# Patient Record
Sex: MALE | Race: WHITE | NOT HISPANIC OR LATINO
[De-identification: names, ages, dates, MRNs, and addresses within clinical notes are randomized per-mention and may not be internally consistent; named-entity substitution may affect disease eponyms.]

---

## 2019-06-14 ENCOUNTER — APPOINTMENT (OUTPATIENT)
Dept: UROLOGY | Facility: CLINIC | Age: 69
End: 2019-06-14
Payer: MEDICARE

## 2019-06-14 VITALS
HEART RATE: 68 BPM | RESPIRATION RATE: 16 BRPM | BODY MASS INDEX: 40.09 KG/M2 | DIASTOLIC BLOOD PRESSURE: 74 MMHG | HEIGHT: 70 IN | TEMPERATURE: 97.8 F | WEIGHT: 280 LBS | SYSTOLIC BLOOD PRESSURE: 163 MMHG | OXYGEN SATURATION: 99 %

## 2019-06-14 PROCEDURE — 99203 OFFICE O/P NEW LOW 30 MIN: CPT

## 2019-06-14 NOTE — LETTER BODY
[FreeTextEntry1] : Hudson Simms MD\par [address] [facility]\[place] [address]\par \par Dear Dr. Simms,\par \par Marin Cade returns to the office today. He is a 68-year-old man who returns to the office for reevaluation of PSA elevation. I have seen him in the past for this and performed a prostate biopsy in February 2011. The biopsy at that time that showed all benign prostate tissue. The last PSA I had on record until his recent tests that he brought with him today was 6.78 ng/mL with 19% free fraction. He had an MRI at the time of 2013 which showed diffuse heterogeneity of the peripheral zone most consistent with inflammation, PIRAD 3 designation. The prostate size was measured at 45 cubic centimeters at that time.\par \par The patient says that he has had his PSA followed fairly regularly since I last saw him in 2013. He thinks most recently it had been around April last year but this year his PSA was checked about 4 weeks ago and was found to be 11.5 ng/mL. This prompted his referral back for followup evaluation.\par \par The patient reports no major urinary complaints at this time including urgency, frequency, gross hematuria or dysuria. He denies fevers or chills and has no nausea or vomiting. His appetite is stable. He denies unintentional weight loss. He remains overweight.\par \par We discussed the nonspecific nature of the PSA blood test in the multiple reasons why it might become elevated. I would recommend we first repeat his PSA level today to confirm the consistent elevation. I would also suggest that he undergo followup MRI study of the prostate to confirm if there are any suspicious lesions present that would warrant a targeted biopsy approach here. We may well for her with the biopsy regardless of the MRI findings but the MRI findings will help guide the approach for biopsy.\par \par We did briefly discuss the transperineal approach to biopsy that is now being used for prostate cancer detection and I will discuss that further with him after the MRI findings are available for review. I will update you with the results once available for review.\par \par Sincerely,\par \par \par \par \par Román Singh MD, FACS\par  of Urology\par [facility]\par \par [facility]\par Director of Robotics and Minimally Invasive Surgery\par [address] [place] [address]\[place] [address]\par P: [phone]\par F: [phone]\par [email or web address]\par

## 2019-06-14 NOTE — PHYSICAL EXAM
[General Appearance - Well Developed] : well developed [Well Groomed] : well groomed [General Appearance - Well Nourished] : well nourished [Normal Appearance] : normal appearance [Edema] : no peripheral edema [General Appearance - In No Acute Distress] : no acute distress [Respiration, Rhythm And Depth] : normal respiratory rhythm and effort [Abdomen Tenderness] : non-tender [Abdomen Soft] : soft [Exaggerated Use Of Accessory Muscles For Inspiration] : no accessory muscle use [Urinary Bladder Findings] : the bladder was normal on palpation [Urethral Meatus] : meatus normal [Costovertebral Angle Tenderness] : no ~M costovertebral angle tenderness [Testes Mass (___cm)] : there were no testicular masses [Scrotum] : the scrotum was normal [No Focal Deficits] : no focal deficits [] : no rash [Normal Station and Gait] : the gait and station were normal for the patient's age [Affect] : the affect was normal [Oriented To Time, Place, And Person] : oriented to person, place, and time [Mood] : the mood was normal [No Palpable Adenopathy] : no palpable adenopathy [Not Anxious] : not anxious

## 2019-06-19 ENCOUNTER — OTHER (OUTPATIENT)
Age: 69
End: 2019-06-19

## 2019-06-19 LAB
PSA FREE FLD-MCNC: 17 %
PSA FREE SERPL-MCNC: 1.93 NG/ML
PSA SERPL-MCNC: 11.7 NG/ML

## 2019-07-02 ENCOUNTER — FORM ENCOUNTER (OUTPATIENT)
Age: 69
End: 2019-07-02

## 2019-07-03 ENCOUNTER — APPOINTMENT (OUTPATIENT)
Dept: MRI IMAGING | Facility: CLINIC | Age: 69
End: 2019-07-03
Payer: MEDICARE

## 2019-07-03 ENCOUNTER — OUTPATIENT (OUTPATIENT)
Dept: OUTPATIENT SERVICES | Facility: HOSPITAL | Age: 69
LOS: 1 days | End: 2019-07-03
Payer: COMMERCIAL

## 2019-07-03 DIAGNOSIS — Z98.89 OTHER SPECIFIED POSTPROCEDURAL STATES: Chronic | ICD-10-CM

## 2019-07-03 DIAGNOSIS — R97.20 ELEVATED PROSTATE SPECIFIC ANTIGEN [PSA]: ICD-10-CM

## 2019-07-03 PROCEDURE — A9585: CPT

## 2019-07-03 PROCEDURE — 72197 MRI PELVIS W/O & W/DYE: CPT | Mod: 26

## 2019-07-03 PROCEDURE — 72197 MRI PELVIS W/O & W/DYE: CPT

## 2019-09-20 ENCOUNTER — APPOINTMENT (OUTPATIENT)
Dept: UROLOGY | Facility: CLINIC | Age: 69
End: 2019-09-20
Payer: MEDICARE

## 2019-09-20 VITALS — SYSTOLIC BLOOD PRESSURE: 175 MMHG | HEART RATE: 75 BPM | DIASTOLIC BLOOD PRESSURE: 80 MMHG

## 2019-09-20 PROCEDURE — 99213 OFFICE O/P EST LOW 20 MIN: CPT

## 2019-10-04 ENCOUNTER — APPOINTMENT (OUTPATIENT)
Dept: UROLOGY | Facility: CLINIC | Age: 69
End: 2019-10-04
Payer: MEDICARE

## 2019-10-04 VITALS — TEMPERATURE: 97.7 F | HEART RATE: 62 BPM | DIASTOLIC BLOOD PRESSURE: 70 MMHG | SYSTOLIC BLOOD PRESSURE: 172 MMHG

## 2019-10-04 VITALS — DIASTOLIC BLOOD PRESSURE: 76 MMHG | HEART RATE: 61 BPM | SYSTOLIC BLOOD PRESSURE: 152 MMHG

## 2019-10-04 VITALS — HEART RATE: 58 BPM | DIASTOLIC BLOOD PRESSURE: 68 MMHG | SYSTOLIC BLOOD PRESSURE: 98 MMHG

## 2019-10-04 PROCEDURE — 76942 ECHO GUIDE FOR BIOPSY: CPT | Mod: 26,59

## 2019-10-04 PROCEDURE — 76872 US TRANSRECTAL: CPT | Mod: 26

## 2019-10-04 PROCEDURE — 55700: CPT | Mod: 22

## 2019-10-14 LAB — CORE LAB BIOPSY: NORMAL

## 2019-10-14 RX ORDER — DIAZEPAM 5 MG/1
5 TABLET ORAL
Qty: 1 | Refills: 0 | Status: DISCONTINUED | COMMUNITY
Start: 2019-09-20 | End: 2019-10-14

## 2019-10-21 ENCOUNTER — RESULT REVIEW (OUTPATIENT)
Age: 69
End: 2019-10-21

## 2020-03-20 ENCOUNTER — APPOINTMENT (OUTPATIENT)
Dept: UROLOGY | Facility: CLINIC | Age: 70
End: 2020-03-20

## 2020-05-05 ENCOUNTER — APPOINTMENT (OUTPATIENT)
Dept: UROLOGY | Facility: CLINIC | Age: 70
End: 2020-05-05
Payer: MEDICARE

## 2020-05-05 VITALS
HEART RATE: 75 BPM | DIASTOLIC BLOOD PRESSURE: 84 MMHG | RESPIRATION RATE: 16 BRPM | TEMPERATURE: 98.1 F | SYSTOLIC BLOOD PRESSURE: 168 MMHG

## 2020-05-05 DIAGNOSIS — Z87.898 PERSONAL HISTORY OF OTHER SPECIFIED CONDITIONS: ICD-10-CM

## 2020-05-05 PROCEDURE — 99213 OFFICE O/P EST LOW 20 MIN: CPT

## 2020-05-09 ENCOUNTER — TRANSCRIPTION ENCOUNTER (OUTPATIENT)
Age: 70
End: 2020-05-09

## 2020-11-20 ENCOUNTER — TRANSCRIPTION ENCOUNTER (OUTPATIENT)
Age: 70
End: 2020-11-20

## 2020-11-20 ENCOUNTER — INPATIENT (INPATIENT)
Facility: HOSPITAL | Age: 70
LOS: 0 days | Discharge: HOME | DRG: 247 | End: 2020-11-21
Attending: INTERNAL MEDICINE | Admitting: INTERNAL MEDICINE
Payer: COMMERCIAL

## 2020-11-20 VITALS
SYSTOLIC BLOOD PRESSURE: 173 MMHG | HEART RATE: 72 BPM | HEIGHT: 70 IN | RESPIRATION RATE: 20 BRPM | TEMPERATURE: 98 F | DIASTOLIC BLOOD PRESSURE: 92 MMHG | WEIGHT: 259.93 LBS | OXYGEN SATURATION: 98 %

## 2020-11-20 DIAGNOSIS — Z98.89 OTHER SPECIFIED POSTPROCEDURAL STATES: Chronic | ICD-10-CM

## 2020-11-20 DIAGNOSIS — R00.2 PALPITATIONS: ICD-10-CM

## 2020-11-20 DIAGNOSIS — Z98.890 OTHER SPECIFIED POSTPROCEDURAL STATES: Chronic | ICD-10-CM

## 2020-11-20 DIAGNOSIS — I20.0 UNSTABLE ANGINA: ICD-10-CM

## 2020-11-20 LAB
ALBUMIN SERPL ELPH-MCNC: 4.4 G/DL — SIGNIFICANT CHANGE UP (ref 3.3–5.2)
ALP SERPL-CCNC: 77 U/L — SIGNIFICANT CHANGE UP (ref 40–120)
ALT FLD-CCNC: 20 U/L — SIGNIFICANT CHANGE UP
ANION GAP SERPL CALC-SCNC: 10 MMOL/L — SIGNIFICANT CHANGE UP (ref 5–17)
APTT BLD: 28.7 SEC — SIGNIFICANT CHANGE UP (ref 27.5–35.5)
AST SERPL-CCNC: 29 U/L — SIGNIFICANT CHANGE UP
BASOPHILS # BLD AUTO: 0.07 K/UL — SIGNIFICANT CHANGE UP (ref 0–0.2)
BASOPHILS NFR BLD AUTO: 1 % — SIGNIFICANT CHANGE UP (ref 0–2)
BILIRUB SERPL-MCNC: 0.7 MG/DL — SIGNIFICANT CHANGE UP (ref 0.4–2)
BUN SERPL-MCNC: 23 MG/DL — HIGH (ref 8–20)
CALCIUM SERPL-MCNC: 9.5 MG/DL — SIGNIFICANT CHANGE UP (ref 8.6–10.2)
CHLORIDE SERPL-SCNC: 102 MMOL/L — SIGNIFICANT CHANGE UP (ref 98–107)
CO2 SERPL-SCNC: 29 MMOL/L — SIGNIFICANT CHANGE UP (ref 22–29)
CREAT SERPL-MCNC: 1.09 MG/DL — SIGNIFICANT CHANGE UP (ref 0.5–1.3)
D DIMER BLD IA.RAPID-MCNC: 240 NG/ML DDU — HIGH
EOSINOPHIL # BLD AUTO: 0.28 K/UL — SIGNIFICANT CHANGE UP (ref 0–0.5)
EOSINOPHIL NFR BLD AUTO: 4.1 % — SIGNIFICANT CHANGE UP (ref 0–6)
GLUCOSE SERPL-MCNC: 100 MG/DL — HIGH (ref 70–99)
HCT VFR BLD CALC: 46 % — SIGNIFICANT CHANGE UP (ref 39–50)
HGB BLD-MCNC: 15.2 G/DL — SIGNIFICANT CHANGE UP (ref 13–17)
IMM GRANULOCYTES NFR BLD AUTO: 0.4 % — SIGNIFICANT CHANGE UP (ref 0–1.5)
INR BLD: 0.99 RATIO — SIGNIFICANT CHANGE UP (ref 0.88–1.16)
LYMPHOCYTES # BLD AUTO: 1.96 K/UL — SIGNIFICANT CHANGE UP (ref 1–3.3)
LYMPHOCYTES # BLD AUTO: 28.6 % — SIGNIFICANT CHANGE UP (ref 13–44)
MAGNESIUM SERPL-MCNC: 2.2 MG/DL — SIGNIFICANT CHANGE UP (ref 1.6–2.6)
MCHC RBC-ENTMCNC: 31.4 PG — SIGNIFICANT CHANGE UP (ref 27–34)
MCHC RBC-ENTMCNC: 33 GM/DL — SIGNIFICANT CHANGE UP (ref 32–36)
MCV RBC AUTO: 95 FL — SIGNIFICANT CHANGE UP (ref 80–100)
MONOCYTES # BLD AUTO: 0.69 K/UL — SIGNIFICANT CHANGE UP (ref 0–0.9)
MONOCYTES NFR BLD AUTO: 10.1 % — SIGNIFICANT CHANGE UP (ref 2–14)
NEUTROPHILS # BLD AUTO: 3.83 K/UL — SIGNIFICANT CHANGE UP (ref 1.8–7.4)
NEUTROPHILS NFR BLD AUTO: 55.8 % — SIGNIFICANT CHANGE UP (ref 43–77)
NT-PROBNP SERPL-SCNC: 773 PG/ML — HIGH (ref 0–300)
PLATELET # BLD AUTO: 215 K/UL — SIGNIFICANT CHANGE UP (ref 150–400)
POTASSIUM SERPL-MCNC: 4.5 MMOL/L — SIGNIFICANT CHANGE UP (ref 3.5–5.3)
POTASSIUM SERPL-SCNC: 4.5 MMOL/L — SIGNIFICANT CHANGE UP (ref 3.5–5.3)
PROT SERPL-MCNC: 7.7 G/DL — SIGNIFICANT CHANGE UP (ref 6.6–8.7)
PROTHROM AB SERPL-ACNC: 11.5 SEC — SIGNIFICANT CHANGE UP (ref 10.6–13.6)
RBC # BLD: 4.84 M/UL — SIGNIFICANT CHANGE UP (ref 4.2–5.8)
RBC # FLD: 13.4 % — SIGNIFICANT CHANGE UP (ref 10.3–14.5)
SARS-COV-2 RNA SPEC QL NAA+PROBE: SIGNIFICANT CHANGE UP
SODIUM SERPL-SCNC: 141 MMOL/L — SIGNIFICANT CHANGE UP (ref 135–145)
TROPONIN T SERPL-MCNC: 0.03 NG/ML — SIGNIFICANT CHANGE UP (ref 0–0.06)
TSH SERPL-MCNC: 1.52 UIU/ML — SIGNIFICANT CHANGE UP (ref 0.27–4.2)
WBC # BLD: 6.86 K/UL — SIGNIFICANT CHANGE UP (ref 3.8–10.5)
WBC # FLD AUTO: 6.86 K/UL — SIGNIFICANT CHANGE UP (ref 3.8–10.5)

## 2020-11-20 PROCEDURE — 71275 CT ANGIOGRAPHY CHEST: CPT | Mod: 26

## 2020-11-20 PROCEDURE — 93458 L HRT ARTERY/VENTRICLE ANGIO: CPT | Mod: 26,XU

## 2020-11-20 PROCEDURE — 71045 X-RAY EXAM CHEST 1 VIEW: CPT | Mod: 26

## 2020-11-20 PROCEDURE — 99285 EMERGENCY DEPT VISIT HI MDM: CPT

## 2020-11-20 PROCEDURE — 99152 MOD SED SAME PHYS/QHP 5/>YRS: CPT

## 2020-11-20 PROCEDURE — 93010 ELECTROCARDIOGRAM REPORT: CPT | Mod: 76

## 2020-11-20 PROCEDURE — 92928 PRQ TCAT PLMT NTRAC ST 1 LES: CPT | Mod: RC

## 2020-11-20 RX ORDER — ALPRAZOLAM 0.25 MG
0.25 TABLET ORAL AT BEDTIME
Refills: 0 | Status: DISCONTINUED | OUTPATIENT
Start: 2020-11-20 | End: 2020-11-21

## 2020-11-20 RX ORDER — AMLODIPINE BESYLATE 2.5 MG/1
5 TABLET ORAL DAILY
Refills: 0 | Status: DISCONTINUED | OUTPATIENT
Start: 2020-11-21 | End: 2020-11-21

## 2020-11-20 RX ORDER — SODIUM CHLORIDE 9 MG/ML
500 INJECTION INTRAMUSCULAR; INTRAVENOUS; SUBCUTANEOUS ONCE
Refills: 0 | Status: COMPLETED | OUTPATIENT
Start: 2020-11-20 | End: 2020-11-20

## 2020-11-20 RX ORDER — ASPIRIN/CALCIUM CARB/MAGNESIUM 324 MG
81 TABLET ORAL DAILY
Refills: 0 | Status: DISCONTINUED | OUTPATIENT
Start: 2020-11-21 | End: 2020-11-21

## 2020-11-20 RX ORDER — ACETAMINOPHEN 500 MG
650 TABLET ORAL EVERY 6 HOURS
Refills: 0 | Status: DISCONTINUED | OUTPATIENT
Start: 2020-11-20 | End: 2020-11-21

## 2020-11-20 RX ORDER — ONDANSETRON 8 MG/1
4 TABLET, FILM COATED ORAL EVERY 6 HOURS
Refills: 0 | Status: DISCONTINUED | OUTPATIENT
Start: 2020-11-20 | End: 2020-11-21

## 2020-11-20 RX ORDER — METOPROLOL TARTRATE 50 MG
50 TABLET ORAL ONCE
Refills: 0 | Status: COMPLETED | OUTPATIENT
Start: 2020-11-20 | End: 2020-11-20

## 2020-11-20 RX ORDER — ATORVASTATIN CALCIUM 80 MG/1
80 TABLET, FILM COATED ORAL AT BEDTIME
Refills: 0 | Status: DISCONTINUED | OUTPATIENT
Start: 2020-11-20 | End: 2020-11-21

## 2020-11-20 RX ORDER — ASPIRIN/CALCIUM CARB/MAGNESIUM 324 MG
324 TABLET ORAL ONCE
Refills: 0 | Status: COMPLETED | OUTPATIENT
Start: 2020-11-20 | End: 2020-11-20

## 2020-11-20 RX ORDER — METOPROLOL TARTRATE 50 MG
25 TABLET ORAL DAILY
Refills: 0 | Status: DISCONTINUED | OUTPATIENT
Start: 2020-11-21 | End: 2020-11-21

## 2020-11-20 RX ORDER — CLOPIDOGREL BISULFATE 75 MG/1
600 TABLET, FILM COATED ORAL ONCE
Refills: 0 | Status: COMPLETED | OUTPATIENT
Start: 2020-11-21 | End: 2020-11-21

## 2020-11-20 RX ORDER — OXYCODONE HYDROCHLORIDE 5 MG/1
5 TABLET ORAL EVERY 6 HOURS
Refills: 0 | Status: DISCONTINUED | OUTPATIENT
Start: 2020-11-20 | End: 2020-11-21

## 2020-11-20 RX ADMIN — Medication 50 MILLIGRAM(S): at 14:12

## 2020-11-20 RX ADMIN — Medication 324 MILLIGRAM(S): at 14:12

## 2020-11-20 RX ADMIN — Medication 650 MILLIGRAM(S): at 21:49

## 2020-11-20 RX ADMIN — ATORVASTATIN CALCIUM 80 MILLIGRAM(S): 80 TABLET, FILM COATED ORAL at 21:49

## 2020-11-20 RX ADMIN — SODIUM CHLORIDE 500 MILLILITER(S): 9 INJECTION INTRAMUSCULAR; INTRAVENOUS; SUBCUTANEOUS at 21:48

## 2020-11-20 RX ADMIN — Medication 650 MILLIGRAM(S): at 22:30

## 2020-11-20 NOTE — H&P PST ADULT - HISTORY OF PRESENT ILLNESS
Narrative: 71 y/o obese M, former smoker (.5PPD x10 years; quit 40 years ago), with a PMHx of HTN, HLD, BPH,  family hx of heart disease (father: MI x2 at age 70; PPM) presented to the ED with c/o palpitations.  He reports he recently followed up with Dr. Salas and had a Holter Monitor placed (Tues 11/17) for his new onset palpitations. Dr. Souza called pt this am and advised him to come in through the ED.  His troponins have been negative, CT chest -, D-Dimer negative.  He reports he is active and bike rides as well playing golf; he has noticed his palpitations have increased in frequency and have woken him out of his sleep; they resolve spontaneously.  He formerly used to follow with Dr. Bauer ([facility]) and admits to having a "normal stress and echo back then" which was about 5 years ago. He has never had a cardiac catheterization.   In the ED, he was given aspirin 324mg and metoprolol 50mg.  Currently he is free from chest pain, SOB, palps, dyspnea, diaphoresis, jaw/back/shoulder pain; denies pre syncope, syncope, vision changes.  There is +2 edema noted bilaterally to his lower extremities.     Home meds: asa, amlodipine, zocor    ASA 2  MALL 2  BRA 1.2%

## 2020-11-20 NOTE — CONSULT NOTE ADULT - ATTENDING COMMENTS
I have personally seen, examined, and participated in the care of this patient. I have reviewed all pertinent clinical information, including history, physical exam, plan, and the PA/NP's note and agree except as noted below.    70 year old male with HTN & HL who presented with palpitations and exertional chest pain. He had a cardiac work up at [facility] ~5 years ago which he states was normal (TTE, Carotids, Nuclear Stress Test). He wore a Holter with Dr. Salas which showed NSVT and pAT. He had ECG changes so was sent to ER for evaluation.    Recommend obtaining TSH, TTE, and ischemic evaluation. Also, recommend starting Metoprolol 25mg BID and uptitrate as tolerated.    Outpatient follow up for NSVT/pAT.    Thank you for this consult.    Ahsan Mccann MD  Clinical Cardiac Electrophysiology

## 2020-11-20 NOTE — H&P PST ADULT - NSICDXPASTMEDICALHX_GEN_ALL_CORE_FT
PAST MEDICAL HISTORY:  BPH (benign prostatic hyperplasia)     Former cigarette smoker     HTN (hypertension)     Hypertension     NSVT (nonsustained ventricular tachycardia)     Obesity     Wrist pain, right

## 2020-11-20 NOTE — DISCHARGE NOTE PROVIDER - NSDCCPCAREPLAN_GEN_ALL_CORE_FT
PRINCIPAL DISCHARGE DIAGNOSIS  Diagnosis: Unstable angina  Assessment and Plan of Treatment: -you had one stent placed during your cardiac catheterization with Dr. Reno  -right coronary artery stent; resolute ana 3.0x18mm  -dual anti platelet therapy with aspirin and plavix for stent protection; do not discontinue these medications without discussing with your Cardiologist first  -continue your amlodipine and simvastatin  -metoprolol succinate 25mg daily for your heart rate and blood pressure  -diet and exercise modifications  -follow up with Dr. Salas at [facility]

## 2020-11-20 NOTE — H&P PST ADULT - NSICDXPASTSURGICALHX_GEN_ALL_CORE_FT
PAST SURGICAL HISTORY:  H/O prostate biopsy     S/P colonoscopy     S/P hernia repair 1980    S/P knee surgery righty scope in 1990 and left meniscus tear in 1977

## 2020-11-20 NOTE — H&P PST ADULT - NEGATIVE NEUROLOGICAL SYMPTOMS
no transient paralysis/no weakness/no loss of sensation/no difficulty walking/no syncope/no tremors/no paresthesias/no headache/no generalized seizures

## 2020-11-20 NOTE — H&P PST ADULT - NEUROLOGICAL DETAILS
alert and oriented x 3/deep reflexes intact/responds to pain/cranial nerves intact/responds to verbal commands/sensation intact

## 2020-11-20 NOTE — CONSULT NOTE ADULT - SUBJECTIVE AND OBJECTIVE BOX
[facility]                                                               Office:  [address] [address]                                                              Telephone: [phone]. Fax:[phone]                                                                        CARDIOLOGY CONSULTATION NOTE                                                                                             Consult requested by:    Reason for Consultation:   History obtained by: Patient and medical record   obtained: No    Chief complaint:    Patient is a 70y old  Male who presents with a chief complaint of chest pain, palpitations, SOB       HPI: Pt 71 y/o with medical history of HTN, Right wrist tendonitis,           REVIEW OF SYMPTOMS:     CONSTITUTIONAL: No fever, weight loss, or fatigue  ENMT:  No difficulty hearing, tinnitus, vertigo; No sinus or throat pain  NECK: No pain or stiffness  CARDIOVASCULAR: See HPI  RESPIRATORY: No Dyspnea on exertion, Shortness of breath, cough, wheezing  : No dysuria, no hematuria   GI: No dark color stool, no melena, no diarrhea, no constipation, no abdominal pain   NEURO: No headache, no dizziness, no slurred speech   MUSCULOSKELETAL: No joint pain or swelling; No muscle, back, or extremity pain  PSYCH: No agitation, no anxiety.    ALL OTHER REVIEW OF SYSTEMS ARE NEGATIVE.      PREVIOUS DIAGNOSTIC TESTING  ECHO FINDINGS: approx 5 years ago, normal as per pt      STRESS FINDINGS: approx 5 years ago, normal as per pt        ALLERGIES: Allergies    No Known Allergies    Intolerances      PAST MEDICAL HISTORY  Wrist pain, right    Hypertension        PAST SURGICAL HISTORY  S/P colonoscopy    S/P knee surgery    S/P hernia repair        FAMILY HISTORY:  Family history of cancer (Mother)  uterus    Family history of heart disease (Father)    Family history of COPD (chronic obstructive pulmonary disease) (Father)        SOCIAL HISTORY:    CIGARETTES:  1/2 ppd, quit 40 years ago  ALCOHOL: Occasional  DRUGS: Denies      CURRENT MEDICATIONS:  metoprolol tartrate 50 milliGRAM(s) Oral Once     aspirin  chewable        HOME MEDICATIONS:    aspirin 81 mg oral tablet: 1 tab(s) orally once a day (22 May 2015 10:14)  Norvasc 5 mg oral tablet: 1 tab(s) orally once a day (22 May 2015 10:14)      Vital Signs Last 24 Hrs  T(C): 36.4 (20 Nov 2020 12:10), Max: 36.4 (20 Nov 2020 12:10)  T(F): 97.6 (20 Nov 2020 12:10), Max: 97.6 (20 Nov 2020 12:10)  HR: 72 (20 Nov 2020 12:10) (72 - 72)  BP: 173/92 (20 Nov 2020 12:10) (173/92 - 173/92)  RR: 20 (20 Nov 2020 12:10) (20 - 20)  SpO2: 98% (20 Nov 2020 12:10) (98% - 98%)      PHYSICAL EXAM:  Constitutional: Comfortable . No acute distress.   HEENT: Atraumatic and normocephalic , neck is supple . no JVD. No carotid bruit. PEERL   CNS: A&Ox3. No focal deficits. EOMI   Lymph Nodes: Cervical : Not palpable  Respiratory: CTAB    Cardiovascular: S1S2 RRR. No murmur/rubs or gallop  Gastrointestinal: Soft non-tender and non distended . +Bowel sounds, negative Vasquez's sign.  Extremities: Bilateral lower extremity pitting +3/+3 edema   Psychiatric: Calm . no agitation  Skin: No skin rash/ulcers visualized to face, hands or feet    Intake and output:     LABS:                        15.2   6.86  )-----------( 215      ( 20 Nov 2020 13:10 )             46.0             ;p-BNP=  PT/INR - ( 20 Nov 2020 13:10 )   PT: 11.5 sec;   INR: 0.99 ratio         PTT - ( 20 Nov 2020 13:10 )  PTT:28.7 sec      INTERPRETATION OF TELEMETRY:   ECG: NSR HR @ 69, inferolateral leads ST-T-wave abnormalties     RADIOLOGY & ADDITIONAL STUDIES:    X-ray:          [facility]                                                               Office:  [address] [address]                                                              Telephone: [phone]. Fax:[phone]                                                                        CARDIOLOGY CONSULTATION NOTE                                                                                             Consult requested by:  Dr. Mendoza  Reason for Consultation: Chest Pain, palpitations, SOB  History obtained by: Patient and medical record   obtained: No    Chief complaint:    Patient is a 70y old  Male who presents with a chief complaint of chest pain, palpitations, SOB       HPI: Pt 69 y/o with medical history of HTN, Right wrist tendonitis,  who presents to [facility]ED as referred by Maritza Michael in outpt cardiology office for palpitations, chest pain, SOB. Pt states that for past two weeks he has been experiencing palpitations intermittently lasting for "a few minutes". Pt denies symptoms of dizziness or episodes of syncope. Pt also c/o of SOB/NO during/after exercise (avg. exercise biking 3 days/ week, walking). Pt also admits to chest pain described as dull aching, left sided, non-radiating. Pt was referred to Dr. Salas from PCP d/t c/o of palpitations and NSVT/SVT found on holter monitoring. During cardiology appointment today pt was assessed to have above stated symptoms with worsening bilateral lower extremity edema. Pt was advised to seek emergent medical attention. In ED, ECG- NSR HR @ 69, inferolateral leads ST-T-wave abnormalties, Trop#1-negative, Xray-pending.           REVIEW OF SYMPTOMS:     CONSTITUTIONAL: No fever, weight loss, or fatigue  ENMT:  No difficulty hearing, tinnitus, vertigo; No sinus or throat pain  NECK: No pain or stiffness  CARDIOVASCULAR: See HPI  RESPIRATORY: No Dyspnea on exertion, Shortness of breath, cough, wheezing  : No dysuria, no hematuria   GI: No dark color stool, no melena, no diarrhea, no constipation, no abdominal pain   NEURO: No headache, no dizziness, no slurred speech   MUSCULOSKELETAL: No joint pain or swelling; No muscle, back, or extremity pain  PSYCH: No agitation, no anxiety.    ALL OTHER REVIEW OF SYSTEMS ARE NEGATIVE.      PREVIOUS DIAGNOSTIC TESTING  ECHO FINDINGS: approx 5 years ago, normal as per pt      STRESS FINDINGS: approx 5 years ago, normal as per pt        ALLERGIES: Allergies    No Known Allergies    Intolerances      PAST MEDICAL HISTORY  Wrist pain, right    Hypertension        PAST SURGICAL HISTORY  S/P colonoscopy    S/P knee surgery    S/P hernia repair        FAMILY HISTORY:  Family history of cancer (Mother)  uterus    Family history of heart disease (Father)    Family history of COPD (chronic obstructive pulmonary disease) (Father)        SOCIAL HISTORY:    CIGARETTES:  1/2 ppd, quit 40 years ago  ALCOHOL: Occasional  DRUGS: Denies      CURRENT MEDICATIONS:  metoprolol tartrate 50 milliGRAM(s) Oral Once     aspirin  chewable        HOME MEDICATIONS:    aspirin 81 mg oral tablet: 1 tab(s) orally once a day (22 May 2015 10:14)  Norvasc 5 mg oral tablet: 1 tab(s) orally once a day (22 May 2015 10:14)      Vital Signs Last 24 Hrs  T(C): 36.4 (20 Nov 2020 12:10), Max: 36.4 (20 Nov 2020 12:10)  T(F): 97.6 (20 Nov 2020 12:10), Max: 97.6 (20 Nov 2020 12:10)  HR: 72 (20 Nov 2020 12:10) (72 - 72)  BP: 173/92 (20 Nov 2020 12:10) (173/92 - 173/92)  RR: 20 (20 Nov 2020 12:10) (20 - 20)  SpO2: 98% (20 Nov 2020 12:10) (98% - 98%)      PHYSICAL EXAM:  Constitutional: Comfortable . No acute distress.   HEENT: Atraumatic and normocephalic , neck is supple . no JVD. No carotid bruit. PEERL   CNS: A&Ox3. No focal deficits. EOMI   Lymph Nodes: Cervical : Not palpable  Respiratory: CTAB    Cardiovascular: S1S2 RRR. No murmur/rubs or gallop  Gastrointestinal: Soft non-tender and non distended . +Bowel sounds, negative Vasquez's sign.  Extremities: Bilateral lower extremity pitting +3/+3 edema   Psychiatric: Calm . no agitation  Skin: No skin rash/ulcers visualized to face, hands or feet    Intake and output:     LABS:                        15.2   6.86  )-----------( 215      ( 20 Nov 2020 13:10 )             46.0             ;p-BNP=  PT/INR - ( 20 Nov 2020 13:10 )   PT: 11.5 sec;   INR: 0.99 ratio         PTT - ( 20 Nov 2020 13:10 )  PTT:28.7 sec      INTERPRETATION OF TELEMETRY: NSR HR @ 60s     ECG: NSR HR @ 69, inferolateral leads ST-T-wave abnormalties     RADIOLOGY & ADDITIONAL STUDIES:    X-ray:  pending

## 2020-11-20 NOTE — DISCHARGE NOTE PROVIDER - NSDCMRMEDTOKEN_GEN_ALL_CORE_FT
aspirin 81 mg oral tablet: 1 tab(s) orally once a day  Norvasc 5 mg oral tablet: 1 tab(s) orally once a day  Zocor 20 mg oral tablet: 1 tab(s) orally once a day (at bedtime)   aspirin 81 mg oral tablet: 1 tab(s) orally once a day  clopidogrel 75 mg oral tablet: 1 tab(s) orally once a day  metoprolol succinate 25 mg oral tablet, extended release: 1 tab(s) orally once a day  Norvasc 5 mg oral tablet: 1 tab(s) orally once a day  zocor: 80 milligram(s) orally once a day (at bedtime)

## 2020-11-20 NOTE — CONSULT NOTE ADULT - ASSESSMENT
Pt 69 y/o with medical history of HTN, Right wrist tendonitis,  who presents to [facility]ED as referred by Maritza Michael in outpt cardiology office for palpitations, chest pain, SOB. Pt states that for past two weeks he has been experiencing palpitations intermittently lasting for "a few minutes". Pt denies symptoms of dizziness or episodes of syncope. Pt also c/o of SOB/NO during/after exercise (avg. exercise biking 3 days/ week, walking). Pt also admits to chest pain described as dull aching, left sided, non-radiating. Pt was referred to Dr. Salas from PCP d/t c/o of palpitations and NSVT/SVT found on holter monitoring. During cardiology appointment today pt was assessed to have above stated symptoms with worsening bilateral lower extremity edema. Pt was advised to seek emergent medical attention. In ED, ECG- NSR HR @ 69, inferolateral leads ST-T-wave abnormalties, Trop#1-negative, Xray-pending.         Chest Pain  -Trop#1-negative  - ECG- NSR HR @ 69, inferolateral leads ST-T-wave abnormalties  -Xray-pending  -Cont. to trend trop   -Give Lopressor 50MG prior to CCTA  -CCTA- ordered and pending   -Echo-ordered and pending    Ventricular Ectopy  -TSH- ordered and pending  -Cont tele monitoring  -EP eval requested     Dyspnea on Exertion  -Echo-ordered and pending  -D-dimer- 240  -Pt presents with bilateral lower extremity edema-chronic  -Awaiting echo to assess fluid status and treat accordingly       HTN  -Recent BP- 173/92  -Repeat and trend BP  -Consider adding BB therapy if coincides with EP recs, if not start thiazide diuretics

## 2020-11-20 NOTE — ED PROVIDER NOTE - NS ED ROS FT
Constitutional: (-) fever  (-)chills  (-)sweats  Eyes/ENT: (-)   Cardiovascular: (+) chest pain, (+) palpitations (-) edema   Respiratory: (-) cough, (+) shortness of breath   Gastrointestinal: (-)nausea  (-)vomiting, (-) diarrhea  (-) abdominal pain   :  (-)dysuria, (-)frequency, (-)urgency, (-)hematuria  Musculoskeletal: (-) neck pain, (-) back pain, (-) joint pain  Integumentary: (-) rash, (-) edema  Neurological: (-) headache, (-) altered mental status  (-)LOC

## 2020-11-20 NOTE — H&P PST ADULT - REASON FOR ADMISSION
Palpitations/ chest pain Palpitations/ chest pain  ACS <24 hours, unstable angina  urgent left heart cardiac catheterization

## 2020-11-20 NOTE — H&P PST ADULT - ASSESSMENT
71 y/o obese  M, former smoker, with HTN HLD new onset palpitations, now with ACS; here for left heart cardiac catheterization with Dr. Reno.

## 2020-11-20 NOTE — ED PROVIDER NOTE - CLINICAL SUMMARY MEDICAL DECISION MAKING FREE TEXT BOX
patient arrived with worsening exertional chest pain and rose, pleuritic. cta with no acute pe. trop negative. elevated bnp. cardiology requesting admission for cath.

## 2020-11-20 NOTE — DISCHARGE NOTE PROVIDER - NSDCACTIVITY_GEN_ALL_CORE
Choose lean meats and poultry without skin and prepare them without added saturated and trans fat.  Eat fish at least twice a week. Recent research shows that eating oily fish containing omega-3 fatty acids (for example, salmon, trout and herring) may help lower your risk of death from coronary artery disease.  Select fat-free, 1 percent fat and low-fat dairy products.  Cut back on foods containing partially hydrogenated vegetable oils to reduce trans fat in your diet.   To lower cholesterol, reduce saturated fat to no more than 5 to 6 percent of total calories. For someone eating 2,000 calories a day, that’s about 13 grams of saturated fat.  Cut back on beverages and foods with added sugars.  Choose and prepare foods with little or no salt. To lower blood pressure, aim to eat no more than 2,400 milligrams of sodium per day. Reducing daily intake to 1,500 mg is desirable because it can lower blood pressure even further.  If you drink alcohol, drink in moderation. That means one drink per day if you’re a woman and two drinks  per day if you’re a man.  Follow the American Heart Association recommendations when you eat out, and keep an eye on your portion sizes. Showering allowed/Do not make important decisions/Walking - Indoors allowed/No heavy lifting/straining/Choose lean meats and poultry without skin and prepare them without added saturated and trans fat.  Eat fish at least twice a week. Recent research shows that eating oily fish containing omega-3 fatty acids (for example, salmon, trout and herring) may help lower your risk of death from coronary artery disease.  Select fat-free, 1 percent fat and low-fat dairy products.  Cut back on foods containing partially hydrogenated vegetable oils to reduce trans fat in your diet.   To lower cholesterol, reduce saturated fat to no more than 5 to 6 percent of total calories. For someone eating 2,000 calories a day, that’s about 13 grams of saturated fat.  Cut back on beverages and foods with added sugars.  Choose and prepare foods with little or no salt. To lower blood pressure, aim to eat no more than 2,400 milligrams of sodium per day. Reducing daily intake to 1,500 mg is desirable because it can lower blood pressure even further.  If you drink alcohol, drink in moderation. That means one drink per day if you’re a woman and two drinks  per day if you’re a man.  Follow the American Heart Association recommendations when you eat out, and keep an eye on your portion sizes./Do not drive or operate machinery

## 2020-11-20 NOTE — ED PROVIDER NOTE - OBJECTIVE STATEMENT
70yoM; with pmh signif for HTN, ex-smoker 30 years ago; now p/w chest pain--sscp, pressure, associated with palpitations, and sob. patient with increasing rose over past week. denies f/c/s. denies cough. denies sick contacts. denies travel or recent surgery.  denies abd or back pain. denies contact with covid patients. reports he was wearing holter overnight and was found to have SVT, sent in for further eval.   PMH: HTN  SOCIAL: ex-smoker

## 2020-11-20 NOTE — DISCHARGE NOTE PROVIDER - CARE PROVIDER_API CALL
Butch Salas  CARDIOVASCULAR DISEASE  [address] [facility], [place] [address]  Phone: [phone]  Fax: [phone]  Follow Up Time:    Butch Salas  CARDIOVASCULAR DISEASE  [address] [facility], [place] [address]  Phone: [phone]  Fax: [phone]  Follow Up Time: 1 week

## 2020-11-20 NOTE — PROGRESS NOTE ADULT - SUBJECTIVE AND OBJECTIVE BOX
Department of Cardiology                                                                  [facility]/[place] [address] [address] [facility]                                                            Telephone: [phone]. Fax:[phone]                                                    Post- Procedure Note: Left Heart Cardiac Catheterization       Narrative:  70y  Male is now s/p left heart catheterization via right radial approach (no site complications; radial band in place) with Dr. Reno:  Right radial precautions  mRCA 99% stenosis  Resolute ana DANO placed 3.0 x18mm  normal LV gram  Antiplatelet used: brilinta loaded 180mg  Heparin used: 10,000 units  IVF used: normal saline 150ml  Contrast used: omnipaque 94ml  ACT over 400 at 1827         PAST MEDICAL & SURGICAL HISTORY:  BPH (benign prostatic hyperplasia)    Former cigarette smoker    NSVT (nonsustained ventricular tachycardia)    HTN (hypertension)    Obesity    Wrist pain, right    Hypertension    H/O prostate biopsy    S/P colonoscopy    S/P knee surgery  righty scope in 1990 and left meniscus tear in 1977    S/P hernia repair  1980      FAMILY HISTORY:  Family history of cancer (Mother)  uterus    Family history of heart disease (Father)    Family history of COPD (chronic obstructive pulmonary disease) (Father)      Home Medications:  aspirin 81 mg oral tablet: 1 tab(s) orally once a day (22 May 2015 10:14)  Norvasc 5 mg oral tablet: 1 tab(s) orally once a day (22 May 2015 10:14)    Patient is a 70y old  Male who presents with a chief complaint of Palpitations/ chest pain  ACS <24 hours, unstable angina  urgent left heart cardiac catheterization (20 Nov 2020 17:55)    HEALTH ISSUES - PROBLEM Dx:  Palpitations    HPI:  Narrative: 69 y/o obese M, former smoker (.5PPD x10 years; quit 40 years ago), with a PMHx of HTN, HLD, BPH,  family hx of heart disease (father: MI x2 at age 70; PPM) presented to the ED with c/o palpitations.  He reports he recently followed up with Dr. Salas and had a Holter Monitor placed (Tues 11/17) for his new onset palpitations. Dr. Souza called pt this am and advised him to come in through the ED.  His troponins have been negative, CT chest -, D-Dimer negative.  He reports he is active and bike rides as well playing golf; he has noticed his palpitations have increased in frequency and have woken him out of his sleep; they resolve spontaneously.  He formerly used to follow with Dr. Bauer ([facility]) and admits to having a "normal stress and echo back then" which was about 5 years ago. He has never had a cardiac catheterization.   In the ED, he was given aspirin 324mg and metoprolol 50mg.  Currently he is free from chest pain, SOB, palps, dyspnea, diaphoresis, jaw/back/shoulder pain; denies pre syncope, syncope, vision changes.  There is +2 edema noted bilaterally to his lower extremities.     Home meds: asa, amlodipine, zocor    ASA 2  MALL 2  BRA 1.2% (20 Nov 2020 17:55)    General: No fatigue, no fevers/chills  Respiratory: No dyspnea, no cough, no wheeze  CV: No chest pain, no palpitations  Abd: No nausea  Neuro: No headache, no dizziness    Objective:  Vital Signs Last 24 Hrs  T(C): 36.4 (20 Nov 2020 12:10), Max: 36.4 (20 Nov 2020 12:10)  T(F): 97.6 (20 Nov 2020 12:10), Max: 97.6 (20 Nov 2020 12:10)  HR: 58 (20 Nov 2020 17:40) (51 - 72)  BP: 166/81 (20 Nov 2020 17:40) (156/74 - 173/92)  BP(mean): --  RR: 16 (20 Nov 2020 17:40) (16 - 20)  SpO2: 97% (20 Nov 2020 17:40) (97% - 100%)    CM: SR  Neuro: A&OX3, CN 2-12 intact  HEENT: NC, AT  Lungs: CTA B/L  CV: S1, S2, no murmur, RRR  Abd: Soft  Right Radial cath site: Soft, no bleeding, no hematoma  Extremity: + distal pulses                          15.2   6.86  )-----------( 215      ( 20 Nov 2020 13:10 )             46.0     11-20    141  |  102  |  23.0<H>  ----------------------------<  100<H>  4.5   |  29.0  |  1.09    Ca    9.5      20 Nov 2020 13:10  Mg     2.2     11-20    TPro  7.7  /  Alb  4.4  /  TBili  0.7  /  DBili  x   /  AST  29  /  ALT  20  /  AlkPhos  77  11-20  PT/INR - ( 20 Nov 2020 13:10 )   PT: 11.5 sec;   INR: 0.99 ratio    PTT - ( 20 Nov 2020 13:10 )  PTT:28.7 sec    70y  Male is now s/p left heart catheterization via right radial approach (no site complications; radial band in place) with Dr. Reno    -ADMIT due to: unstable angina/ACS <24 hours, mRCA lesion   -post cardiac cath orders  -right radial precautions  -bedrest x 1 hours post procedure  -EKG post cath  -labs and EKG in am  -Dual anti platelet therapy with aspirin/ plavix  -plavix 600mg 11/21 in am then change to plavix 75mg po daily thereafter  -statin therapy; atorvastatin 80mg while in patient; resume zocor on discharge  -beta blocker; metoprolol succinate 25mg po daily  amlodipine 5mg po daily  -follow up outpt in 2 weeks with Cardiologist, Dr. Salas   -[facility] following during hospitalization  -cardiac rehab info provided/referral and communication to cardiac rehab completed  -Discharge in am if overnight tele, EKG, labs all remain WNL

## 2020-11-20 NOTE — H&P PST ADULT - NSICDXFAMILYHX_GEN_ALL_CORE_FT
FAMILY HISTORY:  Father  Still living? No  Family history of COPD (chronic obstructive pulmonary disease), Age at diagnosis: Age Unknown  Family history of heart disease, Age at diagnosis: Age Unknown    Mother  Still living? No  Family history of cancer, Age at diagnosis: Age Unknown

## 2020-11-20 NOTE — ED ADULT NURSE REASSESSMENT NOTE - NS ED NURSE REASSESS COMMENT FT1
Report given to RN in cath lab. Pt POC explained and verbalized understanding. Pt awaiting transport.

## 2020-11-20 NOTE — CHART NOTE - NSCHARTNOTEFT_GEN_A_CORE
Called by bedside nurse co pt BP 90/41 and HR 39. Pt asymptomatic   Pt s/p left heart catheterization via right radial approach. stent x 1 mRCA 99% stenosis                                                                REVIEW OF SYMPTOMS: Asymptomatic   Cardiovascular:  No chest pain,  No dyspnea,  No fatigue, No syncope,  No palpitations, No dizziness, No Orthopnea, No Paroxsymal nocturnal dyspnea.  Respiratory:  No Dyspnea, No cough.  Genitourinary:  No dysuria, no hematuria.  Gastrointestinal:  No nausea, no vomiting. No diarrhea.  No abdominal pain.   Neurological: No headache, no dizziness, no slurred speech.  Psychiatric: No agitation, no anxiety.    ALL OTHER REVIEW OF SYSTEMS ARE NEGATIVE.    Vital Signs Last 24 Hrs  T(C): 36.9 (20 Nov 2020 21:45), Max: 36.9 (20 Nov 2020 21:45)  T(F): 98.5 (20 Nov 2020 21:45), Max: 98.5 (20 Nov 2020 21:45)  HR: 50 (20 Nov 2020 21:45) (39 - 72)  BP: 113/59 (20 Nov 2020 21:45) (90/49 - 173/92)  RR: 15 (20 Nov 2020 21:45) (15 - 20)  SpO2: 95% (20 Nov 2020 21:45) (95% - 100%)    PHYSICAL EXAM:  Constitutional: Comfortable . No acute distress.   HEENT: Atraumatic and normocephalic , neck is supple . no JVD.  PEERL   CNS: A&O x3. No focal neurologic deficits.   Respiratory: CTAB. No wheezing, rhonchi or crackles   Cardiovascular:S1S2 RRR. No murmur or rubs  R wrist: + mild pain. + pulses. warm. Dressing noted dry, clean and intact. No hematoma, edema, or bleeding.  Gastrointestinal: Soft non-tender and non distended . +Bowel sounds.   Extremities: + 1 edema b/l (baseline as per pt)   Psychiatric: Calm . no agitation.    A&P    Hypotension / bradycardic. Resolved  Pt  placed in Trendelenburg position and 500 ml NS bolus was given  After treatment pt /59 and HR 50    Mild pain R wrist  Tylenol PRN    Pt is hemodynamically stable. Continue monitor. Nurse aware

## 2020-11-20 NOTE — ED ADULT NURSE NOTE - OBJECTIVE STATEMENT
Pt presents with c/o intermittent L pectoral CP (radiates to L arm), palpitations x 2 weeks. Pt states he made appt with PMD due to symptoms, referred to cardiologist for halter monitoring. Pt states the halter was removed Wednesday and was asked to come in for EKG which was abnormal. He was then referred to go to [facility] for cardiac workup. Pt also states he has NO when exercising. Pt believes he becomes a bit more fatigued then usual. Pt denies any SOB at rest. Pt PMHx HTN, HLD.

## 2020-11-20 NOTE — CONSULT NOTE ADULT - ATTENDING COMMENTS
70M retired  with h/o HTN and venous insuff? had recent onset palpitations and exertional dyspnea for few weeks and L-sided chest discomfort 2 days ago had Holter done by PMD found with 9 beats PSVT And 4-5 beats NSVT, seen by Dr. Salas today in [facility] office sent to ED for evaluation, initial CTA chest done negative for PE and noted coronary artery calcification, Trop 0.03 and pBNP 773, given symptoms discussed with Dr. Doherty concerning for unstable angina, sent for LHC found with 99% mRCA stenosis s/p DANO x1  -continue DAPT least 1 year and atorvastatin, check LDL and A1c  -obtain TTE  -continue amlodipine for HTN, add metoprolol and may need ACEi as well, goal BP <130/80      Enrique Min DO, [facility]  Faculty Non-Invasive Cardiologist  [phone] 70M retired  with h/o HTN, HLD and venous insuff? had recent onset palpitations and exertional dyspnea for few weeks and L-sided chest discomfort 2 days ago had Holter done by PMD found with 9 beats PSVT And 4-5 beats NSVT, seen by Dr. Salas today in [facility] office sent to ED for evaluation, initial CTA chest done negative for PE and noted coronary artery calcification, Trop 0.03 and pBNP 773, given symptoms discussed with Dr. Doherty concerning for unstable angina, sent for C found with 99% mRCA stenosis s/p DANO x1  -continue DAPT least 1 year and atorvastatin, check LDL and A1c  -obtain TTE  -continue amlodipine for HTN, add metoprolol and may need ACEi as well, goal BP <130/80      Enrique Min DO, [facility]  Faculty Non-Invasive Cardiologist  [phone]

## 2020-11-20 NOTE — DISCHARGE NOTE PROVIDER - HOSPITAL COURSE
Narrative: 69 y/o obese M, former smoker (.5PPD x10 years; quit 40 years ago), with a PMHx of HTN, HLD, BPH,  family hx of heart disease (father: MI x2 at age 70; PPM) presented to the ED with c/o palpitations.  He reports he recently followed up with Dr. Salas and had a Holter Monitor placed (Tues 11/17) for his new onset palpitations. Dr. Souza called pt this am and advised him to come in through the ED.  His troponins have been negative, CT chest -, D-Dimer negative.  He reports he is active and bike rides as well playing golf; he has noticed his palpitations have increased in frequency and have woken him out of his sleep; they resolve spontaneously.  He formerly used to follow with Dr. Bauer ([facility]) and admits to having a "normal stress and echo back then" which was about 5 years ago. He has never had a cardiac catheterization.   In the ED, he was given aspirin 324mg and metoprolol 50mg.  Currently he is free from chest pain, SOB, palps, dyspnea, diaphoresis, jaw/back/shoulder pain; denies pre syncope, syncope, vision changes.  There is +2 edema noted bilaterally to his lower extremities.     now s/p left heart catheterization via right radial approach (no site complications; radial band in place) with Dr. Reno:  Right radial precautions  mRCA 99% stenosis  Resolute ana DANO placed 3.0 x18mm  normal LV gram

## 2020-11-20 NOTE — H&P PST ADULT - PRO ARRIVE FROM
physician office/Dr. Salas's office ([facility])/skilled nursing facility Dr. Salas's office ([facility] Physician)/physician office

## 2020-11-20 NOTE — CONSULT NOTE ADULT - SUBJECTIVE AND OBJECTIVE BOX
Very pleasant 70 year old male patient with a known history of HTN and HLD who presents with a two weeks history of sporadic palpitations. He reports they started suddenly 2 weeks ago at random times during the day. He would have several episodes per day. Not associated with any particular activity. The patient reports they would last a few minutes in duration and would stop by themselves. No associated sweating, dizziness or syncope with these events. He never felt these symptoms before. Denies any previous history of palpitations or syncope. These symptoms prompted him to go see his primary care physician and he wore a Holter Monitor. Monitor reveals short episodes of NSVT and SVT with rates around 145-155bpm although no tracings readily available    He also admits to unrelated chest pain described as a dullness as well as dyspnea and fatigue particularly after exercise and bike riding. Chest pain would resolve with rest. He reporst he had a stress test and echo performed with his former Cardiologist Dr. Bauer. These results were reported as negative.     PAST MEDICAL & SURGICAL HISTORY:  Wrist pain, right  Hypertension  S/P colonoscopy  S/P knee surgery  righty scope in 1990 and left meniscus tear in 1977  S/P hernia repair  1980    REVIEW OF SYSTEMS  General: - fever or chills  Skin/Breast: - rashes  Ophthalmologic: - blurred vision	  ENMT: - sore throat  Respiratory and Thorax: - dyspnea	  Cardiovascular: + chest pain, +palpitations, +NO, + LE edema  Gastrointestinal: - N/V/D/C  Genitourinary: - dysuria  Musculoskeletal: - arthritis  Neurological: - weaknesses  Psychiatric: - anxiety or depression    MEDICATIONS (Home)  ASA 81mg  Norvasc 5mg  Zocor 20mg    Allergies  No Known Allergies    SOCIAL HISTORY: former smoker. Stopped 40 years ago. Social ETOH. No illicit drug use. Retired     FAMILY HISTORY:  Family history of cancer (Mother)  uterus  Family history of heart disease (Father) - MI and PPM  Family history of COPD (chronic obstructive pulmonary disease) (Father)    Vital Signs Last 24 Hrs  T(C): 36.4 (20 Nov 2020 12:10), Max: 36.4 (20 Nov 2020 12:10)  T(F): 97.6 (20 Nov 2020 12:10), Max: 97.6 (20 Nov 2020 12:10)  HR: 72 (20 Nov 2020 12:10) (72 - 72)  BP: 173/92 (20 Nov 2020 12:10) (173/92 - 173/92)  RR: 20 (20 Nov 2020 12:10) (20 - 20)  SpO2: 98% (20 Nov 2020 12:10) (98% - 98%)    Physical Exam:  Constitutional: AAOx3, NAD  Neck: supple, No JVD  Cardiovascular: +S1S2 RRR, no murmurs, rubs, gallops   Pulmonary: CTA b/l, unlabored, no wheezes, rales. No rhonchi  Abdomen: +BS, soft NTND  Extremities: 1+ bilateral LE pitting edema  Neuro: non focal, speech clear, ASCENCIO x 4  Psych: appropriate mood and affect    LABS:                        15.2   6.86  )-----------( 215      ( 20 Nov 2020 13:10 )             46.0     PT/INR - ( 20 Nov 2020 13:10 )   PT: 11.5 sec;   INR: 0.99 ratio    PTT - ( 20 Nov 2020 13:10 )  PTT:28.7 sec    RADIOLOGY & ADDITIONAL STUDIES:  SR at 69bpm; QRSD 96ms; incomplete RBBB activation    Telemetry: SR with rates in the 60-70s    A/P  70 year old male patient with a known history of HTN and HLD who presents with a two weeks history of sporadic palpitations and chest pain    No telemetry evidence of SVT or NSVT while in ER. No Holter records to review    - Rule out ACS per Cardiology  - Start beta blocker for now  - Check TSH and TTE  - Consider ischemic work up  - No acute EP interventions at this time  - May follow up with EP at [facility] in future upon discharge Very pleasant 70 year old male patient with a known history of HTN and HLD who presents with a two weeks history of sporadic palpitations. He reports they started suddenly 2 weeks ago at random times during the day. He would have several episodes per day. Not associated with any particular activity. The patient reports they would last a few minutes in duration and would stop by themselves. No associated sweating, dizziness or syncope with these events. He never felt these symptoms before. Denies any previous history of palpitations or syncope. These symptoms prompted him to go see his primary care physician and he wore a Holter Monitor. Monitor reveals short episodes of NSVT and SVT with rates around 145-155bpm although no tracings readily available    He also admits to unrelated chest pain described as a dullness as well as dyspnea and fatigue particularly after exercise and bike riding. Chest pain would resolve with rest. He reporst he had a stress test and echo performed with his former Cardiologist Dr. Bauer. These results were reported as negative.     PAST MEDICAL & SURGICAL HISTORY:  Wrist pain, right  Hypertension  S/P colonoscopy  S/P knee surgery  righty scope in 1990 and left meniscus tear in 1977  S/P hernia repair  1980    REVIEW OF SYSTEMS  General: - fever or chills  Skin/Breast: - rashes  Ophthalmologic: - blurred vision	  ENMT: - sore throat  Respiratory and Thorax: - dyspnea	  Cardiovascular: + chest pain, +palpitations, +NO, + LE edema  Gastrointestinal: - N/V/D/C  Genitourinary: - dysuria  Musculoskeletal: - arthritis  Neurological: - weaknesses  Psychiatric: - anxiety or depression    MEDICATIONS (Home)  ASA 81mg  Norvasc 5mg  Zocor 20mg    Allergies  No Known Allergies    SOCIAL HISTORY: former smoker. Stopped 40 years ago. Social ETOH. No illicit drug use. Retired     FAMILY HISTORY:  Family history of cancer (Mother)  uterus  Family history of heart disease (Father) - MI and PPM  Family history of COPD (chronic obstructive pulmonary disease) (Father)    Vital Signs Last 24 Hrs  T(C): 36.4 (20 Nov 2020 12:10), Max: 36.4 (20 Nov 2020 12:10)  T(F): 97.6 (20 Nov 2020 12:10), Max: 97.6 (20 Nov 2020 12:10)  HR: 72 (20 Nov 2020 12:10) (72 - 72)  BP: 173/92 (20 Nov 2020 12:10) (173/92 - 173/92)  RR: 20 (20 Nov 2020 12:10) (20 - 20)  SpO2: 98% (20 Nov 2020 12:10) (98% - 98%)    Physical Exam:  Constitutional: AAOx3, NAD  Neck: supple, No JVD  Cardiovascular: +S1S2 RRR, no murmurs, rubs, gallops   Pulmonary: CTA b/l, unlabored, no wheezes, rales. No rhonchi  Abdomen: +BS, soft NTND, obese  Extremities: 2+ bilateral LE pitting edema  Neuro: non focal, speech clear, ASCENCIO x 4  Psych: appropriate mood and affect    LABS:                        15.2   6.86  )-----------( 215      ( 20 Nov 2020 13:10 )             46.0     PT/INR - ( 20 Nov 2020 13:10 )   PT: 11.5 sec;   INR: 0.99 ratio    PTT - ( 20 Nov 2020 13:10 )  PTT:28.7 sec    RADIOLOGY & ADDITIONAL STUDIES:  SR at 69bpm; QRSD 96ms; incomplete RBBB activation    Telemetry: SR with rates in the 60-70s    A/P  70 year old male patient with a known history of HTN and HLD who presents with a two weeks history of sporadic palpitations and chest pain    No telemetry evidence of SVT or NSVT while in ER. No Holter records to review    - Rule out ACS per Cardiology  - Start beta blocker for now  - Check TSH and TTE  - Consider ischemic work up  - No acute EP interventions at this time  - May follow up with EP at [facility] in future upon discharge [facility]                                                               CARDIAC ELECTROPHYSIOLOGY                                                       [facility] [place]                                                      Office: [address], [place] [address]                                                       Telephone: [phone]. Fax:[phone]    HPI:  Very pleasant 70 year old male patient with a known history of HTN and HLD who presents with a two weeks history of sporadic palpitations. He reports they started suddenly 2 weeks ago at random times during the day. He would have several episodes per day. Not associated with any particular activity. The patient reports they would last a few minutes in duration and would stop by themselves. No associated sweating, dizziness or syncope with these events. He never felt these symptoms before. Denies any previous history of palpitations or syncope. These symptoms prompted him to go see his primary care physician and he wore a Holter Monitor. Monitor reveals short episodes of NSVT and SVT with rates around 145-155bpm although no tracings readily available    He also admits to unrelated chest pain described as a dullness as well as dyspnea and fatigue particularly after exercise and bike riding. Chest pain would resolve with rest. He reporst he had a stress test and echo performed with his former Cardiologist Dr. Bauer. These results were reported as negative.     Holter: Non-sustained wide and narrow complex tachycardias. NSVT and paroxysmal AT. No AF.    PAST MEDICAL & SURGICAL HISTORY:  Wrist pain, right  Hypertension  S/P colonoscopy  S/P knee surgery  righty scope in 1990 and left meniscus tear in 1977  S/P hernia repair  1980    REVIEW OF SYSTEMS  General: - fever or chills  Skin/Breast: - rashes  Ophthalmologic: - blurred vision	  ENMT: - sore throat  Respiratory and Thorax: - dyspnea	  Cardiovascular: + chest pain, +palpitations, +NO, + LE edema  Gastrointestinal: - N/V/D/C  Genitourinary: - dysuria  Musculoskeletal: - arthritis  Neurological: - weaknesses  Psychiatric: - anxiety or depression    MEDICATIONS (Home)  ASA 81mg  Norvasc 5mg  Zocor 20mg    Allergies  No Known Allergies    SOCIAL HISTORY: former smoker. Stopped 40 years ago. Social ETOH. No illicit drug use. Retired     FAMILY HISTORY:  Family history of cancer (Mother)  uterus  Family history of heart disease (Father) - MI and PPM  Family history of COPD (chronic obstructive pulmonary disease) (Father)    Vital Signs Last 24 Hrs  T(C): 36.4 (20 Nov 2020 12:10), Max: 36.4 (20 Nov 2020 12:10)  T(F): 97.6 (20 Nov 2020 12:10), Max: 97.6 (20 Nov 2020 12:10)  HR: 72 (20 Nov 2020 12:10) (72 - 72)  BP: 173/92 (20 Nov 2020 12:10) (173/92 - 173/92)  RR: 20 (20 Nov 2020 12:10) (20 - 20)  SpO2: 98% (20 Nov 2020 12:10) (98% - 98%)    Physical Exam:  Constitutional: AAOx3, NAD  Neck: supple, No JVD  Cardiovascular: +S1S2 RRR, no murmurs, rubs, gallops   Pulmonary: CTA b/l, unlabored, no wheezes, rales. No rhonchi  Abdomen: +BS, soft NTND, obese  Extremities: 2+ bilateral LE pitting edema  Neuro: non focal, speech clear, ASCENCIO x 4  Psych: appropriate mood and affect    LABS:                        15.2   6.86  )-----------( 215      ( 20 Nov 2020 13:10 )             46.0     PT/INR - ( 20 Nov 2020 13:10 )   PT: 11.5 sec;   INR: 0.99 ratio    PTT - ( 20 Nov 2020 13:10 )  PTT:28.7 sec    RADIOLOGY & ADDITIONAL STUDIES:  SR at 69bpm; QRSD 96ms; incomplete RBBB activation    Telemetry: SR with rates in the 60-70s    A/P  70 year old male patient with a known history of HTN and HLD who presents with a two weeks history of sporadic palpitations and chest pain    No telemetry evidence of SVT or NSVT while in ER. No Holter records to review    - Rule out ACS per Cardiology  - Start beta blocker for now  - Check TSH and TTE  - Consider ischemic work up  - No acute EP interventions at this time  - May follow up with EP at [facility] in future upon discharge

## 2020-11-20 NOTE — H&P PST ADULT - RS GEN PE MLT RESP DETAILS PC
breath sounds equal/no chest wall tenderness/clear to auscultation bilaterally/normal/airway patent/good air movement/respirations non-labored

## 2020-11-20 NOTE — H&P PST ADULT - NSICDXPROBLEM_GEN_ALL_CORE_FT
PROBLEM DIAGNOSES  Problem: Palpitations  Assessment and Plan: -consent obtained per Dr. Reno  -left heart cardiac catheterization  to evaluate coronary vasculature  -procedure d/w patient, risks and benefits explained, questions answered  -asa load 325mg this am  -admit pt to Cardiology; Dr. Reno  -will discuss plan of care post procedure if intervention necessary

## 2020-11-21 ENCOUNTER — TRANSCRIPTION ENCOUNTER (OUTPATIENT)
Age: 70
End: 2020-11-21

## 2020-11-21 VITALS
RESPIRATION RATE: 16 BRPM | TEMPERATURE: 98 F | DIASTOLIC BLOOD PRESSURE: 63 MMHG | OXYGEN SATURATION: 98 % | SYSTOLIC BLOOD PRESSURE: 133 MMHG | HEART RATE: 68 BPM

## 2020-11-21 LAB
A1C WITH ESTIMATED AVERAGE GLUCOSE RESULT: 5.8 % — HIGH (ref 4–5.6)
ALBUMIN SERPL ELPH-MCNC: 3.6 G/DL — SIGNIFICANT CHANGE UP (ref 3.3–5.2)
ALP SERPL-CCNC: 62 U/L — SIGNIFICANT CHANGE UP (ref 40–120)
ALT FLD-CCNC: 17 U/L — SIGNIFICANT CHANGE UP
ANION GAP SERPL CALC-SCNC: 9 MMOL/L — SIGNIFICANT CHANGE UP (ref 5–17)
AST SERPL-CCNC: 23 U/L — SIGNIFICANT CHANGE UP
BILIRUB SERPL-MCNC: 0.9 MG/DL — SIGNIFICANT CHANGE UP (ref 0.4–2)
BUN SERPL-MCNC: 23 MG/DL — HIGH (ref 8–20)
CALCIUM SERPL-MCNC: 8.6 MG/DL — SIGNIFICANT CHANGE UP (ref 8.6–10.2)
CHLORIDE SERPL-SCNC: 104 MMOL/L — SIGNIFICANT CHANGE UP (ref 98–107)
CHOLEST SERPL-MCNC: 119 MG/DL — SIGNIFICANT CHANGE UP
CO2 SERPL-SCNC: 26 MMOL/L — SIGNIFICANT CHANGE UP (ref 22–29)
CREAT SERPL-MCNC: 1.32 MG/DL — HIGH (ref 0.5–1.3)
ESTIMATED AVERAGE GLUCOSE: 120 MG/DL — HIGH (ref 68–114)
GLUCOSE SERPL-MCNC: 97 MG/DL — SIGNIFICANT CHANGE UP (ref 70–99)
HCT VFR BLD CALC: 41.2 % — SIGNIFICANT CHANGE UP (ref 39–50)
HDLC SERPL-MCNC: 51 MG/DL — SIGNIFICANT CHANGE UP
HGB BLD-MCNC: 13.4 G/DL — SIGNIFICANT CHANGE UP (ref 13–17)
LIPID PNL WITH DIRECT LDL SERPL: 52 MG/DL — SIGNIFICANT CHANGE UP
MAGNESIUM SERPL-MCNC: 2.1 MG/DL — SIGNIFICANT CHANGE UP (ref 1.6–2.6)
MCHC RBC-ENTMCNC: 30.9 PG — SIGNIFICANT CHANGE UP (ref 27–34)
MCHC RBC-ENTMCNC: 32.5 GM/DL — SIGNIFICANT CHANGE UP (ref 32–36)
MCV RBC AUTO: 94.9 FL — SIGNIFICANT CHANGE UP (ref 80–100)
NON HDL CHOLESTEROL: 68 MG/DL — SIGNIFICANT CHANGE UP
PLATELET # BLD AUTO: 195 K/UL — SIGNIFICANT CHANGE UP (ref 150–400)
POTASSIUM SERPL-MCNC: 4.4 MMOL/L — SIGNIFICANT CHANGE UP (ref 3.5–5.3)
POTASSIUM SERPL-SCNC: 4.4 MMOL/L — SIGNIFICANT CHANGE UP (ref 3.5–5.3)
PROT SERPL-MCNC: 6.1 G/DL — LOW (ref 6.6–8.7)
RBC # BLD: 4.34 M/UL — SIGNIFICANT CHANGE UP (ref 4.2–5.8)
RBC # FLD: 13.6 % — SIGNIFICANT CHANGE UP (ref 10.3–14.5)
SODIUM SERPL-SCNC: 139 MMOL/L — SIGNIFICANT CHANGE UP (ref 135–145)
TRIGL SERPL-MCNC: 82 MG/DL — SIGNIFICANT CHANGE UP
WBC # BLD: 6.72 K/UL — SIGNIFICANT CHANGE UP (ref 3.8–10.5)
WBC # FLD AUTO: 6.72 K/UL — SIGNIFICANT CHANGE UP (ref 3.8–10.5)

## 2020-11-21 PROCEDURE — 99285 EMERGENCY DEPT VISIT HI MDM: CPT

## 2020-11-21 PROCEDURE — 93306 TTE W/DOPPLER COMPLETE: CPT | Mod: 26

## 2020-11-21 PROCEDURE — 71275 CT ANGIOGRAPHY CHEST: CPT

## 2020-11-21 PROCEDURE — 93005 ELECTROCARDIOGRAM TRACING: CPT

## 2020-11-21 PROCEDURE — 85025 COMPLETE CBC W/AUTO DIFF WBC: CPT

## 2020-11-21 PROCEDURE — 85610 PROTHROMBIN TIME: CPT

## 2020-11-21 PROCEDURE — 93306 TTE W/DOPPLER COMPLETE: CPT

## 2020-11-21 PROCEDURE — 99152 MOD SED SAME PHYS/QHP 5/>YRS: CPT

## 2020-11-21 PROCEDURE — 84484 ASSAY OF TROPONIN QUANT: CPT

## 2020-11-21 PROCEDURE — 93458 L HRT ARTERY/VENTRICLE ANGIO: CPT | Mod: XU

## 2020-11-21 PROCEDURE — 84443 ASSAY THYROID STIM HORMONE: CPT

## 2020-11-21 PROCEDURE — 99231 SBSQ HOSP IP/OBS SF/LOW 25: CPT

## 2020-11-21 PROCEDURE — 85379 FIBRIN DEGRADATION QUANT: CPT

## 2020-11-21 PROCEDURE — C1769: CPT

## 2020-11-21 PROCEDURE — 80053 COMPREHEN METABOLIC PANEL: CPT

## 2020-11-21 PROCEDURE — 71045 X-RAY EXAM CHEST 1 VIEW: CPT

## 2020-11-21 PROCEDURE — C1887: CPT

## 2020-11-21 PROCEDURE — C1725: CPT

## 2020-11-21 PROCEDURE — 83735 ASSAY OF MAGNESIUM: CPT

## 2020-11-21 PROCEDURE — 85730 THROMBOPLASTIN TIME PARTIAL: CPT

## 2020-11-21 PROCEDURE — C1894: CPT

## 2020-11-21 PROCEDURE — C1874: CPT

## 2020-11-21 PROCEDURE — C9600: CPT | Mod: RC

## 2020-11-21 PROCEDURE — 36415 COLL VENOUS BLD VENIPUNCTURE: CPT

## 2020-11-21 PROCEDURE — U0003: CPT

## 2020-11-21 PROCEDURE — 83036 HEMOGLOBIN GLYCOSYLATED A1C: CPT

## 2020-11-21 PROCEDURE — 83880 ASSAY OF NATRIURETIC PEPTIDE: CPT

## 2020-11-21 PROCEDURE — 80061 LIPID PANEL: CPT

## 2020-11-21 PROCEDURE — 85027 COMPLETE CBC AUTOMATED: CPT

## 2020-11-21 PROCEDURE — 93010 ELECTROCARDIOGRAM REPORT: CPT

## 2020-11-21 RX ORDER — SIMVASTATIN 20 MG/1
80 TABLET, FILM COATED ORAL
Qty: 7200 | Refills: 0
Start: 2020-11-21 | End: 2021-02-18

## 2020-11-21 RX ORDER — ATORVASTATIN CALCIUM 80 MG/1
1 TABLET, FILM COATED ORAL
Qty: 90 | Refills: 0
Start: 2020-11-21 | End: 2021-02-18

## 2020-11-21 RX ORDER — CLOPIDOGREL BISULFATE 75 MG/1
1 TABLET, FILM COATED ORAL
Qty: 90 | Refills: 0
Start: 2020-11-21 | End: 2021-02-18

## 2020-11-21 RX ORDER — METOPROLOL TARTRATE 50 MG
1 TABLET ORAL
Qty: 90 | Refills: 0
Start: 2020-11-21 | End: 2021-02-18

## 2020-11-21 RX ORDER — CLOPIDOGREL BISULFATE 75 MG/1
75 TABLET, FILM COATED ORAL DAILY
Refills: 0 | Status: DISCONTINUED | OUTPATIENT
Start: 2020-11-21 | End: 2020-11-21

## 2020-11-21 RX ORDER — SIMVASTATIN 20 MG/1
1 TABLET, FILM COATED ORAL
Qty: 0 | Refills: 0 | DISCHARGE

## 2020-11-21 RX ADMIN — Medication 81 MILLIGRAM(S): at 09:52

## 2020-11-21 RX ADMIN — CLOPIDOGREL BISULFATE 75 MILLIGRAM(S): 75 TABLET, FILM COATED ORAL at 09:52

## 2020-11-21 RX ADMIN — Medication 25 MILLIGRAM(S): at 09:52

## 2020-11-21 RX ADMIN — CLOPIDOGREL BISULFATE 600 MILLIGRAM(S): 75 TABLET, FILM COATED ORAL at 05:11

## 2020-11-21 RX ADMIN — AMLODIPINE BESYLATE 5 MILLIGRAM(S): 2.5 TABLET ORAL at 05:12

## 2020-11-21 NOTE — DISCHARGE NOTE NURSING/CASE MANAGEMENT/SOCIAL WORK - NSDCPEPTSTRK_GEN_ALL_CORE
Risk factors for stroke/Stroke support groups for patients, families, and friends/Prescribed medications/Stroke education booklet/Call 911 for stroke/Need for follow up after discharge/Stroke warning signs and symptoms/Signs and symptoms of stroke

## 2020-11-21 NOTE — PROGRESS NOTE ADULT - SUBJECTIVE AND OBJECTIVE BOX
Patient is a 70y old  Male who presents with a chief complaint of Palpitations/ chest pain  ACS <24 hours, unstable angina  urgent left heart cardiac catheterization (20 Nov 2020 18:54)    70y  Male is now s/p left heart catheterization via right radial approach (no site   mRCA 99% stenosis  Resolute ana DANO placed 3.0 x18mm    HPI:  Narrative: 69 y/o obese M, former smoker (.5PPD x10 years; quit 40 years ago), with a PMHx of HTN, HLD, BPH,  family hx of heart disease (father: MI x2 at age 70; PPM) presented to the ED with c/o palpitations.  He reports he recently followed up with Dr. Salas and had a Holter Monitor placed (Tues 11/17) for his new onset palpitations. Dr. Souza called pt this am and advised him to come in through the ED.  His troponins have been negative, CT chest -, D-Dimer negative.  He reports he is active and bike rides as well playing golf; he has noticed his palpitations have increased in frequency and have woken him out of his sleep; they resolve spontaneously.  He formerly used to follow with Dr. Bauer ([facility]) and admits to having a "normal stress and echo back then" which was about 5 years ago. He has never had a cardiac catheterization.   In the ED, he was given aspirin 324mg and metoprolol 50mg.  Currently he is free from chest pain, SOB, palps, dyspnea, diaphoresis, jaw/back/shoulder pain; denies pre syncope, syncope, vision changes.  There is +2 edema noted bilaterally to his lower extremities.     Home meds: asa, amlodipine, zocor    ASA 2  MALL 2  BRA 1.2% (20 Nov 2020 17:55)      PAST MEDICAL & SURGICAL HISTORY:  BPH (benign prostatic hyperplasia)    Former cigarette smoker    NSVT (nonsustained ventricular tachycardia)    HTN (hypertension)    Obesity    Wrist pain, right    Hypertension    H/O prostate biopsy    S/P colonoscopy    S/P knee surgery  righty scope in 1990 and left meniscus tear in 1977    S/P hernia repair  1980        PREVIOUS DIAGNOSTIC TESTING:    < from: Cardiac Cath Lab - Adult (11.20.20 @ 17:54) >  CORONARY VESSELS: The coronary circulation is right dominant.  LM:   --  LM: Normal.  LAD:   --  Mid LAD: There was a 40 % stenosis.  CX:   --  Mid circumflex: There was a 20 % stenosis.  RCA:   --  Mid RCA: There was a tubular 99 % stenosis.  COMPLICATIONS: No complications occurred during the cath lab visit. No  complications occurred during the cath lab visit.  DIAGNOSTIC IMPRESSIONS: Large RCA which wraps around apex. LAD is  intramyocardial and does not supply apex.  RCA with critical mid lesion. PCI with 1 DANO.  Mildly depressed LV function.  DIAGNOSTIC RECOMMENDATIONS: Aspirin and plavix.  Statin therapy. Low dose beta blockers for SVT.  INTERVENTIONAL IMPRESSIONS: Large RCA which wraps around apex. LAD is  intramyocardial and does not supply apex.  RCA with critical mid lesion. PCI with 1 DANO.  Mildly depressed LV function.  INTERVENTIONAL RECOMMENDATIONS: Aspirin and plavix.  Statin therapy. Low dose beta blockers for SVT.  Prepared and signed by  Kj Reno MD  Signed 11/20/2020 18:35:16    < end of copied text >        Allergies  No Known Allergies      MEDICATIONS  (STANDING):  amLODIPine   Tablet 5 milliGRAM(s) Oral daily  aspirin  chewable 81 milliGRAM(s) Oral daily  atorvastatin 80 milliGRAM(s) Oral at bedtime  metoprolol succinate ER 25 milliGRAM(s) Oral daily    MEDICATIONS  (PRN):  acetaminophen   Tablet .. 650 milliGRAM(s) Oral every 6 hours PRN Mild Pain (1 - 3)  ALPRAZolam 0.25 milliGRAM(s) Oral at bedtime PRN sleep/anxiety  aluminum hydroxide/magnesium hydroxide/simethicone Suspension 30 milliLiter(s) Oral every 4 hours PRN Dyspepsia  ondansetron Injectable 4 milliGRAM(s) IV Push every 6 hours PRN Nausea and/or Vomiting  oxyCODONE    IR 5 milliGRAM(s) Oral every 6 hours PRN Moderate Pain (4 - 6)    	    Vital Signs Last 24 Hrs  T(C): 36.8 (21 Nov 2020 07:22), Max: 36.9 (20 Nov 2020 21:45)  T(F): 98.2 (21 Nov 2020 07:22), Max: 98.5 (20 Nov 2020 21:45)  HR: 68 (21 Nov 2020 07:22) (39 - 72)  BP: 133/63 (21 Nov 2020 07:22) (90/49 - 173/92)    RR: 16 (21 Nov 2020 07:22) (14 - 20)  SpO2: 98% (21 Nov 2020 07:22) (94% - 100%)    I&O's Detail    20 Nov 2020 07:01  -  21 Nov 2020 07:00  --------------------------------------------------------  IN:    Oral Fluid: 120 mL    Sodium Chloride 0.9% Bolus: 500 mL  Total IN: 620 mL    OUT:    Voided (mL): 750 mL  Total OUT: 750 mL    Total NET: -130 mL      PHYSICAL EXAM:  Appearance: Normal, well nourished	  HEENT:   Normal oral mucosa, PERRL, EOMI, sclera non-icteric	  Lymphatic: No cervical lymphadenopathy  Cardiovascular: Normal S1 S2, No JVD, No cardiac murmurs, No carotid bruits, No peripheral edema  Respiratory: Lungs clear to auscultation	  Psychiatry: A & O x 3, Mood & affect appropriate  Gastrointestinal:  Soft, Non-tender, + BS, no bruits	  Skin: No rashes, No ecchymoses, No cyanosis  Neurologic: Grossly non-focal with full strength in all four extremities  Extremities: Normal range of motion, No clubbing, cyanosis or edema  Vascular: Peripheral pulses palpable 2+ bilaterally      INTERPRETATION OF TELEMETRY:  Sinus Bradley    ECG: EKG:  Sinus Bradycardia with Incomplete RBBB and PATRICIO, ST/T wave abnormalities in the Lateral leads        LABS:                        13.4   6.72  )-----------( 195      ( 21 Nov 2020 05:38 )             41.2     11-21    139  |  104  |  23.0<H>  ----------------------------<  97  4.4   |  26.0  |  1.32<H>    Ca    8.6      21 Nov 2020 05:38  Mg     2.1     11-21    TPro  6.1<L>  /  Alb  3.6  /  TBili  0.9  /  DBili  x   /  AST  23  /  ALT  17  /  AlkPhos  62  11-21    CARDIAC MARKERS ( 20 Nov 2020 13:10 )  x     / 0.03 ng/mL / x     / x     / x          PT/INR - ( 20 Nov 2020 13:10 )   PT: 11.5 sec;   INR: 0.99 ratio         PTT - ( 20 Nov 2020 13:10 )  PTT:28.7 sec    I&O's Summary    20 Nov 2020 07:01  -  21 Nov 2020 07:00  --------------------------------------------------------  IN: 620 mL / OUT: 750 mL / NET: -130 mL      BNPSerum Pro-Brain Natriuretic Peptide: 773 pg/mL (11-20 @ 15:47)    Serum Pro-Brain Natriuretic Peptide: 773 pg/mL (11-20-20 @ 15:47)    70y  Male is now s/p left heart catheterization via right radial approach (no site complications; ) with Dr. Reno  One DANO to the mRCA  ASA 81 mg po daily  Plavix 75 mg po daily  Metoprolol Succinate 25mg po daily  Zocor 80 mg po QHS  Amlodipine 5 mg po daily  Echocardiogram preformed - Results pending  Follow up with Dr Salas as OP in 1-2 weeks

## 2020-11-21 NOTE — DISCHARGE NOTE NURSING/CASE MANAGEMENT/SOCIAL WORK - PATIENT PORTAL LINK FT
You can access the FollowMyHealth Patient Portal offered by [facility] by registering at the following website: [email or web address]. By joining [facility]’s FollowMyHealth portal, you will also be able to view your health information using other applications (apps) compatible with our system.

## 2021-05-11 ENCOUNTER — APPOINTMENT (OUTPATIENT)
Dept: UROLOGY | Facility: CLINIC | Age: 71
End: 2021-05-11
Payer: MEDICARE

## 2021-05-11 DIAGNOSIS — N40.2 NODULAR PROSTATE W/OUT LOWER URINARY TRACT SYMPTOMS: ICD-10-CM

## 2021-05-11 PROBLEM — N40.0 BENIGN PROSTATIC HYPERPLASIA WITHOUT LOWER URINARY TRACT SYMPTOMS: Chronic | Status: ACTIVE | Noted: 2020-11-20

## 2021-05-11 PROBLEM — E66.9 OBESITY, UNSPECIFIED: Chronic | Status: ACTIVE | Noted: 2020-11-20

## 2021-05-11 PROBLEM — I10 ESSENTIAL (PRIMARY) HYPERTENSION: Chronic | Status: ACTIVE | Noted: 2020-11-20

## 2021-05-11 PROBLEM — I47.2 VENTRICULAR TACHYCARDIA: Chronic | Status: ACTIVE | Noted: 2020-11-20

## 2021-05-11 PROBLEM — Z87.891 PERSONAL HISTORY OF NICOTINE DEPENDENCE: Chronic | Status: ACTIVE | Noted: 2020-11-20

## 2021-05-11 PROCEDURE — 99072 ADDL SUPL MATRL&STAF TM PHE: CPT

## 2021-05-11 PROCEDURE — 99214 OFFICE O/P EST MOD 30 MIN: CPT

## 2021-05-11 RX ORDER — METOPROLOL TARTRATE 25 MG/1
25 TABLET, FILM COATED ORAL
Refills: 0 | Status: ACTIVE | COMMUNITY

## 2022-05-10 ENCOUNTER — APPOINTMENT (OUTPATIENT)
Dept: UROLOGY | Facility: CLINIC | Age: 72
End: 2022-05-10
Payer: MEDICARE

## 2022-05-10 DIAGNOSIS — Z86.79 PERSONAL HISTORY OF OTHER DISEASES OF THE CIRCULATORY SYSTEM: ICD-10-CM

## 2022-05-10 PROCEDURE — 99213 OFFICE O/P EST LOW 20 MIN: CPT

## 2022-05-10 RX ORDER — LOSARTAN POTASSIUM 100 MG/1
TABLET, FILM COATED ORAL
Refills: 0 | Status: ACTIVE | COMMUNITY

## 2022-05-10 RX ORDER — CLOPIDOGREL 75 MG/1
TABLET, FILM COATED ORAL
Refills: 0 | Status: COMPLETED | COMMUNITY
End: 2022-05-10

## 2023-05-16 ENCOUNTER — APPOINTMENT (OUTPATIENT)
Dept: UROLOGY | Facility: CLINIC | Age: 73
End: 2023-05-16
Payer: MEDICARE

## 2023-05-16 VITALS
HEART RATE: 76 BPM | BODY MASS INDEX: 38.22 KG/M2 | OXYGEN SATURATION: 94 % | SYSTOLIC BLOOD PRESSURE: 161 MMHG | HEIGHT: 70 IN | TEMPERATURE: 98 F | WEIGHT: 267 LBS | DIASTOLIC BLOOD PRESSURE: 73 MMHG

## 2023-05-16 PROCEDURE — 99213 OFFICE O/P EST LOW 20 MIN: CPT

## 2023-05-16 RX ORDER — SIMVASTATIN 40 MG/1
TABLET, FILM COATED ORAL
Refills: 0 | Status: COMPLETED | COMMUNITY
End: 2023-05-16

## 2023-05-16 RX ORDER — ATORVASTATIN CALCIUM 40 MG/1
40 TABLET, FILM COATED ORAL
Refills: 0 | Status: ACTIVE | COMMUNITY

## 2023-05-16 RX ORDER — PSYLLIUM HUSK 0.4 G
CAPSULE ORAL
Refills: 0 | Status: COMPLETED | COMMUNITY
End: 2023-05-16

## 2023-05-16 NOTE — HISTORY OF PRESENT ILLNESS
[FreeTextEntry1] : TAMRA PERKINS returns to the office today. He is a 72 year old man with prior history of PSA elevations. He had a prostate MRI in 2018 that had shown no suspicious lesions, PIRADS category 2 and a prostate volume of 75cm. Tissue biopsy of the prostate in 2019 had shown all benign prostate tissue. \par \par His PSA has been declining without Finasteride, in the beginning of the month it was 4.1ng/mL. In 2019 it was 11.7ng/mL last year it was 5.7ng/mL. \par \par He has no significant urinary bother at this time. He denies urinary urgency, frequency, hematuria, or dysuria. There is no incontinence. He denies hesitancy or incomplete bladder emptying.

## 2023-05-16 NOTE — ASSESSMENT
[FreeTextEntry1] : The PSA level done just recently is 4.10 ng/mL.  With a prostate size previously noted at 75 cm³, he remains with a low density.  The decrease is reassuring.  He is no longer on finasteride making the continued decrease in the PSA level even more reassuring.  In my opinion, I would recommend that he does not need to consider undergoing any repeat additional work-up such as prostate imaging or biopsy at this time.\par \par I would continue to recommend that the patient have an annual PSA level done for prostate cancer screening purposes.\par \par

## 2023-05-16 NOTE — LETTER BODY
[Dear  ___] : Dear  [unfilled], [Please see my note below.] : Please see my note below. [FreeTextEntry2] : Dejuan Howe MD\par [address]\[place] [address] [FreeTextEntry3] : Sincerely,\par \par \par \par Román Singh MD, FACS\par Chief of Urology, [facility]\par  of Urology\par \par [facility]\par [facility]\par [address] [place] [address]\[place] [address]\par P: [phone]\par F: [phone]\par [facility]\par

## 2023-08-25 ENCOUNTER — OUTPATIENT (OUTPATIENT)
Dept: OUTPATIENT SERVICES | Facility: HOSPITAL | Age: 73
LOS: 1 days | End: 2023-08-25
Payer: COMMERCIAL

## 2023-08-25 DIAGNOSIS — R01.1 CARDIAC MURMUR, UNSPECIFIED: ICD-10-CM

## 2023-08-25 DIAGNOSIS — Z98.89 OTHER SPECIFIED POSTPROCEDURAL STATES: Chronic | ICD-10-CM

## 2023-08-25 DIAGNOSIS — Z98.890 OTHER SPECIFIED POSTPROCEDURAL STATES: Chronic | ICD-10-CM

## 2023-08-25 PROCEDURE — 93306 TTE W/DOPPLER COMPLETE: CPT | Mod: 26

## 2023-08-25 PROCEDURE — C8929: CPT

## 2024-02-09 ENCOUNTER — INPATIENT (INPATIENT)
Facility: HOSPITAL | Age: 74
LOS: 8 days | Discharge: NURSING, REHAB OR LONG-TERM CARE | DRG: 322 | End: 2024-02-18
Attending: HOSPITALIST | Admitting: STUDENT IN AN ORGANIZED HEALTH CARE EDUCATION/TRAINING PROGRAM
Payer: MEDICARE

## 2024-02-09 VITALS
WEIGHT: 255.07 LBS | DIASTOLIC BLOOD PRESSURE: 84 MMHG | HEART RATE: 78 BPM | OXYGEN SATURATION: 97 % | RESPIRATION RATE: 20 BRPM | HEIGHT: 70 IN | TEMPERATURE: 98 F | SYSTOLIC BLOOD PRESSURE: 154 MMHG

## 2024-02-09 DIAGNOSIS — Z98.890 OTHER SPECIFIED POSTPROCEDURAL STATES: Chronic | ICD-10-CM

## 2024-02-09 DIAGNOSIS — Z98.89 OTHER SPECIFIED POSTPROCEDURAL STATES: Chronic | ICD-10-CM

## 2024-02-09 DIAGNOSIS — I48.91 UNSPECIFIED ATRIAL FIBRILLATION: ICD-10-CM

## 2024-02-09 LAB
ALBUMIN SERPL ELPH-MCNC: 4 G/DL — SIGNIFICANT CHANGE UP (ref 3.3–5.2)
ALP SERPL-CCNC: 92 U/L — SIGNIFICANT CHANGE UP (ref 40–120)
ALT FLD-CCNC: 24 U/L — SIGNIFICANT CHANGE UP
ANION GAP SERPL CALC-SCNC: 11 MMOL/L — SIGNIFICANT CHANGE UP (ref 5–17)
APTT BLD: 27 SEC — SIGNIFICANT CHANGE UP (ref 24.5–35.6)
AST SERPL-CCNC: 27 U/L — SIGNIFICANT CHANGE UP
BASE EXCESS BLDV CALC-SCNC: 3.8 MMOL/L — HIGH (ref -2–3)
BASOPHILS # BLD AUTO: 0.09 K/UL — SIGNIFICANT CHANGE UP (ref 0–0.2)
BASOPHILS NFR BLD AUTO: 1.2 % — SIGNIFICANT CHANGE UP (ref 0–2)
BILIRUB SERPL-MCNC: 1.6 MG/DL — SIGNIFICANT CHANGE UP (ref 0.4–2)
BUN SERPL-MCNC: 25.9 MG/DL — HIGH (ref 8–20)
CA-I SERPL-SCNC: 1.17 MMOL/L — SIGNIFICANT CHANGE UP (ref 1.15–1.33)
CALCIUM SERPL-MCNC: 9.3 MG/DL — SIGNIFICANT CHANGE UP (ref 8.4–10.5)
CHLORIDE BLDV-SCNC: 103 MMOL/L — SIGNIFICANT CHANGE UP (ref 96–108)
CHLORIDE SERPL-SCNC: 100 MMOL/L — SIGNIFICANT CHANGE UP (ref 96–108)
CO2 SERPL-SCNC: 28 MMOL/L — SIGNIFICANT CHANGE UP (ref 22–29)
CREAT SERPL-MCNC: 1.41 MG/DL — HIGH (ref 0.5–1.3)
EGFR: 53 ML/MIN/1.73M2 — LOW
EOSINOPHIL # BLD AUTO: 0.23 K/UL — SIGNIFICANT CHANGE UP (ref 0–0.5)
EOSINOPHIL NFR BLD AUTO: 3 % — SIGNIFICANT CHANGE UP (ref 0–6)
GAS PNL BLDV: 139 MMOL/L — SIGNIFICANT CHANGE UP (ref 136–145)
GAS PNL BLDV: SIGNIFICANT CHANGE UP
GLUCOSE BLDV-MCNC: 97 MG/DL — SIGNIFICANT CHANGE UP (ref 70–99)
GLUCOSE SERPL-MCNC: 97 MG/DL — SIGNIFICANT CHANGE UP (ref 70–99)
HCO3 BLDV-SCNC: 30 MMOL/L — HIGH (ref 22–29)
HCT VFR BLD CALC: 43.1 % — SIGNIFICANT CHANGE UP (ref 39–50)
HCT VFR BLDA CALC: 43 % — SIGNIFICANT CHANGE UP
HGB BLD CALC-MCNC: 14.3 G/DL — SIGNIFICANT CHANGE UP (ref 12.6–17.4)
HGB BLD-MCNC: 14 G/DL — SIGNIFICANT CHANGE UP (ref 13–17)
IMM GRANULOCYTES NFR BLD AUTO: 0.4 % — SIGNIFICANT CHANGE UP (ref 0–0.9)
INR BLD: 1.11 RATIO — SIGNIFICANT CHANGE UP (ref 0.85–1.18)
LACTATE BLDV-MCNC: 1.9 MMOL/L — SIGNIFICANT CHANGE UP (ref 0.5–2)
LYMPHOCYTES # BLD AUTO: 1.75 K/UL — SIGNIFICANT CHANGE UP (ref 1–3.3)
LYMPHOCYTES # BLD AUTO: 22.7 % — SIGNIFICANT CHANGE UP (ref 13–44)
MAGNESIUM SERPL-MCNC: 2.2 MG/DL — SIGNIFICANT CHANGE UP (ref 1.6–2.6)
MCHC RBC-ENTMCNC: 31.6 PG — SIGNIFICANT CHANGE UP (ref 27–34)
MCHC RBC-ENTMCNC: 32.5 GM/DL — SIGNIFICANT CHANGE UP (ref 32–36)
MCV RBC AUTO: 97.3 FL — SIGNIFICANT CHANGE UP (ref 80–100)
MONOCYTES # BLD AUTO: 0.73 K/UL — SIGNIFICANT CHANGE UP (ref 0–0.9)
MONOCYTES NFR BLD AUTO: 9.5 % — SIGNIFICANT CHANGE UP (ref 2–14)
NEUTROPHILS # BLD AUTO: 4.87 K/UL — SIGNIFICANT CHANGE UP (ref 1.8–7.4)
NEUTROPHILS NFR BLD AUTO: 63.2 % — SIGNIFICANT CHANGE UP (ref 43–77)
NT-PROBNP SERPL-SCNC: 3168 PG/ML — HIGH (ref 0–300)
PCO2 BLDV: 58 MMHG — HIGH (ref 42–55)
PH BLDV: 7.32 — SIGNIFICANT CHANGE UP (ref 7.32–7.43)
PLATELET # BLD AUTO: 182 K/UL — SIGNIFICANT CHANGE UP (ref 150–400)
PO2 BLDV: 51 MMHG — HIGH (ref 25–45)
POTASSIUM BLDV-SCNC: 4.8 MMOL/L — SIGNIFICANT CHANGE UP (ref 3.5–5.1)
POTASSIUM SERPL-MCNC: 4.3 MMOL/L — SIGNIFICANT CHANGE UP (ref 3.5–5.3)
POTASSIUM SERPL-SCNC: 4.3 MMOL/L — SIGNIFICANT CHANGE UP (ref 3.5–5.3)
PROT SERPL-MCNC: 7.7 G/DL — SIGNIFICANT CHANGE UP (ref 6.6–8.7)
PROTHROM AB SERPL-ACNC: 12.3 SEC — SIGNIFICANT CHANGE UP (ref 9.5–13)
RAPID RVP RESULT: SIGNIFICANT CHANGE UP
RBC # BLD: 4.43 M/UL — SIGNIFICANT CHANGE UP (ref 4.2–5.8)
RBC # FLD: 13 % — SIGNIFICANT CHANGE UP (ref 10.3–14.5)
SAO2 % BLDV: 79.4 % — SIGNIFICANT CHANGE UP
SARS-COV-2 RNA SPEC QL NAA+PROBE: SIGNIFICANT CHANGE UP
SODIUM SERPL-SCNC: 139 MMOL/L — SIGNIFICANT CHANGE UP (ref 135–145)
TROPONIN T, HIGH SENSITIVITY RESULT: 58 NG/L — HIGH (ref 0–51)
TROPONIN T, HIGH SENSITIVITY RESULT: 59 NG/L — HIGH (ref 0–51)
TSH SERPL-MCNC: 1.91 UIU/ML — SIGNIFICANT CHANGE UP (ref 0.27–4.2)
WBC # BLD: 7.7 K/UL — SIGNIFICANT CHANGE UP (ref 3.8–10.5)
WBC # FLD AUTO: 7.7 K/UL — SIGNIFICANT CHANGE UP (ref 3.8–10.5)

## 2024-02-09 PROCEDURE — 71046 X-RAY EXAM CHEST 2 VIEWS: CPT | Mod: 26

## 2024-02-09 PROCEDURE — 99223 1ST HOSP IP/OBS HIGH 75: CPT

## 2024-02-09 RX ORDER — LOSARTAN POTASSIUM 100 MG/1
50 TABLET, FILM COATED ORAL DAILY
Refills: 0 | Status: ACTIVE | OUTPATIENT
Start: 2024-02-09 | End: 2025-01-07

## 2024-02-09 RX ORDER — APIXABAN 2.5 MG/1
5 TABLET, FILM COATED ORAL
Refills: 0 | Status: DISCONTINUED | OUTPATIENT
Start: 2024-02-09 | End: 2024-02-11

## 2024-02-09 RX ORDER — METOPROLOL TARTRATE 50 MG
50 TABLET ORAL DAILY
Refills: 0 | Status: DISCONTINUED | OUTPATIENT
Start: 2024-02-09 | End: 2024-02-14

## 2024-02-09 RX ORDER — ATORVASTATIN CALCIUM 80 MG/1
40 TABLET, FILM COATED ORAL AT BEDTIME
Refills: 0 | Status: DISCONTINUED | OUTPATIENT
Start: 2024-02-09 | End: 2024-02-18

## 2024-02-09 RX ORDER — ASPIRIN/CALCIUM CARB/MAGNESIUM 324 MG
81 TABLET ORAL DAILY
Refills: 0 | Status: DISCONTINUED | OUTPATIENT
Start: 2024-02-09 | End: 2024-02-18

## 2024-02-09 RX ADMIN — Medication 50 MILLIGRAM(S): at 18:02

## 2024-02-09 RX ADMIN — APIXABAN 5 MILLIGRAM(S): 2.5 TABLET, FILM COATED ORAL at 18:02

## 2024-02-09 RX ADMIN — ATORVASTATIN CALCIUM 40 MILLIGRAM(S): 80 TABLET, FILM COATED ORAL at 22:47

## 2024-02-09 NOTE — ED PROVIDER NOTE - ATTENDING CONTRIBUTION TO CARE
I personally saw the patient with the resident, and completed the key components of the history and physical exam. I then discussed the management plan with the resident.    72 y/o M with PMH CAD s/p 1 stent, SVT, NSVT presents from Dr. Souza presents for new onset A fib in the office, c/o NO x 1 week, notes his LE edema is chronic, denies orthopnea or chest pain. Not on AC, but is on a beta blocker.    PE - NAD, well appearing, satting well on room air, irregularly irregular rate and rhythm, 2+ b/l LE edema, no calf TTP, 2+ symmetrical distal pulses.    EKG in A fib, no history - rate controlled, will check labs, CXR, cardiology consultation and reassess.

## 2024-02-09 NOTE — ED CDU PROVIDER INITIAL DAY NOTE - PHYSICAL EXAMINATION
Gen: No acute distress, non toxic  Head: NCAT  Eyes: pink conjunctivae, EOMI, PERRL  CV: irregularly irregular rhythm, nl s1/s2. 2+ b/l LE edema R>L  Resp: CTAB, normal rate and effort  Neuro: A&O x 3, sensorimotor intact without deficits   MSK: No spine or joint tenderness to palpation, Full ROM ext x 4  Skin: No rashes. intact and perfused.

## 2024-02-09 NOTE — ED ADULT NURSE REASSESSMENT NOTE - NS ED NURSE REASSESS COMMENT FT1
Assumed care for pt at 1930. PT is a&o x4 on cardiac monitor reading 67 bpm in atrial fibrillation. Pt awaiting echo and possible admission; pt aware of plan of care. Pt bed locked and in lowest position. Pt on  at 97% on RA.

## 2024-02-09 NOTE — ED PROVIDER NOTE - CLINICAL SUMMARY MEDICAL DECISION MAKING FREE TEXT BOX
73/M, pmhx of CAD s/p PCI 1DES rca, htn, hld, svt, nsvt sent in from cardiologist ( dr. benedict) office for new onse Afib RVR. Evaluated for new onset afib, HF and ACS. Cardiology consulted. EKG with afib, pt comfortable, examination unremarkable. Blood work, chest xray, RVP. Candidate for AC, no risk factors.

## 2024-02-09 NOTE — CONSULT NOTE ADULT - ASSESSMENT
This is a 73/M, pmhx of CAD s/p PCI 1DES rca, htn, hld, svt, nsvt sent in from cardiologist ( dr. benedict) office for new onse Afib RVR. Reports some dyspnea on exertion since a week, progressively worsening these past couple of days with pedal edema.

## 2024-02-09 NOTE — ED PROVIDER NOTE - PROGRESS NOTE DETAILS
Karol: I spoke with cardiology NP and Dr. Bess who recommend 24 hrs tele monitoring and echo, if echo is WNL then outpatient stress - no plan for cath during this admission.

## 2024-02-09 NOTE — ED PROVIDER NOTE - OBJECTIVE STATEMENT
73/M, pmhx of CAD s/p PCI 1DES rca, htn, hld, svt, nsvt sent in from cardiologist ( dr. benedict) office for new onse Afib RVR. Reports some dyspnea on exertion since a week, progressively worsening these past couple of days with pedal edema. No orthopnea, chest pain, palpitations, dizziness or syncope, fever, chills, SOB.

## 2024-02-09 NOTE — ED PROVIDER NOTE - PHYSICAL EXAMINATION
Const: Awake, alert and oriented. Well appearing.  HEENT: NC/AT. Moist mucous membranes.  Eyes: No scleral icterus. EOMI.  Neck:. Soft and supple. Full ROM without pain.  Cardiac: Irregular normal rate. +S1/S2. Peripheral pulses 2+ and symmetric. No LE edema.  Resp: Speaking in full sentences. No evidence of respiratory distress. No wheezes, rales or rhonchi.  Abd: Soft, non-tender, non-distended. No guarding or rebound.  Back: Spine midline and non-tender.   Skin: No rashes, abrasions or lacerations.  Neuro: Awake, alert & oriented x 3. Moves all extremities symmetrically.

## 2024-02-09 NOTE — CONSULT NOTE ADULT - PROBLEM SELECTOR RECOMMENDATION 9
- new onset atrial fibrillation  - pt is asymptomatic  - rates are 80s but at times as high as 170 for brief 2 min segements  - check echo  - increase toprol to 50mg daily  - start eliquis 5mg BID   - has hx of CAD, has not evaluated coronary vasculature since 2020, ?new onset afib secondary to underlying ischemia  - trend troponins and check echo, if abnormal will need to stay for noninvasive vs invasive ischemic eval   - if persistent rvr will need more rate control meds and eval for JAMA/DCCV

## 2024-02-09 NOTE — ED CDU PROVIDER INITIAL DAY NOTE - OBJECTIVE STATEMENT
74yo M PMHx CAD s/p mid-RCA stent 2022, HTN, HLD presents to ED c/o SOB, NO x past ~2 weeks, worsening over past 7 days. Had routine f/u appt with cardiologist Dr. Salas today and was found to have new onset afib rvr on EKG performed in office and sent to ED. Pt with rate controlled afib in ED. Trop delta 1 (58, 59). BNP 3168, CXR without pulmonary edema. Also reports chronic LE edema R>L which pt feels is unchanged from baseline. [facility] recommending TTE, tele monitoring as well as increasing toprol to 50mg daily and starting eliquis 5mg BID. Denies fever, chills, orthopnea, CP, Syncope, diaphoresis, n/v, abdominal pain, back pain, dizziness.

## 2024-02-09 NOTE — ED ADULT TRIAGE NOTE - NS ED NURSE BANDS TYPE
Name band; 47-year-old female with history of hysterectomy presents with suprapubic pain and vaginal discharge.  Reports onset 3 days ago.  Reports vaginal discharge was white now becoming more yellow.  Reports vaginal itching and pain when she urinates.  Denies any fever vomiting diarrhea.  Denies any hematuria or vaginal bleeding.  Denies any history of STI.

## 2024-02-09 NOTE — ED ADULT NURSE NOTE - OBJECTIVE STATEMENT
Pt A&Ox4, rr even and unlabored. Pt was at cardiologist and was sent to the ER after EKG showed Afib. Pt c/o NO and SOB. PMHx of HTN, BPH. Denies CP, numbness/tingling. Patient is on aspirin 81mg. Patient is afib on monitor. Labs sent, will medicate per orders.

## 2024-02-09 NOTE — ED ADULT NURSE NOTE - NSFALLUNIVINTERV_ED_ALL_ED
Bed/Stretcher in lowest position, wheels locked, appropriate side rails in place/Call bell, personal items and telephone in reach/Instruct patient to call for assistance before getting out of bed/chair/stretcher/Non-slip footwear applied when patient is off stretcher/[place] to call system/Physically safe environment - no spills, clutter or unnecessary equipment/Purposeful proactive rounding/Room/bathroom lighting operational, light cord in reach

## 2024-02-09 NOTE — ED CDU PROVIDER INITIAL DAY NOTE - CLINICAL SUMMARY MEDICAL DECISION MAKING FREE TEXT BOX
72yo M PMHx CAD s/p mid-RCA stent 2022, HTN, HLD presents to ED c/o SOB, NO x past ~2 weeks, worsening over past 7 days. Had routine f/u appt with cardiologist Dr. Salas today and was found to have new onset afib rvr on EKG performed in office and sent to ED. Pt with rate controlled afib in ED. Trop delta 1 (58, 59). BNP 3168, CXR without pulmonary edema. Also reports chronic LE edema R>L which pt feels is unchanged from baseline. [facility] recommending TTE, tele monitoring as well as increasing toprol to 50mg daily and starting eliquis 5mg BID.

## 2024-02-10 ENCOUNTER — RESULT REVIEW (OUTPATIENT)
Age: 74
End: 2024-02-10

## 2024-02-10 DIAGNOSIS — R09.89 OTHER SPECIFIED SYMPTOMS AND SIGNS INVOLVING THE CIRCULATORY AND RESPIRATORY SYSTEMS: ICD-10-CM

## 2024-02-10 DIAGNOSIS — I48.91 UNSPECIFIED ATRIAL FIBRILLATION: ICD-10-CM

## 2024-02-10 LAB
TROPONIN T, HIGH SENSITIVITY RESULT: 62 NG/L — HIGH (ref 0–51)
TROPONIN T, HIGH SENSITIVITY RESULT: 68 NG/L — HIGH (ref 0–51)

## 2024-02-10 PROCEDURE — 99284 EMERGENCY DEPT VISIT MOD MDM: CPT

## 2024-02-10 PROCEDURE — 99233 SBSQ HOSP IP/OBS HIGH 50: CPT

## 2024-02-10 PROCEDURE — 99223 1ST HOSP IP/OBS HIGH 75: CPT

## 2024-02-10 PROCEDURE — 71275 CT ANGIOGRAPHY CHEST: CPT | Mod: 26

## 2024-02-10 PROCEDURE — 93306 TTE W/DOPPLER COMPLETE: CPT | Mod: 26

## 2024-02-10 RX ORDER — LANOLIN ALCOHOL/MO/W.PET/CERES
3 CREAM (GRAM) TOPICAL AT BEDTIME
Refills: 0 | Status: DISCONTINUED | OUTPATIENT
Start: 2024-02-10 | End: 2024-02-18

## 2024-02-10 RX ORDER — ATORVASTATIN CALCIUM 80 MG/1
1 TABLET, FILM COATED ORAL
Refills: 0 | DISCHARGE

## 2024-02-10 RX ORDER — ONDANSETRON 8 MG/1
4 TABLET, FILM COATED ORAL EVERY 8 HOURS
Refills: 0 | Status: DISCONTINUED | OUTPATIENT
Start: 2024-02-10 | End: 2024-02-18

## 2024-02-10 RX ORDER — ACETAMINOPHEN 500 MG
650 TABLET ORAL EVERY 6 HOURS
Refills: 0 | Status: DISCONTINUED | OUTPATIENT
Start: 2024-02-10 | End: 2024-02-18

## 2024-02-10 RX ADMIN — APIXABAN 5 MILLIGRAM(S): 2.5 TABLET, FILM COATED ORAL at 18:07

## 2024-02-10 RX ADMIN — LOSARTAN POTASSIUM 50 MILLIGRAM(S): 100 TABLET, FILM COATED ORAL at 05:21

## 2024-02-10 RX ADMIN — APIXABAN 5 MILLIGRAM(S): 2.5 TABLET, FILM COATED ORAL at 05:21

## 2024-02-10 RX ADMIN — Medication 81 MILLIGRAM(S): at 11:23

## 2024-02-10 RX ADMIN — ATORVASTATIN CALCIUM 40 MILLIGRAM(S): 80 TABLET, FILM COATED ORAL at 21:21

## 2024-02-10 RX ADMIN — Medication 50 MILLIGRAM(S): at 05:21

## 2024-02-10 NOTE — H&P ADULT - ASSESSMENT
73yoM w/ PMHx of CAD s/p PCI 1DES to RCA, HTN, HLD, NSVT, chronic lymphedema sent in from cardiologist (Dr. benedict) office for new onse Afib RVR. Echo showing RV strain, concern for PE.     #New onset Afib with CHADSVASC 4   - in Afib with paroxysms of RVR; possible in setting of PE   - trops 58-> 59, BNP 3000s  - c/w toprol XL 50mg po q daily   - echo concerning for PE, CT PE pending   - maintain on tele    #?PE  - f/u CT angio  - currently on eliquis, if pt has PE, will need to switch to heparin gtt and PERT consult for mechanical thrombectomy v. catheter thrombolysis   - f/u trops w/ EKG   - maintain on tele     #CAD s/p PCI to the RCA   - trop 58->59  - EKG shows Afib with LAFB with non specific ST depressions   - c/w aspirin, atorva, metoprolol     #CKD, baseline CR 1.4  - currently at baseline   - continue to monitor, will be getting contract for PE study today     #HTN   - c/w HCTZ 12.5 and Losartan 50mg poq daily     DVT ppx: eliquis; may switch to hep gtt if +PE 73yoM w/ PMHx of CAD s/p PCI 1DES to RCA, HTN, HLD, NSVT, chronic lymphedema sent in from cardiologist (Dr. benedict) office for new onse Afib RVR. Echo showing RV strain, concern for PE.     #New onset Afib with CHADSVASC 4   - in Afib with paroxysms of RVR; a fib insetting of PE v. PE in setting of a fib; denies any long flights, drove ~5hours to [facility], taking breaks in between, works as a  but drives 40min-1hour at a time max, denies any recent surgeries, active malignancy he is aware of   - trops 58-> 59, BNP 3000s  - c/w toprol XL 50mg po q daily   - echo concerning for PE, CT PE pending   - maintain on tele    #?PE  - f/u CT angio  - currently on eliquis, if pt has PE, will need to switch to heparin gtt and PERT consult for mechanical thrombectomy v. catheter thrombolysis   - f/u trops w/ EKG   - maintain on tele, continuous pulse ox for now   - may need to be upgraded if hemodynamics change or if interventions for PE needed     #CAD s/p PCI to the RCA   - trop 58->59  - EKG shows Afib with LAFB with non specific ST depressions   - c/w aspirin, atorva, metoprolol     #CKD, baseline CR 1.4  - currently at baseline   - continue to monitor, will be getting contract for PE study today     #HTN   - c/w HCTZ 12.5 and Losartan 50mg poq daily     DVT ppx: eliquis; may switch to hep gtt if +PE    HCP: friend Zack Lali [phone]  code: FULL 73yoM w/ PMHx of CAD s/p PCI 1DES to RCA, HTN, HLD, NSVT, chronic lymphedema sent in from cardiologist (Dr. benedict) office for new onse Afib RVR. Echo showing RV strain, concern for PE.     #New onset Afib with CHADSVASC 4   - in Afib with paroxysms of RVR; a fib insetting of PE v. PE in setting of a fib; denies any long flights, drove ~5hours to [facility] [place], taking breaks in between, works as a  but drives 40min-1hour at a time max, denies any recent surgeries, active malignancy he is aware of   - trops 58-> 59, BNP 3000s  - c/w toprol XL 50mg po q daily   - echo concerning for PE, CT PE pending   - maintain on tele    #RV dysfunction  #Pulm HTN  - currently on eliquis, if pt has PE, will need to switch to heparin gtt and possible PERT consult for mechanical thrombectomy v. catheter thrombolysis per cards   - if no PE, will need to eval for other causes of pulm htn/RV strain: pt smoker 1ppd x10 years, no hx of COPD or pulm fibrosis; reports snoring, no dx of MICHELLE; consider pulm consult if negative for PE; f/u with cards regarding RHC/LHC  - f/u trops w/ EKG, trend to peak   - maintain on tele, continuous pulse ox for now     #CAD s/p PCI to the RCA   - trop 58->59  - EKG shows Afib with LAFB with non specific ST depressions   - c/w aspirin, atorva, metoprolol     #CKD, baseline CR 1.4  - currently at baseline   - continue to monitor, will be getting contrast for PE study today     #HTN   - c/w HCTZ 12.5 and Losartan 50mg poq daily     DVT ppx: eliquis    HCP: friend Zack Escalera [phone]  code: FULL

## 2024-02-10 NOTE — ED CDU PROVIDER SUBSEQUENT DAY NOTE - HISTORY
Pt resting comfortably at time of re-assessment. No events overnight. Pending  Will continue to monitor.

## 2024-02-10 NOTE — ED ADULT NURSE REASSESSMENT NOTE - NS ED NURSE REASSESS COMMENT FT1
Pt breathing equal and unlabored on cardiac monitor reading a fib 76 bpm;  at 98%. Pt awaiting for echo test; pt aware of plan of care.

## 2024-02-10 NOTE — ED ADULT NURSE REASSESSMENT NOTE - NS ED NURSE REASSESS COMMENT FT1
Pt is resting in bed comfortably at this time, no apparent distress noted at this time. pt safety maintained. Pt denies any complaints at this time. pt updated on plan of care. Pt is nsr on monitor. VSS.

## 2024-02-10 NOTE — PROGRESS NOTE ADULT - SUBJECTIVE AND OBJECTIVE BOX
[facility]                                                         CARDIOLOGY AT [place] [address]                                                         Telephone: [phone]. Fax:[phone]                                                                             PROGRESS NOTE    Chief Complaint upon presentation to hospital: sent by primary cardiologist (Dr. Doherty) for new onset afib   Initial reason for Consult: Afib RVR   Reason for follow up: Afib RVR       Review of symptoms:   Cardiac:  No chest pain. No dyspnea. No palpitations.  Respiratory: no cough. No dyspnea  Gastrointestinal: No diarrhea. No abdominal pain. No bleeding.   Neuro: No focal neuro complaints.    Vitals:  T(C): 36.8 (02-10-24 @ 10:15), Max: 37.1 (02-09-24 @ 15:14)  HR: 72 (02-10-24 @ 10:15) (54 - 97)  BP: 152/75 (02-10-24 @ 10:15) (141/71 - 161/85)  RR: 20 (02-10-24 @ 10:15) (18 - 20)  SpO2: 98% (02-10-24 @ 10:15) (94% - 98%)  Wt(kg): --  I&O's Summary    Weight (kg): 115.7 (02-09 @ 11:57)    PHYSICAL EXAM:  Appearance: Comfortable. No acute distress  HEENT:  Atraumatic. Normocephalic.  Normal oral mucosa  Neurologic: A & O x 3, no gross focal deficits.  Cardiovascular: RRR S1 S2, No murmur, no rubs/gallops. No JVD  Respiratory: Lungs clear to auscultation, unlabored   Gastrointestinal:  Soft, Non-tender, + BS  Lower Extremities: 2+ Peripheral Pulses, No clubbing, cyanosis, or edema  Psychiatry: Patient is calm. No agitation.   Skin: warm and dry.    CURRENT CARDIAC MEDICATIONS:  hydrochlorothiazide 12.5 milliGRAM(s) Oral daily  losartan 50 milliGRAM(s) Oral daily  metoprolol succinate ER 50 milliGRAM(s) Oral daily      CURRENT OTHER MEDICATIONS:  atorvastatin 40 milliGRAM(s) Oral at bedtime  apixaban 5 milliGRAM(s) Oral two times a day  aspirin enteric coated 81 milliGRAM(s) Oral daily      LABS:	 	                            14.0   7.70  )-----------( 182      ( 09 Feb 2024 13:50 )             43.1     02-09    139  |  100  |  25.9<H>  ----------------------------<  97  4.3   |  28.0  |  1.41<H>    Ca    9.3      09 Feb 2024 13:50  Mg     2.2     02-09    TPro  7.7  /  Alb  4.0  /  TBili  1.6  /  DBili  x   /  AST  27  /  ALT  24  /  AlkPhos  92  02-09    PT/INR/PTT ( 09 Feb 2024 13:50 )                       :                       :      12.3         :       27.0                  .        .                   .              .           .       1.11        .                                       Lipid Profile:   HgA1c:   TSH: Thyroid Stimulating Hormone, Serum: 1.91 uIU/mL

## 2024-02-10 NOTE — ED CDU PROVIDER SUBSEQUENT DAY NOTE - ATTENDING APP SHARED VISIT CONTRIBUTION OF CARE
I, Agustín Mesa MD, performed the initial face to face bedside interview with this patient regarding history of present illness, review of symptoms and relevant past medical, social and family history.  I completed an independent physical examination.  I was the initial provider who evaluated this patient. I have signed out the follow up of any pending tests (i.e. labs, radiological studies) to the ACP.  I have communicated the patient’s plan of care and disposition with the ACP.

## 2024-02-10 NOTE — ED ADULT NURSE REASSESSMENT NOTE - NS ED NURSE REASSESS COMMENT FT1
Pt is resting in bed comfortably at this time, no apparent distress noted at this time. pt safety maintained. Pt denies any complaints at this time. pt updated on plan of care. Pt is in afib on the cardiac monitor. VSS.

## 2024-02-10 NOTE — ED ADULT NURSE REASSESSMENT NOTE - NS ED NURSE REASSESS COMMENT FT1
Pt resting comfortably in stretcher breathing equal and unlabored; pt on cardiac monitor reading 86 bpm in a fib and  at 98% . pt awaiting for CT.

## 2024-02-10 NOTE — ED CDU PROVIDER SUBSEQUENT DAY NOTE - CLINICAL SUMMARY MEDICAL DECISION MAKING FREE TEXT BOX
72 y/o M, PMH of CAD s/p PCI with DANO to the RCA, HTN, Dyslipidemia, Lymphedema (R> L LLE) and NSVT sent in from cardiologist ( dr. benedict) office for new onset Afib RVR.  Cards consulted  pending TTE.

## 2024-02-10 NOTE — PROGRESS NOTE ADULT - NS ATTEND AMEND GEN_ALL_CORE FT
73/M, pmhx of CAD s/p PCI 1DES rca, htn, hld, svt, nsvt sent in from cardiologist ( dr. benedict) office for new onse Afib RVR. Reports some dyspnea on exertion since a week, progressively worsening these past couple of days with pedal edema.     acute dyspnea on exertion  new onset atrial fibrillation  hx of cad s/p PCI w/ DANO x 1 to RCA  hyperlipidemia  SVT  hx of NSVT      2d TTE is abnormal.  Stat CT PE has been requested  adjust apixiban to heparin infusion while investigation for PE ongoing.  we have discussed with ER and hospitalist medicine team    await CTA PE. Patient is on room air, HR is controlled and BP is appropriate.

## 2024-02-10 NOTE — ED CDU PROVIDER DISPOSITION NOTE - CLINICAL COURSE
74 y/o M with new onset a-fib - rate controlled - TTE shows right sided heart strain suspicious for PE - SS cards following, will hold off on heparin for now, pending CT angio

## 2024-02-10 NOTE — H&P ADULT - HISTORY OF PRESENT ILLNESS
73yoM w/ PMHx of CAD s/p PCI 1DES to RCA, HTN, HLD, NSVT, chronic lymphedema sent in from cardiologist (Dr. benedict) office for new onse Afib RVR. Reports some dyspnea on exertion since a week, progressively worsening these past couple of days with pedal edema so he went to cardiologist. EKG there showing a fib and instructed to come to ED.     Upon presentation to ED, HDS, no room air. Labs: CBC unremarkable, BMP sign for BUN/Cr 25.9/1.41, BNP 3168, trops 58 -> 59. Cards consulted in ED, recommending echo to determine if inpt v. out ischemic workup. Echo showing severe RV dysfunction with elevated trops, concerning for PE. Recommending CT PE study. Patient admitted to medicine for further management.  73yoM w/ PMHx of CAD s/p PCI 1DES to RCA, HTN, HLD, NSVT, chronic lymphedema sent in from cardiologist (Dr. benedict) office for new onse Afib RVR. Reports some dyspnea on exertion since a week, progressively worsening these past couple of days with pedal edema so he went to cardiologist. EKG there showing a fib and instructed to come to ED. Patient reports NO, denies PND, orthopnea. Denies CP, reports some pain radiating along L side of neck. Denies any fever, chills, CP, SOB at rest, abd pain, n/v, dysuria, contipation/diarrhea, numbness/tingling. Reports chronic LE swelling L >R, worsened slightly over last week. Also reports palpitations intermittently. Upon presentation to ED, HDS, no room air. Labs: CBC unremarkable, BMP sign for BUN/Cr 25.9/1.41, BNP 3168, trops 58 -> 59. Cards consulted in ED, recommending echo to determine if inpt v. out ischemic workup. Echo showing severe RV dysfunction with elevated trops, concerning for PE. Recommending CT PE study. Patient admitted to medicine for further management.  73yoM w/ PMHx of CAD s/p PCI 1DES to RCA, HTN, HLD, NSVT, chronic lymphedema sent in from cardiologist (Dr. benedict) office for new onse Afib RVR. Reports some dyspnea on exertion since a week, progressively worsening these past couple of days with pedal edema so he went to cardiologist. EKG there showing a fib and instructed to come to ED. Patient reports NO, denies PND, orthopnea. Denies CP, reports some pain radiating along L side of neck. Denies any fever, chills, CP, SOB at rest, abd pain, n/v, dysuria, contipation/diarrhea, numbness/tingling. Reports chronic LE swelling L >R, worsened slightly over last week. Also reports palpitations intermittently. Upon presentation to ED, HDS, no room air. Labs: CBC unremarkable, BMP sign for BUN/Cr 25.9/1.41, BNP 3168, trops 58 -> 59. Cards consulted in ED, recommending echo to determine if inpt v. out ischemic workup. Echo showing severe RV dysfunction, pulm HTN with elevated trops, concerning for PE. Recommending CT PE study. Patient admitted to medicine for further management.

## 2024-02-10 NOTE — ED ADULT NURSE REASSESSMENT NOTE - NS ED NURSE REASSESS COMMENT FT1
pt care assumed at 0730, no apparent distress noted at this time, charting as noted. Pt received A&Ox4 resting in bed comfortably at this time. Pt is nsr on monitor. Pt is breathing unlabored on room air. VSS. Pt awaiting Echo.

## 2024-02-11 LAB
ANION GAP SERPL CALC-SCNC: 12 MMOL/L — SIGNIFICANT CHANGE UP (ref 5–17)
APTT BLD: >200 SEC — CRITICAL HIGH (ref 24.5–35.6)
BUN SERPL-MCNC: 23.2 MG/DL — HIGH (ref 8–20)
CALCIUM SERPL-MCNC: 8.9 MG/DL — SIGNIFICANT CHANGE UP (ref 8.4–10.5)
CHLORIDE SERPL-SCNC: 104 MMOL/L — SIGNIFICANT CHANGE UP (ref 96–108)
CO2 SERPL-SCNC: 26 MMOL/L — SIGNIFICANT CHANGE UP (ref 22–29)
CREAT SERPL-MCNC: 1.25 MG/DL — SIGNIFICANT CHANGE UP (ref 0.5–1.3)
EGFR: 61 ML/MIN/1.73M2 — SIGNIFICANT CHANGE UP
GLUCOSE SERPL-MCNC: 97 MG/DL — SIGNIFICANT CHANGE UP (ref 70–99)
HCT VFR BLD CALC: 40.3 % — SIGNIFICANT CHANGE UP (ref 39–50)
HCT VFR BLD CALC: 41.4 % — SIGNIFICANT CHANGE UP (ref 39–50)
HCV AB S/CO SERPL IA: 0.09 S/CO — SIGNIFICANT CHANGE UP (ref 0–0.99)
HCV AB SERPL-IMP: SIGNIFICANT CHANGE UP
HGB BLD-MCNC: 13.1 G/DL — SIGNIFICANT CHANGE UP (ref 13–17)
HGB BLD-MCNC: 14 G/DL — SIGNIFICANT CHANGE UP (ref 13–17)
MAGNESIUM SERPL-MCNC: 2 MG/DL — SIGNIFICANT CHANGE UP (ref 1.8–2.6)
MCHC RBC-ENTMCNC: 31.3 PG — SIGNIFICANT CHANGE UP (ref 27–34)
MCHC RBC-ENTMCNC: 32.5 GM/DL — SIGNIFICANT CHANGE UP (ref 32–36)
MCHC RBC-ENTMCNC: 32.5 PG — SIGNIFICANT CHANGE UP (ref 27–34)
MCHC RBC-ENTMCNC: 33.8 GM/DL — SIGNIFICANT CHANGE UP (ref 32–36)
MCV RBC AUTO: 96.1 FL — SIGNIFICANT CHANGE UP (ref 80–100)
MCV RBC AUTO: 96.4 FL — SIGNIFICANT CHANGE UP (ref 80–100)
PLATELET # BLD AUTO: 190 K/UL — SIGNIFICANT CHANGE UP (ref 150–400)
PLATELET # BLD AUTO: 191 K/UL — SIGNIFICANT CHANGE UP (ref 150–400)
POTASSIUM SERPL-MCNC: 4.2 MMOL/L — SIGNIFICANT CHANGE UP (ref 3.5–5.3)
POTASSIUM SERPL-SCNC: 4.2 MMOL/L — SIGNIFICANT CHANGE UP (ref 3.5–5.3)
RBC # BLD: 4.18 M/UL — LOW (ref 4.2–5.8)
RBC # BLD: 4.31 M/UL — SIGNIFICANT CHANGE UP (ref 4.2–5.8)
RBC # FLD: 13 % — SIGNIFICANT CHANGE UP (ref 10.3–14.5)
RBC # FLD: 13.1 % — SIGNIFICANT CHANGE UP (ref 10.3–14.5)
SODIUM SERPL-SCNC: 142 MMOL/L — SIGNIFICANT CHANGE UP (ref 135–145)
WBC # BLD: 10.23 K/UL — SIGNIFICANT CHANGE UP (ref 3.8–10.5)
WBC # BLD: 8.1 K/UL — SIGNIFICANT CHANGE UP (ref 3.8–10.5)
WBC # FLD AUTO: 10.23 K/UL — SIGNIFICANT CHANGE UP (ref 3.8–10.5)
WBC # FLD AUTO: 8.1 K/UL — SIGNIFICANT CHANGE UP (ref 3.8–10.5)

## 2024-02-11 PROCEDURE — 99233 SBSQ HOSP IP/OBS HIGH 50: CPT

## 2024-02-11 RX ORDER — HEPARIN SODIUM 5000 [USP'U]/ML
INJECTION INTRAVENOUS; SUBCUTANEOUS
Qty: 25000 | Refills: 0 | Status: DISCONTINUED | OUTPATIENT
Start: 2024-02-11 | End: 2024-02-12

## 2024-02-11 RX ORDER — HEPARIN SODIUM 5000 [USP'U]/ML
4500 INJECTION INTRAVENOUS; SUBCUTANEOUS EVERY 6 HOURS
Refills: 0 | Status: DISCONTINUED | OUTPATIENT
Start: 2024-02-11 | End: 2024-02-12

## 2024-02-11 RX ORDER — HEPARIN SODIUM 5000 [USP'U]/ML
9500 INJECTION INTRAVENOUS; SUBCUTANEOUS EVERY 6 HOURS
Refills: 0 | Status: DISCONTINUED | OUTPATIENT
Start: 2024-02-11 | End: 2024-02-12

## 2024-02-11 RX ADMIN — Medication 81 MILLIGRAM(S): at 11:02

## 2024-02-11 RX ADMIN — HEPARIN SODIUM 2100 UNIT(S)/HR: 5000 INJECTION INTRAVENOUS; SUBCUTANEOUS at 19:03

## 2024-02-11 RX ADMIN — ATORVASTATIN CALCIUM 40 MILLIGRAM(S): 80 TABLET, FILM COATED ORAL at 22:12

## 2024-02-11 RX ADMIN — APIXABAN 5 MILLIGRAM(S): 2.5 TABLET, FILM COATED ORAL at 05:26

## 2024-02-11 RX ADMIN — HEPARIN SODIUM 0 UNIT(S)/HR: 5000 INJECTION INTRAVENOUS; SUBCUTANEOUS at 23:16

## 2024-02-11 RX ADMIN — LOSARTAN POTASSIUM 50 MILLIGRAM(S): 100 TABLET, FILM COATED ORAL at 05:26

## 2024-02-11 RX ADMIN — Medication 650 MILLIGRAM(S): at 18:19

## 2024-02-11 RX ADMIN — HEPARIN SODIUM 2100 UNIT(S)/HR: 5000 INJECTION INTRAVENOUS; SUBCUTANEOUS at 14:25

## 2024-02-11 RX ADMIN — Medication 50 MILLIGRAM(S): at 05:26

## 2024-02-11 NOTE — PROGRESS NOTE ADULT - SUBJECTIVE AND OBJECTIVE BOX
[facility]                                                         CARDIOLOGY AT [address], [place] [address]                                                         Telephone: [phone]. Fax:[phone]                                                                             PROGRESS NOTE    Chief Complaint upon presentation to hospital: sent by primary cardiologist (Dr. Doherty) for new onset afib   Initial reason for Consult: Afib RVR   Reason for follow up: Severe RV Failure, unknown etiology  Overall plan: RHC w/ vasoreactivity study in AM, keep NPO @ MN. Stop eliquis and transition to heparin infusion.     Review of symptoms:   Cardiac:  No chest pain. No dyspnea. No palpitations.  Respiratory: no cough. No dyspnea  Gastrointestinal: No diarrhea. No abdominal pain. No bleeding.   Neuro: No focal neuro complaints.    Vitals:  T(C): 37.2 (02-11-24 @ 08:00), Max: 37.7 (02-10-24 @ 20:56)  HR: 66 (02-11-24 @ 08:00) (61 - 92)  BP: 143/70 (02-11-24 @ 08:00) (119/66 - 156/81)  RR: 18 (02-11-24 @ 08:00) (18 - 20)  SpO2: 98% (02-11-24 @ 08:00) (91% - 98%)  Wt(kg): --  I&O's Summary    Weight (kg): 115.7 (02-09 @ 11:57)    PHYSICAL EXAM:  Appearance: Comfortable. No acute distress  HEENT:  Atraumatic. Normocephalic.  Normal oral mucosa  Neurologic: A & O x 3, no gross focal deficits.  Cardiovascular: RRR S1 S2, No murmur, no rubs/gallops. No JVD  Respiratory: Lungs clear to auscultation, unlabored   Gastrointestinal:  Soft, Non-tender, + BS  Lower Extremities: 2+ Peripheral Pulses, No clubbing, cyanosis, or edema  Psychiatry: Patient is calm. No agitation.   Skin: warm and dry.    CURRENT CARDIAC MEDICATIONS:  hydrochlorothiazide 12.5 milliGRAM(s) Oral daily  losartan 50 milliGRAM(s) Oral daily  metoprolol succinate ER 50 milliGRAM(s) Oral daily      CURRENT OTHER MEDICATIONS:  acetaminophen     Tablet .. 650 milliGRAM(s) Oral every 6 hours PRN Temp greater or equal to 38C (100.4F), Mild Pain (1 - 3)  melatonin 3 milliGRAM(s) Oral at bedtime PRN Insomnia  ondansetron Injectable 4 milliGRAM(s) IV Push every 8 hours PRN Nausea and/or Vomiting  aluminum hydroxide/magnesium hydroxide/simethicone Suspension 30 milliLiter(s) Oral every 4 hours PRN Dyspepsia  atorvastatin 40 milliGRAM(s) Oral at bedtime  apixaban 5 milliGRAM(s) Oral two times a day  aspirin enteric coated 81 milliGRAM(s) Oral daily      LABS:	 	                            13.1   8.10  )-----------( 190      ( 11 Feb 2024 05:10 )             40.3     02-11    142  |  104  |  23.2<H>  ----------------------------<  97  4.2   |  26.0  |  1.25    Ca    8.9      11 Feb 2024 05:10  Mg     2.0     02-11    TPro  7.7  /  Alb  4.0  /  TBili  1.6  /  DBili  x   /  AST  27  /  ALT  24  /  AlkPhos  92  02-09    PT/INR/PTT ( 09 Feb 2024 13:50 )                       :                       :      12.3         :       27.0                  .        .                   .              .           .       1.11        .                                       Lipid Profile:   HgA1c:   TSH: Thyroid Stimulating Hormone, Serum: 1.91 uIU/mL

## 2024-02-11 NOTE — PROGRESS NOTE ADULT - NS ATTEND AMEND GEN_ALL_CORE FT
73/M, pmhx of CAD s/p PCI 1DES rca, htn, hld, svt, nsvt sent in from cardiologist ( dr. benedict) office for new onse Afib RVR. Reports some dyspnea on exertion since a week, progressively worsening these past couple of days with pedal edema.       acute dyspnea on exertion  sever pht, unspecified type  new onset atrial fibrillation  hx of cad s/p PCI w/ DANO x 1 to RCA  hyperlipidemia  SVT  hx of NSVT      2d TTE is abnormal.  Stat CT PE reviewed, no PE  heparin infusion while investigation for PHT ongoing    recommend pulmonary evaluation, PFTs, sleep apnea testing  cardiac catheterization    Patient is on room air, HR is controlled and BP is appropriate.

## 2024-02-11 NOTE — PROGRESS NOTE ADULT - SUBJECTIVE AND OBJECTIVE BOX
TAMRA PERKINS  ----------------------------------------  The patient was seen earlier at bedside. Patient with atrial fibrillation. Offered no complaints.    Vital Signs Last 24 Hrs  T(C): 37.1 (11 Feb 2024 12:04), Max: 37.7 (10 Feb 2024 20:56)  T(F): 98.8 (11 Feb 2024 12:04), Max: 99.9 (10 Feb 2024 20:56)  HR: 59 (11 Feb 2024 12:04) (59 - 92)  BP: 115/58 (11 Feb 2024 12:04) (115/58 - 156/81)  BP(mean): --  RR: 17 (11 Feb 2024 12:04) (17 - 20)  SpO2: 98% (11 Feb 2024 12:04) (91% - 98%)    Parameters below as of 11 Feb 2024 12:04  Patient On (Oxygen Delivery Method): room air    PHYSICAL EXAMINATION:  ----------------------------------------  General appearance: No acute distress, Awake, Alert  HEENT: Normocephalic, Atraumatic, Conjunctiva clear, EOMI  Neck: Supple, No JVD, No tenderness  Lungs: Breath sound equal bilaterally, No wheezes, No rales  Cardiovascular: S1S2, Regular rhythm  Abdomen: Soft, Nontender, Nondistended, No guarding/rebound, Positive bowel sounds  Extremities: No clubbing, No cyanosis, No calf tenderness, Lower extremity edema  Neuro: Strength equal bilaterally, No tremors  Psychiatric: Appropriate mood, Normal affect    LABORATORY STUDIES:  ----------------------------------------             13.1   8.10  )-----------( 190      ( 11 Feb 2024 05:10 )             40.3     02-11    142  |  104  |  23.2<H>  ----------------------------<  97  4.2   |  26.0  |  1.25    Ca    8.9      11 Feb 2024 05:10  Mg     2.0     02-11    Urinalysis Basic - ( 11 Feb 2024 05:10 )  Color: x / Appearance: x / SG: x / pH: x  Gluc: 97 mg/dL / Ketone: x  / Bili: x / Urobili: x   Blood: x / Protein: x / Nitrite: x   Leuk Esterase: x / RBC: x / WBC x   Sq Epi: x / Non Sq Epi: x / Bacteria: x    MEDICATIONS  (STANDING):  aspirin enteric coated 81 milliGRAM(s) Oral daily  atorvastatin 40 milliGRAM(s) Oral at bedtime  heparin  Infusion.  Unit(s)/Hr (21 mL/Hr) IV Continuous <Continuous>  hydrochlorothiazide 12.5 milliGRAM(s) Oral daily  losartan 50 milliGRAM(s) Oral daily  metoprolol succinate ER 50 milliGRAM(s) Oral daily    MEDICATIONS  (PRN):  acetaminophen     Tablet .. 650 milliGRAM(s) Oral every 6 hours PRN Temp greater or equal to 38C (100.4F), Mild Pain (1 - 3)  aluminum hydroxide/magnesium hydroxide/simethicone Suspension 30 milliLiter(s) Oral every 4 hours PRN Dyspepsia  heparin   Injectable 4500 Unit(s) IV Push every 6 hours PRN For aPTT between 40 - 57  heparin   Injectable 9500 Unit(s) IV Push every 6 hours PRN For aPTT less than 40  melatonin 3 milliGRAM(s) Oral at bedtime PRN Insomnia  ondansetron Injectable 4 milliGRAM(s) IV Push every 8 hours PRN Nausea and/or Vomiting      ASSESSMENT / PLAN:  ----------------------------------------  73M with a history of coronary artery disease, hypertension, hyperlipidemia, NSVT, and lymphedema who was referred to the hospital with findings of atrial fibrillation. Echocardiogram was notable for right heart strain but CT angiogram was without pulmonary embolism.    Atrial fibrillation  - On metoprolol  - Heparin for anticoagulation  - For resumption of apixaban pending results for cardiac catheterization    Pulmonary hypertension  - Noted on echocardiogram  - CT angiogram was without pulmonary embolism  - For cardiac catheterization tomorrow    Coronary artery disease  - On aspirin, atorvastatin, and metoprolol  - Further ischemic evaluation per Cardiology    Chronic kidney disease  - Monitoring renal function  - Continue on losartan for now    Hypertension  - Close blood pressure monitoring  - On hydrochlorothiazide and losartan

## 2024-02-12 DIAGNOSIS — I25.10 ATHEROSCLEROTIC HEART DISEASE OF NATIVE CORONARY ARTERY WITHOUT ANGINA PECTORIS: ICD-10-CM

## 2024-02-12 DIAGNOSIS — I27.20 PULMONARY HYPERTENSION, UNSPECIFIED: ICD-10-CM

## 2024-02-12 LAB
APTT BLD: 104.8 SEC — HIGH (ref 24.5–35.6)
APTT BLD: 97.5 SEC — HIGH (ref 24.5–35.6)
HCT VFR BLD CALC: 40.4 % — SIGNIFICANT CHANGE UP (ref 39–50)
HGB BLD-MCNC: 13.6 G/DL — SIGNIFICANT CHANGE UP (ref 13–17)
MCHC RBC-ENTMCNC: 32.7 PG — SIGNIFICANT CHANGE UP (ref 27–34)
MCHC RBC-ENTMCNC: 33.7 GM/DL — SIGNIFICANT CHANGE UP (ref 32–36)
MCV RBC AUTO: 97.1 FL — SIGNIFICANT CHANGE UP (ref 80–100)
PLATELET # BLD AUTO: 182 K/UL — SIGNIFICANT CHANGE UP (ref 150–400)
RBC # BLD: 4.16 M/UL — LOW (ref 4.2–5.8)
RBC # FLD: 13.2 % — SIGNIFICANT CHANGE UP (ref 10.3–14.5)
WBC # BLD: 9.75 K/UL — SIGNIFICANT CHANGE UP (ref 3.8–10.5)
WBC # FLD AUTO: 9.75 K/UL — SIGNIFICANT CHANGE UP (ref 3.8–10.5)

## 2024-02-12 PROCEDURE — 99233 SBSQ HOSP IP/OBS HIGH 50: CPT

## 2024-02-12 PROCEDURE — 93010 ELECTROCARDIOGRAM REPORT: CPT

## 2024-02-12 PROCEDURE — 99223 1ST HOSP IP/OBS HIGH 75: CPT

## 2024-02-12 PROCEDURE — 93460 R&L HRT ART/VENTRICLE ANGIO: CPT | Mod: 26,59

## 2024-02-12 PROCEDURE — 93925 LOWER EXTREMITY STUDY: CPT | Mod: 26

## 2024-02-12 PROCEDURE — 92928 PRQ TCAT PLMT NTRAC ST 1 LES: CPT | Mod: 26,LD

## 2024-02-12 PROCEDURE — 99152 MOD SED SAME PHYS/QHP 5/>YRS: CPT

## 2024-02-12 RX ORDER — APIXABAN 2.5 MG/1
5 TABLET, FILM COATED ORAL
Refills: 0 | Status: DISCONTINUED | OUTPATIENT
Start: 2024-02-13 | End: 2024-02-18

## 2024-02-12 RX ORDER — CLOPIDOGREL BISULFATE 75 MG/1
600 TABLET, FILM COATED ORAL ONCE
Refills: 0 | Status: COMPLETED | OUTPATIENT
Start: 2024-02-12 | End: 2024-02-12

## 2024-02-12 RX ORDER — CLOPIDOGREL BISULFATE 75 MG/1
75 TABLET, FILM COATED ORAL DAILY
Refills: 0 | Status: DISCONTINUED | OUTPATIENT
Start: 2024-02-13 | End: 2024-02-18

## 2024-02-12 RX ORDER — FUROSEMIDE 40 MG
40 TABLET ORAL DAILY
Refills: 0 | Status: DISCONTINUED | OUTPATIENT
Start: 2024-02-13 | End: 2024-02-13

## 2024-02-12 RX ORDER — TICAGRELOR 90 MG/1
90 TABLET ORAL
Refills: 0 | Status: DISCONTINUED | OUTPATIENT
Start: 2024-02-12 | End: 2024-02-12

## 2024-02-12 RX ORDER — FUROSEMIDE 40 MG
40 TABLET ORAL ONCE
Refills: 0 | Status: COMPLETED | OUTPATIENT
Start: 2024-02-12 | End: 2024-02-12

## 2024-02-12 RX ADMIN — LOSARTAN POTASSIUM 50 MILLIGRAM(S): 100 TABLET, FILM COATED ORAL at 05:21

## 2024-02-12 RX ADMIN — Medication 40 MILLIGRAM(S): at 12:39

## 2024-02-12 RX ADMIN — HEPARIN SODIUM 1700 UNIT(S)/HR: 5000 INJECTION INTRAVENOUS; SUBCUTANEOUS at 07:25

## 2024-02-12 RX ADMIN — HEPARIN SODIUM 1700 UNIT(S)/HR: 5000 INJECTION INTRAVENOUS; SUBCUTANEOUS at 06:49

## 2024-02-12 RX ADMIN — Medication 81 MILLIGRAM(S): at 08:30

## 2024-02-12 RX ADMIN — ATORVASTATIN CALCIUM 40 MILLIGRAM(S): 80 TABLET, FILM COATED ORAL at 22:13

## 2024-02-12 RX ADMIN — HEPARIN SODIUM 1700 UNIT(S)/HR: 5000 INJECTION INTRAVENOUS; SUBCUTANEOUS at 00:25

## 2024-02-12 RX ADMIN — Medication 50 MILLIGRAM(S): at 05:21

## 2024-02-12 RX ADMIN — CLOPIDOGREL BISULFATE 600 MILLIGRAM(S): 75 TABLET, FILM COATED ORAL at 18:43

## 2024-02-12 NOTE — PROGRESS NOTE ADULT - NS ATTEND AMEND GEN_ALL_CORE FT
74 y/o M with new AF with RVR, severe PH with PASP 78 mmHg, AF with RVR. CTA negative for PE. S/p LHC/RHC with DANO to pLAD and elevated PCWP per prelim report (full report pending). Continue DAPT, statin, beta blocker. On diuresis with lasix.

## 2024-02-12 NOTE — PROGRESS NOTE ADULT - SUBJECTIVE AND OBJECTIVE BOX
Department of Cardiology                                                                  [facility]/[place] [address]                                                            Telephone: [phone]. Fax:[phone]                                                                             Cardiology PRE/POST CATH Progress Note      HPI: 73M with a history of coronary artery disease, hypertension, hyperlipidemia, NSVT, and lymphedema who was referred to the hospital with findings of atrial fibrillation. Echocardiogram was notable for right heart strain but CT angiogram was without pulmonary embolism.  POST LHC AND RHC  LHC DANO to the Prox LAD 2.75 x 22       PRECATH INDICATIONS:                                                                                                          Symptoms:                                                                                                               Angina class:                                                                                                                                                   Received in holding room  NAD and HDS.      POST LHC RESULTS  NO COMPLICATIONS DURING CATHETERIZATION  RECIEVED IN HR NAD, HDS.                                                                                                                                        CARDIOLOGY SUMMARY  Echo Date:   Prior Cardiac Interventions:  Stress Test: Date:   CTA    Risk Assessments:  ASA:  Mallampati:  Bleeding Risk:  Creatinine:  GFR:    Associated Risk Factors:        Frailty Assessment (none/mild/mod/severe)       Cerebrovascular Disease: N/A       Chronic Lung Disease: N/A       Peripheral Arterial Disease: N/A       Chronic Kidney Disease (if yes, what is GFR): N/A       Uncontrolled Diabetes (if yes, what is HgbA1C or FBS): N/A       Poorly Controlled Hypertension (if yes, what is SBP): N/A       Morbid Obesity (if yes, what is BMI): N/A       History of Recent Ventricular Arrhythmia: N/A       Inability to Ambulate Safely: N/A       Need for Therapeutic Anticoagulation: N/A       Antiplatelet or Contrast Allergy: N/A    Antianginal Therapies:        Beta Blockers:         Calcium Channel Blockers:        Long Acting Nitrates:        Ranexa:     MEDICATIONS:  furosemide   Injectable 40 milliGRAM(s) IV Push once  hydrochlorothiazide 12.5 milliGRAM(s) Oral daily  losartan 50 milliGRAM(s) Oral daily  metoprolol succinate ER 50 milliGRAM(s) Oral daily        acetaminophen     Tablet .. 650 milliGRAM(s) Oral every 6 hours PRN  melatonin 3 milliGRAM(s) Oral at bedtime PRN  ondansetron Injectable 4 milliGRAM(s) IV Push every 8 hours PRN    aluminum hydroxide/magnesium hydroxide/simethicone Suspension 30 milliLiter(s) Oral every 4 hours PRN    atorvastatin 40 milliGRAM(s) Oral at bedtime    aspirin enteric coated 81 milliGRAM(s) Oral daily  heparin   Injectable 4500 Unit(s) IV Push every 6 hours PRN  heparin   Injectable 9500 Unit(s) IV Push every 6 hours PRN  heparin  Infusion.  Unit(s)/Hr IV Continuous <Continuous>  ticagrelor 90 milliGRAM(s) Oral two times a day        ROS: as stated above, otherwise negative    PHYSICAL EXAM:  Constitutional: A & O x 3  HEENT:   Normal oral mucosa, PERRL, EOMI	  Cardiovascular: Normal S1 S2, No JVD, No murmurs  Respiratory: Lungs clear to auscultation	  Gastrointestinal:  Soft, Non-tender, + BS	  Skin: No rashes, No ecchymoses, No cyanosis  Neurologic: Non-focal  Extremities: Normal range of motion, no edema  Vascular access site  Peripheral pulses palpable + bilaterally    T(C): 36.6 (02-12-24 @ 09:01), Max: 37.8 (02-12-24 @ 04:30)  HR: 80 (02-12-24 @ 12:25) (62 - 90)  BP: 121/65 (02-12-24 @ 12:25) (108/64 - 152/73)  RR: 16 (02-12-24 @ 12:25) (13 - 18)  SpO2: 93% (02-12-24 @ 12:25) (91% - 98%)  Wt(kg): --  I&O's Summary    11 Feb 2024 07:01  -  12 Feb 2024 07:00  --------------------------------------------------------  IN: 443 mL / OUT: 400 mL / NET: 43 mL      Daily     Daily   TELEMETRY: 	    ECG:  	  LABS:	 	                                13.6   9.75  )-----------( 182      ( 12 Feb 2024 04:55 )             40.4     02-11    142  |  104  |  23.2<H>  ----------------------------<  97  4.2   |  26.0  |  1.25    Ca    8.9      11 Feb 2024 05:10  Mg     2.0     02-11      Lipid Profile:   HgA1c:   proBNP:       A/P:      #Pre cath  -plan for LHC via RA vs FA  -patient seen and examined  -confirmed appropriate NPO duration  -ECG and Labs reviewed  -Aspirin 81mg po pre-cath  -NS 250mL IV bolus pre-cath   -procedure discussed with patient; risks and benefits explained, questions answered  -consent obtained by attending IC    #s/p LHC/** on ** with ** via **  -Cont DAPT  -Cont BB/ACE-I/Statin**  -Cr stable post contrast administration day **  -RF management to prevent ISR/progression of CAD**  -monitor ** access site and **pulse  per protocol  -bedrest for   -remove radial band/sheath   - cardiac rehab info provided / referral and communication to cardiac rehab competed     #CAD   -Cont DAPT with ASA 81mg and Plavix 75mg po daily  -Cont BB**mg po daily  -Cont high dose statin with ***mg po qHS  -ECG and Labs reviewed    #Chest Pain syndrome with ***  -Cont ASA 81mg po daily  -Hold Lovenox this morning for LHC  -Cont/start BB with ***  -Cont statin with ***mg po qHS  -ECG and Labs reviewed  -plan for LHC via RA vs FA  -patient seen and examined  -confirmed appropriate NPO duration  -procedure discussed with patient; risks and benefits explained, questions answered  -consent obtained by attending IC    #HTN  -Cont ***  -Low Na diet    #HLD  -Cont/start ***  -Low fat/cholesterol diet  -Routine Lipid panels to ensure at goal     #DM  -F/S ac and HS  -Cont correction scale  -Cont ***  -Diabetic/carb limited diet  -Routine HbA1C levels to assess glucose control    #CKD stage ***  -montior daily BUN/Cr and GFR  -Monitor I and O  -Avoid nephrotoxic agents where possible    #ADHF/CM with EF ***  -Cont BB ***mg po daily  -Cont diuretic **mg po daily  -*** Entresto/***ACE-I/ARB   -daily routine labs  -supplement electrolytes as indicated  -Strict I + O  -daily weight  -check O2 sats per protocol     #A Fib with RVR now intermittent and rate controlled  -Cont rate control with *** po daily  -Cont A/C with ***  -monitor PTT/INR ***  -Continous tele monitoring  -Repeat ECG if change in rhythm  -EP consult if indicated    #Valvular HD   -Cont diuretic***mg po daily  -Repeat TTE to re-eval LVEF and severity of valvular disease when appropriate post PCI and optimization    #Acute hypoxic respiratory failure with leukocytosis in the setting of chronic COPD, PF and newly dx LV systolic dysfunction   -monitor pulm status and cont O2 sat  -monitor WBCs and temps  -Cont O2 N/C  -Cont IV steroids  -Cont bronchodilators  -Continue A/B course  -Pulm consulting    #PPX  -Continue Lovenox for DVT ppx    #Full Code    #Dispo                                                                                 Department of Cardiology                                                                  [facility]/[place] [address] [address] [facility]                                                            Telephone: [phone]. Fax:[phone]                                                                             Cardiology PRE/POST CATH Progress Note      HPI: 73M with a history of coronary artery disease, hypertension, hyperlipidemia, NSVT, and lymphedema who was referred to the hospital with findings of atrial fibrillation. Echocardiogram was notable for right heart strain but CT angiogram was without pulmonary embolism.    POST LHC AND RHC  LHC DANO to the Prox LAD 2.75 x 22   RHC W-25  PA 70mean 44  PVR 3.76  PASP 50  C.O 4.7   Received in holding room  NAD and HDS.      NO COMPLICATIONS DURING CATHETERIZATION  RECIEVED IN HR NAD, HDS.                                                                                                                                        MEDICATIONS:  furosemide   Injectable 40 milliGRAM(s) IV Push once  hydrochlorothiazide 12.5 milliGRAM(s) Oral daily  losartan 50 milliGRAM(s) Oral daily  metoprolol succinate ER 50 milliGRAM(s) Oral daily  acetaminophen     Tablet .. 650 milliGRAM(s) Oral every 6 hours PRN  melatonin 3 milliGRAM(s) Oral at bedtime PRN  ondansetron Injectable 4 milliGRAM(s) IV Push every 8 hours PRN  aluminumhydroxide/magnesium hydroxide/simethicone Suspension 30 milliLiter(s) Oral every 4 hours PRN  atorvastatin 40 milliGRAM(s) Oral at bedtime  aspirin enteric coated 81 milliGRAM(s) Oral daily  heparin   Injectable 4500 Unit(s) IV Push every 6 hours PRN  heparin   Injectable 9500 Unit(s) IV Push every 6 hours PRN  heparin  Infusion.  Unit(s)/Hr IV Continuous <Continuous>  ticagrelor 90 milliGRAM(s) Oral two times a day      ROS: as stated above, otherwise negative    PHYSICAL EXAM:  Constitutional: A & O x 3  HEENT:   Normal oral mucosa, PERRL, EOMI	  Cardiovascular: Normal S1 S2, No JVD, No murmurs  Respiratory: Lungs clear to auscultation	  Gastrointestinal:  Soft, Non-tender, + BS	  Skin: No rashes, No ecchymoses, No cyanosis  Neurologic: Non-focal  Extremities: Normal range of motion, no edema  Vascular access site  RRA and RBV site benign.    T(C): 36.6 (02-12-24 @ 09:01), Max: 37.8 (02-12-24 @ 04:30)  HR: 80 (02-12-24 @ 12:25) (62 - 90)  BP: 121/65 (02-12-24 @ 12:25) (108/64 - 152/73)  RR: 16 (02-12-24 @ 12:25) (13 - 18)  SpO2: 93% (02-12-24 @ 12:25) (91% - 98%)                              13.6   9.75  )-----------( 182      ( 12 Feb 2024 04:55 )             40.4     02-11    142  |  104  |  23.2<H>  ----------------------------<  97  4.2   |  26.0  |  1.25    Ca    8.9      11 Feb 2024 05:10  Mg     2.0     02-11             A/P:  72 yo male post LHC and RHC. DANO to P LAD 2.75 x 22 and elevated RHP .       #s/p LHC/CAD  -Cont DAPT plavix and baby aspirin   -Cont metoprolol, cozaar, lipitor .   -Add LAsix 40mg po daily monitor K   -Cr stable post contrast administration day **  -RF management to prevent ISR/progression of CAD**  -monitor RRA RBV  access site and **pulse  per protocol  -bedrest for 1 hour   - cardiac rehab info provided / referral and communication to cardiac rehab competed   - No post hydration due to elevated RHP     #A Fib with RVR now rate controlled  -Cont rate control with BB po daily  -Cont A/C with  eliquis po bid  Continuos tele monitoring  -Repeat ECG if change in rhythm  -EP consult if indicated                                                                                 Department of Cardiology                                                                  [facility]/[place] [address] [address] [place] [address]                                                            Telephone: [phone]. Fax:[phone]                                                                             Cardiology POST CATH Progress Note      HPI: 73M with a history of coronary artery disease, hypertension, hyperlipidemia, NSVT, and lymphedema who was referred to the hospital with findings of atrial fibrillation. Echocardiogram was notable for right heart strain but CT angiogram was without pulmonary embolism. Now Post LHC and RHC .     POST LHC AND RHC  LHC DANO to the Prox LAD 2.75 x 22   RHC W-25  PA 70mean 44  PVR 3.76  PASP 50  C.O 4.7    Received in holding room  NAD and HDS.      NO COMPLICATIONS DURING CATHETERIZATION  RECIEVED IN HR NAD, HDS.                                                                                                                                        MEDICATIONS:  furosemide   Injectable 40 milliGRAM(s) IV Push once  hydrochlorothiazide 12.5 milliGRAM(s) Oral daily  losartan 50 milliGRAM(s) Oral daily  metoprolol succinate ER 50 milliGRAM(s) Oral daily  acetaminophen     Tablet .. 650 milliGRAM(s) Oral every 6 hours PRN  melatonin 3 milliGRAM(s) Oral at bedtime PRN  ondansetron Injectable 4 milliGRAM(s) IV Push every 8 hours PRN  aluminumhydroxide/magnesium hydroxide/simethicone Suspension 30 milliLiter(s) Oral every 4 hours PRN  atorvastatin 40 milliGRAM(s) Oral at bedtime  aspirin enteric coated 81 milliGRAM(s) Oral daily  heparin   Injectable 4500 Unit(s) IV Push every 6 hours PRN  heparin   Injectable 9500 Unit(s) IV Push every 6 hours PRN  heparin  Infusion.  Unit(s)/Hr IV Continuous <Continuous>  ticagrelor 90 milliGRAM(s) Oral two times a day      ROS: as stated above, otherwise negative    PHYSICAL EXAM:  Constitutional: A & O x 3  HEENT:   Normal oral mucosa, PERRL, EOMI	  Cardiovascular: Normal S1 S2, No JVD, No murmurs  Respiratory: Lungs clear to auscultation	  Gastrointestinal:  Soft, Non-tender, + BS	  Skin: No rashes, No ecchymoses, No cyanosis  Neurologic: Non-focal  Extremities: Normal range of motion, no edema  Vascular access site  RRA and RBV site benign.    T(C): 36.6 (02-12-24 @ 09:01), Max: 37.8 (02-12-24 @ 04:30)  HR: 80 (02-12-24 @ 12:25) (62 - 90)  BP: 121/65 (02-12-24 @ 12:25) (108/64 - 152/73)  RR: 16 (02-12-24 @ 12:25) (13 - 18)  SpO2: 93% (02-12-24 @ 12:25) (91% - 98%)                              13.6   9.75  )-----------( 182      ( 12 Feb 2024 04:55 )             40.4     02-11    142  |  104  |  23.2<H>  ----------------------------<  97  4.2   |  26.0  |  1.25    Ca    8.9      11 Feb 2024 05:10  Mg     2.0     02-11             A/P:  74 yo male post LHC and RHC. DANO to P LAD 2.75 x 22 and elevated RHP .       #s/p LHC/CAD  -Cont DAPT plavix and baby aspirin   -Cont metoprolol, cozaar, lipitor .   -Add LAsix 40mg po daily monitor K   -Cr stable post contrast administration day **  -RF management to prevent ISR/progression of CAD**  -monitor RRA RBV  access site and **pulse  per protocol  -bedrest for 1 hour   - cardiac rehab info provided / referral and communication to cardiac rehab competed   - No post hydration due to elevated RHP     #A Fib with RVR now rate controlled  -Cont rate control with BB po daily  -Cont A/C with  eliquis po bid  Continuos tele monitoring  -Repeat ECG if change in rhythm  -EP consult if indicated                                                                                 Department of Cardiology                                                                  [facility]/[place] [address] [address] [place] [address]                                                            Telephone: [phone]. Fax:[phone]                                                                             Cardiology POST CATH Progress Note      HPI: 73M with a history of coronary artery disease, hypertension, hyperlipidemia, NSVT, and lymphedema who was referred to the hospital with findings of atrial fibrillation. Echocardiogram was notable for right heart strain but CT angiogram was without pulmonary embolism. Now Post LHC and RHC .     POST LHC AND RHC  LHC DANO to the Prox LAD 2.75 x 22   RHC W-25  PA 70mean 44  PVR 3.76  PASP 50  C.O 4.7        Received in holding room  NAD and HDS.      NO COMPLICATIONS DURING CATHETERIZATION  RECIEVED IN HR NAD, HDS.                                                                                                                                        MEDICATIONS:  furosemide   Injectable 40 milliGRAM(s) IV Push once  hydrochlorothiazide 12.5 milliGRAM(s) Oral daily  losartan 50 milliGRAM(s) Oral daily  metoprolol succinate ER 50 milliGRAM(s) Oral daily  acetaminophen     Tablet .. 650 milliGRAM(s) Oral every 6 hours PRN  melatonin 3 milliGRAM(s) Oral at bedtime PRN  ondansetron Injectable 4 milliGRAM(s) IV Push every 8 hours PRN  aluminumhydroxide/magnesium hydroxide/simethicone Suspension 30 milliLiter(s) Oral every 4 hours PRN  atorvastatin 40 milliGRAM(s) Oral at bedtime  aspirin enteric coated 81 milliGRAM(s) Oral daily  heparin   Injectable 4500 Unit(s) IV Push every 6 hours PRN  heparin   Injectable 9500 Unit(s) IV Push every 6 hours PRN  heparin  Infusion.  Unit(s)/Hr IV Continuous <Continuous>  ticagrelor 90 milliGRAM(s) Oral two times a day      ROS: as stated above, otherwise negative    PHYSICAL EXAM:  Constitutional: A & O x 3  HEENT:   Normal oral mucosa, PERRL, EOMI	  Cardiovascular: Normal S1 S2, No JVD, No murmurs  Respiratory: Lungs clear to auscultation	  Gastrointestinal:  Soft, Non-tender, + BS	  Skin: No rashes, No ecchymoses, No cyanosis  Neurologic: Non-focal  Extremities: Normal range of motion, no edema  Vascular access site  RRA and RBV site benign.    T(C): 36.6 (02-12-24 @ 09:01), Max: 37.8 (02-12-24 @ 04:30)  HR: 80 (02-12-24 @ 12:25) (62 - 90)  BP: 121/65 (02-12-24 @ 12:25) (108/64 - 152/73)  RR: 16 (02-12-24 @ 12:25) (13 - 18)  SpO2: 93% (02-12-24 @ 12:25) (91% - 98%)         Post EKG Afib no change from previous                      13.6   9.75  )-----------( 182      ( 12 Feb 2024 04:55 )             40.4     02-11    142  |  104  |  23.2<H>  ----------------------------<  97  4.2   |  26.0  |  1.25    Ca    8.9      11 Feb 2024 05:10  Mg     2.0     02-11             A/P:  72 yo male post LHC and RHC. DANO to P LAD 2.75 x 22 and elevated RHP .       #s/p LHC/CAD  -Cont DAPT plavix and baby aspirin   -Cont metoprolol, cozaar, lipitor .   -Add LAsix 40mg po daily monitor K   -Cr stable post contrast administration day **  -RF management to prevent ISR/progression of CAD**  -monitor RRA RBV  access site and **pulse  per protocol  -bedrest for 1 hour   - cardiac rehab info provided / referral and communication to cardiac rehab competed   - No post hydration due to elevated RHP     #A Fib with RVR now rate controlled  -Cont rate control with BB po daily  -Cont A/C with  eliquis po bid  Continuos tele monitoring  -Repeat ECG if change in rhythm  -EP consult if indicated

## 2024-02-12 NOTE — CHART NOTE - NSCHARTNOTEFT_GEN_A_CORE
Department of Cardiology                                                                  [facility]/[place] [address] [address] [address]                                                            Telephone: [phone]. Fax:[phone]                                                                             Cardiology PRE CATH Progress Note          PRECATH INDICATIONS:   + TROPONIN, NEW ONSET AFIB                                                                                                                                                                                                                Angina class: CCS III - NO   Received in holding room  NAD and HDS. DOES APPEAR FLUID OVER LOADED                                                                                                                                CARDIOLOGY SUMMARY  Echo Date:   Prior Cardiac Interventions:  Stress Test: Date: NA    CTA IMPRESSION:  No evidence acute pulmonary emboli or other acute intrathoracic pathology.  Little change in multiple small and mildly prominent mediastinal and   bilateral hilar lymph nodes, several of which are slightly more prominent.      Risk Assessments:  ASA: 2  Mallampati: 3   Bleeding Risk: 2.9   Creatinine: 1.25   GFR: 61     Associated Risk Factors:        Frailty Assessment (none/mild/mod/severe)       Cerebrovascular Disease: N/A       Chronic Lung Disease: N/A       Peripheral Arterial Disease: N/A       Chronic Kidney Disease (if yes, what is GFR): N/A       Uncontrolled Diabetes (if yes, what is HgbA1C or FBS): N/A       Poorly Controlled Hypertension (if yes, what is SBP): N/A       Morbid Obesity (if yes, what is BMI): N/A       History of Recent Ventricular Arrhythmia: N/A       Inability to Ambulate Safely: N/A       Need for Therapeutic Anticoagulation: N/A       Antiplatelet or Contrast Allergy: N/A    Antianginal Therapies:        Beta Blockers:  METOPROLOL        Calcium Channel Blockers:        Long Acting Nitrates:        Ranexa:     MEDICATIONS:  hydrochlorothiazide 12.5 milliGRAM(s) Oral daily  losartan 50 milliGRAM(s) Oral daily  metoprolol succinate ER 50 milliGRAM(s) Oral daily  acetaminophen     Tablet .. 650 milliGRAM(s) Oral every 6 hours PRN  melatonin 3 milliGRAM(s) Oral at bedtime PRN  ondansetron Injectable 4 milliGRAM(s) IV Push every 8 hours PRN  aluminum hydroxide/magnesium hydroxide/simethicone Suspension 30 milliLiter(s) Oral every 4 hours PRN  atorvastatin 40 milliGRAM(s) Oral at bedtime  aspirin enteric coated 81 milliGRAM(s) Oral daily  heparin   Injectable 4500 Unit(s) IV Push every 6 hours PRN  heparin   Injectable 9500 Unit(s) IV Push every 6 hours PRN  heparin  Infusion.  Unit(s)/Hr IV Continuous <Continuous>        ROS: as stated above, otherwise negative    PHYSICAL EXAM:  Constitutional: A & O x 3  HEENT:   Normal oral mucosa, PERRL, EOMI	  Cardiovascular: Normal S1 S2, No JVD, No murmurs  Respiratory: Lungs clear to auscultation	  Gastrointestinal:  Soft, Non-tender, + BS	  Skin: No rashes, No ecchymoses, No cyanosis  Neurologic: Non-focal  Extremities: Normal range of motion, no edema  Vascular access site  Peripheral pulses palpable + bilaterally    T(C): 36.6 (02-12-24 @ 09:01), Max: 37.8 (02-12-24 @ 04:30)  HR: 78 (02-12-24 @ 09:01) (59 - 90)  BP: 133/70 (02-12-24 @ 09:01) (115/58 - 152/73)  RR: 16 (02-12-24 @ 09:01) (16 - 18)  SpO2: 95% (02-12-24 @ 09:01) (92% - 98%)    TELEMETRY: AFIB 	      LABS:	 	                          13.6   9.75  )-----------( 182      ( 12 Feb 2024 04:55 )             40.4     02-11    142  |  104  |  23.2<H>  ----------------------------<  97  4.2   |  26.0  |  1.25    Ca    8.9      11 Feb 2024 05:10  Mg     2.0     02-11      Lipid Profile:   HgA1c:   proBNP: 3108      A/P:  72 YO MALE WITH PRIOR PCI TO RCA IN 2020 NOW FOR NO NEW ONSET AFIB + TROPONINS PRESENTING FOR LHC    #Pre cath  -plan for LHC via RA vs FA  -patient seen and examined  -confirmed appropriate NPO duration  -ECG and Labs reviewed  -Aspirin 81mg po pre-cath  -NS 250mL IV bolus pre-cath NON DUE TO FLUID OVER LOADED.   -procedure discussed with patient; risks and benefits explained, questions answered  -consent obtained by attending IC

## 2024-02-12 NOTE — PROGRESS NOTE ADULT - PROBLEM SELECTOR PLAN 2
TTE:  EF 60-65%, mild to moderate MR, possible echo on PE, severe pHTN  CTA negative for PE  On heparin

## 2024-02-12 NOTE — PROGRESS NOTE ADULT - SUBJECTIVE AND OBJECTIVE BOX
[facility]                                                         CARDIOLOGY AT [facility]                                                                  [address], [place] [address]                                                         Telephone: [phone]. Fax:[phone]                                                                             PROGRESS NOTE    Reason for follow up:   New onset afib / severe RV failure  Update:   R/Lhc today - vasoreactivity today  Review of symptoms:   Cardiac:  No chest pain. No dyspnea. No palpitations.  Respiratory: no cough. No dyspnea  Gastrointestinal: No diarrhea. No abdominal pain. No bleeding.   Neuro: No focal neuro complaints.    Vitals:  T(C): 36.6 (02-12-24 @ 09:01), Max: 37.8 (02-12-24 @ 04:30)  HR: 78 (02-12-24 @ 09:01) (59 - 90)  BP: 133/70 (02-12-24 @ 09:01) (115/58 - 152/73)  RR: 16 (02-12-24 @ 09:01) (16 - 18)  SpO2: 95% (02-12-24 @ 09:01) (92% - 98%)    I&O's Summary  11 Feb 2024 07:01  -  12 Feb 2024 07:00  --------------------------------------------------------  IN: 443 mL / OUT: 400 mL / NET: 43 mL  Weight (kg): 115.7 (02-09 @ 11:57)    PHYSICAL EXAM:  Appearance: Comfortable. No acute distress  HEENT:  Atraumatic. Normocephalic.  Normal oral mucosa  Neurologic: A & O x 3, no gross focal deficits.  Cardiovascular: RRR S1 S2, No murmur, no rubs/gallops. No JVD  Respiratory: Lungs clear to auscultation, unlabored   Gastrointestinal:  Soft, Non-tender, + BS  Lower Extremities: + Peripheral Pulses, No clubbing, cyanosis, or edema  Psychiatry: Patient is calm. No agitation.   Skin: warm and dry.    CURRENT CARDIAC MEDICATIONS:  hydrochlorothiazide 12.5 milliGRAM(s) Oral daily  losartan 50 milliGRAM(s) Oral daily  metoprolol succinate ER 50 milliGRAM(s) Oral daily    CURRENT OTHER MEDICATIONS:  acetaminophen     Tablet .. 650 milliGRAM(s) Oral every 6 hours PRN Temp greater or equal to 38C (100.4F), Mild Pain (1 - 3)  melatonin 3 milliGRAM(s) Oral at bedtime PRN Insomnia  ondansetron Injectable 4 milliGRAM(s) IV Push every 8 hours PRN Nausea and/or Vomiting  aluminum hydroxide/magnesium hydroxide/simethicone Suspension 30 milliLiter(s) Oral every 4 hours PRN Dyspepsia  atorvastatin 40 milliGRAM(s) Oral at bedtime  aspirin enteric coated 81 milliGRAM(s) Oral daily  heparin   Injectable 4500 Unit(s) IV Push every 6 hours PRN For aPTT between 40 - 57  heparin   Injectable 9500 Unit(s) IV Push every 6 hours PRN For aPTT less than 40  heparin  Infusion.  Unit(s)/Hr (21 mL/Hr) IV Continuous <Continuous>    LABS:	 	                          13.6   9.75  )-----------( 182      ( 12 Feb 2024 04:55 )             40.4     02-11    142  |  104  |  23.2<H>  ----------------------------<  97  4.2   |  26.0  |  1.25    Ca    8.9      11 Feb 2024 05:10  Mg     2.0     02-11      PT/INR/PTT ( 12 Feb 2024 06:15 )                       :                       :      X            :       104.8                 .        .                   .              .           .       X           .                                         TSH: Thyroid Stimulating Hormone, Serum: 1.91 uIU/mL    TELEMETRY:   Tele - afib                                                                   [facility]                                                         CARDIOLOGY AT [facility]                                                                  [address], [place] [address]                                                         Telephone: [phone]. Fax:[phone]                                                                             PROGRESS NOTE    Reason for follow up:   New onset afib / severe RV failure  Update:   R/Lhc today - vasoreactivity today  Review of symptoms:   Cardiac:  No chest pain. No dyspnea. No palpitations.  Respiratory: no cough. No dyspnea  Gastrointestinal: No diarrhea. No abdominal pain. No bleeding.   Neuro: No focal neuro complaints.    Vitals:  T(C): 36.6 (02-12-24 @ 09:01), Max: 37.8 (02-12-24 @ 04:30)  HR: 78 (02-12-24 @ 09:01) (59 - 90)  BP: 133/70 (02-12-24 @ 09:01) (115/58 - 152/73)  RR: 16 (02-12-24 @ 09:01) (16 - 18)  SpO2: 95% (02-12-24 @ 09:01) (92% - 98%)    I&O's Summary  11 Feb 2024 07:01  -  12 Feb 2024 07:00  --------------------------------------------------------  IN: 443 mL / OUT: 400 mL / NET: 43 mL  Weight (kg): 115.7 (02-09 @ 11:57)    PHYSICAL EXAM:  Appearance: Comfortable. No acute distress  HEENT:  Atraumatic. Normocephalic.  Normal oral mucosa  Neurologic: A & O x 3, no gross focal deficits.  Cardiovascular: RRR S1 S2, No murmur, no rubs/gallops. No JVD  Respiratory: Lungs clear to auscultation, unlabored   Gastrointestinal:  Soft, Non-tender, + BS  Lower Extremities: + Peripheral Pulses, No clubbing, cyanosis, + edema - increase on left  Psychiatry: Patient is calm. No agitation.   Skin: warm and dry.    CURRENT CARDIAC MEDICATIONS:  hydrochlorothiazide 12.5 milliGRAM(s) Oral daily  losartan 50 milliGRAM(s) Oral daily  metoprolol succinate ER 50 milliGRAM(s) Oral daily    CURRENT OTHER MEDICATIONS:  acetaminophen     Tablet .. 650 milliGRAM(s) Oral every 6 hours PRN Temp greater or equal to 38C (100.4F), Mild Pain (1 - 3)  melatonin 3 milliGRAM(s) Oral at bedtime PRN Insomnia  ondansetron Injectable 4 milliGRAM(s) IV Push every 8 hours PRN Nausea and/or Vomiting  aluminum hydroxide/magnesium hydroxide/simethicone Suspension 30 milliLiter(s) Oral every 4 hours PRN Dyspepsia  atorvastatin 40 milliGRAM(s) Oral at bedtime  aspirin enteric coated 81 milliGRAM(s) Oral daily  heparin   Injectable 4500 Unit(s) IV Push every 6 hours PRN For aPTT between 40 - 57  heparin   Injectable 9500 Unit(s) IV Push every 6 hours PRN For aPTT less than 40  heparin  Infusion.  Unit(s)/Hr (21 mL/Hr) IV Continuous <Continuous>    LABS:	 	                          13.6   9.75  )-----------( 182      ( 12 Feb 2024 04:55 )             40.4     02-11    142  |  104  |  23.2<H>  ----------------------------<  97  4.2   |  26.0  |  1.25    Ca    8.9      11 Feb 2024 05:10  Mg     2.0     02-11      PT/INR/PTT ( 12 Feb 2024 06:15 )                       :                       :      X            :       104.8                 .        .                   .              .           .       X           .                                         TSH: Thyroid Stimulating Hormone, Serum: 1.91 uIU/mL    TELEMETRY:   Tele - afib

## 2024-02-12 NOTE — PROGRESS NOTE ADULT - SUBJECTIVE AND OBJECTIVE BOX
TAMRA PERKINS  ----------------------------------------  The patient was seen earlier at bedside. Patient with atrial fibrillation and pulmonary hypertension. Offered no complaints.    Vital Signs Last 24 Hrs  T(C): 36.6 (12 Feb 2024 09:01), Max: 37.8 (12 Feb 2024 04:30)  T(F): 97.8 (12 Feb 2024 09:01), Max: 100.1 (12 Feb 2024 04:30)  HR: 75 (12 Feb 2024 11:35) (59 - 90)  BP: 131/68 (12 Feb 2024 11:35) (108/64 - 152/73)  BP(mean): --  RR: 16 (12 Feb 2024 11:35) (15 - 18)  SpO2: 91% (12 Feb 2024 11:35) (91% - 98%)    Parameters below as of 12 Feb 2024 11:35  Patient On (Oxygen Delivery Method): room air    PHYSICAL EXAMINATION:  ----------------------------------------  General appearance: No acute distress, Awake, Alert  HEENT: Normocephalic, Atraumatic, Conjunctiva clear, EOMI  Neck: Supple, No JVD, No tenderness  Lungs: Breath sound equal bilaterally, No wheezes, No rales  Cardiovascular: S1S2, Regular rhythm  Abdomen: Soft, Nontender, Nondistended, No guarding/rebound, Positive bowel sounds  Extremities: No clubbing, No cyanosis, No calf tenderness, Lower extremity edema  Neuro: Strength equal bilaterally, No tremors  Psychiatric: Appropriate mood, Normal affect    LABORATORY STUDIES:  ----------------------------------------             13.6   9.75  )-----------( 182      ( 12 Feb 2024 04:55 )             40.4     02-11    142  |  104  |  23.2<H>  ----------------------------<  97  4.2   |  26.0  |  1.25    Ca    8.9      11 Feb 2024 05:10  Mg     2.0     02-11    PTT - ( 12 Feb 2024 06:15 )  PTT:104.8 sec    Urinalysis Basic - ( 11 Feb 2024 05:10 )  Color: x / Appearance: x / SG: x / pH: x  Gluc: 97 mg/dL / Ketone: x  / Bili: x / Urobili: x   Blood: x / Protein: x / Nitrite: x   Leuk Esterase: x / RBC: x / WBC x   Sq Epi: x / Non Sq Epi: x / Bacteria: x    MEDICATIONS  (STANDING):  aspirin enteric coated 81 milliGRAM(s) Oral daily  atorvastatin 40 milliGRAM(s) Oral at bedtime  furosemide   Injectable 40 milliGRAM(s) IV Push once  heparin  Infusion.  Unit(s)/Hr (21 mL/Hr) IV Continuous <Continuous>  hydrochlorothiazide 12.5 milliGRAM(s) Oral daily  losartan 50 milliGRAM(s) Oral daily  metoprolol succinate ER 50 milliGRAM(s) Oral daily    MEDICATIONS  (PRN):  acetaminophen     Tablet .. 650 milliGRAM(s) Oral every 6 hours PRN Temp greater or equal to 38C (100.4F), Mild Pain (1 - 3)  aluminum hydroxide/magnesium hydroxide/simethicone Suspension 30 milliLiter(s) Oral every 4 hours PRN Dyspepsia  heparin   Injectable 4500 Unit(s) IV Push every 6 hours PRN For aPTT between 40 - 57  heparin   Injectable 9500 Unit(s) IV Push every 6 hours PRN For aPTT less than 40  melatonin 3 milliGRAM(s) Oral at bedtime PRN Insomnia  ondansetron Injectable 4 milliGRAM(s) IV Push every 8 hours PRN Nausea and/or Vomiting      ASSESSMENT / PLAN:  ----------------------------------------  73M with a history of coronary artery disease, hypertension, hyperlipidemia, NSVT, and lymphedema who was referred to the hospital with findings of atrial fibrillation. Echocardiogram was notable for right heart strain but CT angiogram was without pulmonary embolism.    Atrial fibrillation  - On metoprolol  - Heparin for anticoagulation  - For resumption of apixaban pending results for cardiac catheterization    Pulmonary hypertension  - Noted on echocardiogram  - CT angiogram was without pulmonary embolism  - For cardiac catheterization today    Coronary artery disease  - On aspirin, atorvastatin, and metoprolol  - Further ischemic evaluation per Cardiology    Chronic kidney disease  - Monitoring renal function  - Continue on losartan for now    Hypertension  - Close blood pressure monitoring  - On hydrochlorothiazide and losartan

## 2024-02-13 DIAGNOSIS — I50.21 ACUTE SYSTOLIC (CONGESTIVE) HEART FAILURE: ICD-10-CM

## 2024-02-13 DIAGNOSIS — I50.30 UNSPECIFIED DIASTOLIC (CONGESTIVE) HEART FAILURE: ICD-10-CM

## 2024-02-13 LAB
ALBUMIN SERPL ELPH-MCNC: 3.4 G/DL — SIGNIFICANT CHANGE UP (ref 3.3–5.2)
ALP SERPL-CCNC: 79 U/L — SIGNIFICANT CHANGE UP (ref 40–120)
ALT FLD-CCNC: 15 U/L — SIGNIFICANT CHANGE UP
ANION GAP SERPL CALC-SCNC: 15 MMOL/L — SIGNIFICANT CHANGE UP (ref 5–17)
AST SERPL-CCNC: 22 U/L — SIGNIFICANT CHANGE UP
BASOPHILS # BLD AUTO: 0 K/UL — SIGNIFICANT CHANGE UP (ref 0–0.2)
BASOPHILS NFR BLD AUTO: 0 % — SIGNIFICANT CHANGE UP (ref 0–2)
BILIRUB SERPL-MCNC: 2.7 MG/DL — HIGH (ref 0.4–2)
BUN SERPL-MCNC: 24.7 MG/DL — HIGH (ref 8–20)
CALCIUM SERPL-MCNC: 8.8 MG/DL — SIGNIFICANT CHANGE UP (ref 8.4–10.5)
CHLORIDE SERPL-SCNC: 100 MMOL/L — SIGNIFICANT CHANGE UP (ref 96–108)
CO2 SERPL-SCNC: 27 MMOL/L — SIGNIFICANT CHANGE UP (ref 22–29)
CREAT SERPL-MCNC: 1.64 MG/DL — HIGH (ref 0.5–1.3)
EGFR: 44 ML/MIN/1.73M2 — LOW
EOSINOPHIL # BLD AUTO: 0 K/UL — SIGNIFICANT CHANGE UP (ref 0–0.5)
EOSINOPHIL NFR BLD AUTO: 0 % — SIGNIFICANT CHANGE UP (ref 0–6)
GIANT PLATELETS BLD QL SMEAR: PRESENT — SIGNIFICANT CHANGE UP
GLUCOSE SERPL-MCNC: 120 MG/DL — HIGH (ref 70–99)
HCT VFR BLD CALC: 42.7 % — SIGNIFICANT CHANGE UP (ref 39–50)
HGB BLD-MCNC: 14.1 G/DL — SIGNIFICANT CHANGE UP (ref 13–17)
LYMPHOCYTES # BLD AUTO: 0.73 K/UL — LOW (ref 1–3.3)
LYMPHOCYTES # BLD AUTO: 6.2 % — LOW (ref 13–44)
MANUAL SMEAR VERIFICATION: SIGNIFICANT CHANGE UP
MCHC RBC-ENTMCNC: 31.8 PG — SIGNIFICANT CHANGE UP (ref 27–34)
MCHC RBC-ENTMCNC: 33 GM/DL — SIGNIFICANT CHANGE UP (ref 32–36)
MCV RBC AUTO: 96.4 FL — SIGNIFICANT CHANGE UP (ref 80–100)
MONOCYTES # BLD AUTO: 1.56 K/UL — HIGH (ref 0–0.9)
MONOCYTES NFR BLD AUTO: 13.3 % — SIGNIFICANT CHANGE UP (ref 2–14)
NEUTROPHILS # BLD AUTO: 9.03 K/UL — HIGH (ref 1.8–7.4)
NEUTROPHILS NFR BLD AUTO: 77 % — SIGNIFICANT CHANGE UP (ref 43–77)
PLAT MORPH BLD: NORMAL — SIGNIFICANT CHANGE UP
PLATELET # BLD AUTO: 216 K/UL — SIGNIFICANT CHANGE UP (ref 150–400)
POLYCHROMASIA BLD QL SMEAR: SLIGHT — SIGNIFICANT CHANGE UP
POTASSIUM SERPL-MCNC: 3.6 MMOL/L — SIGNIFICANT CHANGE UP (ref 3.5–5.3)
POTASSIUM SERPL-SCNC: 3.6 MMOL/L — SIGNIFICANT CHANGE UP (ref 3.5–5.3)
PROT SERPL-MCNC: 7.1 G/DL — SIGNIFICANT CHANGE UP (ref 6.6–8.7)
RBC # BLD: 4.43 M/UL — SIGNIFICANT CHANGE UP (ref 4.2–5.8)
RBC # FLD: 13 % — SIGNIFICANT CHANGE UP (ref 10.3–14.5)
RBC BLD AUTO: ABNORMAL
SODIUM SERPL-SCNC: 142 MMOL/L — SIGNIFICANT CHANGE UP (ref 135–145)
VARIANT LYMPHS # BLD: 3.5 % — SIGNIFICANT CHANGE UP (ref 0–6)
WBC # BLD: 11.73 K/UL — HIGH (ref 3.8–10.5)
WBC # FLD AUTO: 11.73 K/UL — HIGH (ref 3.8–10.5)

## 2024-02-13 PROCEDURE — 93010 ELECTROCARDIOGRAM REPORT: CPT

## 2024-02-13 PROCEDURE — 99233 SBSQ HOSP IP/OBS HIGH 50: CPT

## 2024-02-13 PROCEDURE — 99223 1ST HOSP IP/OBS HIGH 75: CPT

## 2024-02-13 RX ORDER — POTASSIUM CHLORIDE 20 MEQ
40 PACKET (EA) ORAL ONCE
Refills: 0 | Status: COMPLETED | OUTPATIENT
Start: 2024-02-13 | End: 2024-02-13

## 2024-02-13 RX ADMIN — ATORVASTATIN CALCIUM 40 MILLIGRAM(S): 80 TABLET, FILM COATED ORAL at 22:10

## 2024-02-13 RX ADMIN — LOSARTAN POTASSIUM 50 MILLIGRAM(S): 100 TABLET, FILM COATED ORAL at 06:11

## 2024-02-13 RX ADMIN — CLOPIDOGREL BISULFATE 75 MILLIGRAM(S): 75 TABLET, FILM COATED ORAL at 11:12

## 2024-02-13 RX ADMIN — Medication 3 MILLIGRAM(S): at 22:10

## 2024-02-13 RX ADMIN — Medication 40 MILLIGRAM(S): at 06:11

## 2024-02-13 RX ADMIN — Medication 50 MILLIGRAM(S): at 06:11

## 2024-02-13 RX ADMIN — Medication 81 MILLIGRAM(S): at 11:11

## 2024-02-13 RX ADMIN — Medication 40 MILLIEQUIVALENT(S): at 18:56

## 2024-02-13 NOTE — PROGRESS NOTE ADULT - SUBJECTIVE AND OBJECTIVE BOX
TAMRA PERKINS  ----------------------------------------  The patient was seen earlier at bedside. Patient with atrial fibrillation and pulmonary hypertension. Noted some lightheadedness this morning.    Vital Signs Last 24 Hrs  T(C): 36.5 (13 Feb 2024 07:45), Max: 37.8 (13 Feb 2024 00:00)  T(F): 97.7 (13 Feb 2024 07:45), Max: 100 (13 Feb 2024 00:00)  HR: 80 (13 Feb 2024 08:16) (62 - 101)  BP: 107/69 (13 Feb 2024 08:16) (91/55 - 159/96)  BP(mean): 96 (12 Feb 2024 16:35) (96 - 96)  RR: 18 (13 Feb 2024 07:45) (13 - 19)  SpO2: 95% (13 Feb 2024 07:45) (91% - 98%)    Parameters below as of 13 Feb 2024 07:45  Patient On (Oxygen Delivery Method): room air    PHYSICAL EXAMINATION:  ----------------------------------------  General appearance: No acute distress, Awake, Alert  HEENT: Normocephalic, Atraumatic, Conjunctiva clear, EOMI  Neck: Supple, No JVD, No tenderness  Lungs: Breath sound equal bilaterally, No wheezes, No rales  Cardiovascular: S1S2, Regular rhythm  Abdomen: Soft, Nontender, Nondistended, No guarding/rebound, Positive bowel sounds  Extremities: No clubbing, No cyanosis, No calf tenderness, Lower extremity edema slightly improved  Neuro: Strength equal bilaterally, No tremors  Psychiatric: Appropriate mood, Normal affect    LABORATORY STUDIES:  ----------------------------------------             14.1   11.73 )-----------( 216      ( 13 Feb 2024 05:07 )             42.7     02-13    142  |  100  |  24.7<H>  ----------------------------<  120<H>  3.6   |  27.0  |  1.64<H>    Ca    8.8      13 Feb 2024 05:07    TPro  7.1  /  Alb  3.4  /  TBili  2.7<H>  /  DBili  x   /  AST  22  /  ALT  15  /  AlkPhos  79  02-13    LIVER FUNCTIONS - ( 13 Feb 2024 05:07 )  Alb: 3.4 g/dL / Pro: 7.1 g/dL / ALK PHOS: 79 U/L / ALT: 15 U/L / AST: 22 U/L / GGT: x           PTT - ( 12 Feb 2024 06:15 )  PTT:104.8 sec    02-12-24 @ 07:01  -  02-13-24 @ 07:00  --------------------------------------------------------  IN: 120 mL / OUT: 1970 mL / NET: -1850 mL    Urinalysis Basic - ( 13 Feb 2024 05:07 )  Color: x / Appearance: x / SG: x / pH: x  Gluc: 120 mg/dL / Ketone: x  / Bili: x / Urobili: x   Blood: x / Protein: x / Nitrite: x   Leuk Esterase: x / RBC: x / WBC x   Sq Epi: x / Non Sq Epi: x / Bacteria: x    MEDICATIONS  (STANDING):  apixaban 5 milliGRAM(s) Oral two times a day  aspirin enteric coated 81 milliGRAM(s) Oral daily  atorvastatin 40 milliGRAM(s) Oral at bedtime  clopidogrel Tablet 75 milliGRAM(s) Oral daily  losartan 50 milliGRAM(s) Oral daily  metoprolol succinate ER 50 milliGRAM(s) Oral daily    MEDICATIONS  (PRN):  acetaminophen     Tablet .. 650 milliGRAM(s) Oral every 6 hours PRN Temp greater or equal to 38C (100.4F), Mild Pain (1 - 3)  aluminum hydroxide/magnesium hydroxide/simethicone Suspension 30 milliLiter(s) Oral every 4 hours PRN Dyspepsia  melatonin 3 milliGRAM(s) Oral at bedtime PRN Insomnia  ondansetron Injectable 4 milliGRAM(s) IV Push every 8 hours PRN Nausea and/or Vomiting      ASSESSMENT / PLAN:  ----------------------------------------  73M with a history of coronary artery disease, hypertension, hyperlipidemia, NSVT, and lymphedema who was referred to the hospital with findings of atrial fibrillation. Echocardiogram was notable for right heart strain but CT angiogram was without pulmonary embolism. The patient underwent cardiac catheterization with tent placement to the proximal LAD.    Atrial fibrillation  - Rate controlled with metoprolol  - On apixaban for anticoagulation    Pulmonary hypertension  - Noted on echocardiogram  - CT angiogram was without pulmonary embolism  - To follow up with Pulmonary on an outpatient basis for further management    Coronary artery disease  - On aspirin, atorvastatin, and metoprolol  - Status post stent placement to the proximal LAD    Chronic kidney disease  - Monitoring renal function  - Creatinine elevated  - To hold furosemide for now  - Continue on losartan for now    Hypertension  - Close blood pressure monitoring  - Hypotension noted this morning  - Hydrochlorothiazide and furosemide held for now

## 2024-02-13 NOTE — PROGRESS NOTE ADULT - PROBLEM SELECTOR PLAN 2
s/p RCA stent  now s/p Lad stent  Advised the importance of taking asa and Plavix every day  cardiac rehab info provided/referral and communication to cardiac rehab completed  Wrist instructions given to patient

## 2024-02-13 NOTE — CONSULT NOTE ADULT - SUBJECTIVE AND OBJECTIVE BOX
[place] CARDIAC ELECTROPHYSIOLOGY  [facility]   Office: [address] [address]  Telephone: [phone] Fax:[phone]      HPI:  Pt is a 74 y/o male with CAD s/p PCI 1DES to RCA(2020) now s/p DANO to pLAD (2/12/24 Selim), HTN, HLD, chronic lymphedema, who was sent to [facility] ED on 2/9/24 by his primary cardiologist (Dr. Salas) after c/o of NO x 10 and noted to be in new onset AF with RVR. Pts ventricular rates have been relatively controlled (averaging 70-80s) and he reports no complaints of palpitations or chest pain. Pt underwent LHC yesterday and received a DANO to his pLAD. RHC cath was also performed which revealed elevated PCWP (25) and he was diuresed with lasix. At time of assessment pt reports significant improvement in SOB and offers no complaints. Denies chest pain, dizziness, fever, chills, SOB, recent or remote syncopal events.    EKG: Atrial fibrillation.  VR 81BPM  Telemetry: Atrial fibrillation with controlled ventricular rates      PAST MEDICAL & SURGICAL HISTORY:  Hypertension      Wrist pain, right      Obesity      HTN (hypertension)      NSVT (nonsustained ventricular tachycardia)      Former cigarette smoker      BPH (benign prostatic hyperplasia)      S/P hernia repair  1980      S/P knee surgery  righty scope in 1990 and left meniscus tear in 1977      S/P colonoscopy      H/O prostate biopsy      REVIEW OF SYSTEMS:    CONSTITUTIONAL: No fever, weight loss, or fatigue  EYES: No visual disturbances  RESPIRATORY: +NO (resolved) No cough, wheezing, chills or hemoptysis;  CARDIOVASCULAR: see HPI  GASTROINTESTINAL: No abdominal or epigastric pain. No nausea, vomiting, or hematemesis; No diarrhea or constipation. No melena or hematochezia.  NEUROLOGICAL: No headaches, memory loss, loss of strength, numbness, or tremors  SKIN: No itching, burning, rashes, or lesions   HEME/LYMPH: No easy bruising, or bleeding       MEDICATIONS  (STANDING):  apixaban 5 milliGRAM(s) Oral two times a day  aspirin enteric coated 81 milliGRAM(s) Oral daily  atorvastatin 40 milliGRAM(s) Oral at bedtime  clopidogrel Tablet 75 milliGRAM(s) Oral daily  losartan 50 milliGRAM(s) Oral daily  metoprolol succinate ER 50 milliGRAM(s) Oral daily    MEDICATIONS  (PRN):  acetaminophen     Tablet .. 650 milliGRAM(s) Oral every 6 hours PRN Temp greater or equal to 38C (100.4F), Mild Pain (1 - 3)  aluminum hydroxide/magnesium hydroxide/simethicone Suspension 30 milliLiter(s) Oral every 4 hours PRN Dyspepsia  melatonin 3 milliGRAM(s) Oral at bedtime PRN Insomnia  ondansetron Injectable 4 milliGRAM(s) IV Push every 8 hours PRN Nausea and/or Vomiting      Allergies    No Known Allergies    Intolerances        SOCIAL HISTORY:  Former smoker  Occasional ETOH  Denies illicit drug use      FAMILY HISTORY:  Family history of COPD (chronic obstructive pulmonary disease) (Father)    Family history of heart disease (Father)    Family history of cancer (Mother)  uterus        Vital Signs Last 24 Hrs  T(C): 36.7 (13 Feb 2024 13:07), Max: 37.8 (13 Feb 2024 00:00)  T(F): 98.1 (13 Feb 2024 13:07), Max: 100 (13 Feb 2024 00:00)  HR: 87 (13 Feb 2024 13:07) (75 - 101)  BP: 100/66 (13 Feb 2024 13:07) (91/55 - 141/85)  BP(mean): 96 (12 Feb 2024 16:35) (96 - 96)  RR: 18 (13 Feb 2024 13:07) (18 - 19)  SpO2: 95% (13 Feb 2024 13:07) (92% - 98%)    Parameters below as of 13 Feb 2024 07:45  Patient On (Oxygen Delivery Method): room air        Physical Exam:    Constitutional: NAD  Head: normocephalic atraumatic  Chest wall: normal in appearance, nontender to palpation  Resp: effort normal, breath sounds clear to auscultation bilaterally  Cardiac: Heart regular rate and rhythm, no murmurs, rubs, or gallops.  trace b/l LE edema  Abdomen: soft, nondistended, nontender to palpation in all four quadrants    Musculoskeletal: full range of motion all extremities with no pain, tenderness, swelling, or erythema    Neuro: Alert and oriented x 3,  Skin: color normal, no rashes or injury  Psych: mood calm       LABS:                        14.1   11.73 )-----------( 216      ( 13 Feb 2024 05:07 )             42.7   02-13    142  |  100  |  24.7<H>  ----------------------------<  120<H>  3.6   |  27.0  |  1.64<H>    Ca    8.8      13 Feb 2024 05:07    TPro  7.1  /  Alb  3.4  /  TBili  2.7<H>  /  DBili  x   /  AST  22  /  ALT  15  /  AlkPhos  79  02-13  LIVER FUNCTIONS - ( 13 Feb 2024 05:07 )  Alb: 3.4 g/dL / Pro: 7.1 g/dL / ALK PHOS: 79 U/L / ALT: 15 U/L / AST: 22 U/L / GGT: x           PTT - ( 12 Feb 2024 06:15 )  PTT:104.8 sec    Urinalysis Basic - ( 13 Feb 2024 05:07 )    Color: x / Appearance: x / SG: x / pH: x  Gluc: 120 mg/dL / Ketone: x  / Bili: x / Urobili: x   Blood: x / Protein: x / Nitrite: x   Leuk Esterase: x / RBC: x / WBC x   Sq Epi: x / Non Sq Epi: x / Bacteria: x        RADIOLOGY & ADDITIONAL STUDIES:    < from: TTE W or WO Ultrasound Enhancing Agent (02.10.24 @ 08:44) >  1. Moderate left ventricular hypertrophy.   2. Left ventricular cavity is normal. Left ventricular systolic function is normal with an ejection fraction visually estimated at 60 to 65 %. There are no regional wall motion abnormalities seen.   3. Elevated filling pressure. Analysis of left ventricular diastolic function and filling pressure is made challenging by the presence of atrial fibrillation.   4. Normal right ventricular cavity size, increasedwall thickness, and borderline reduced systolic function.   5. The left atrium is mildly dilated.   6. The right atrium is moderately dilated.   7. Mild to moderate mitral regurgitation.   8. Estimated pulmonary artery systolic pressure is 78 mmHg, severe pulmonary hypertension.   9. Mild to moderate tricuspid regurgitation.  10. Moderately dilated main pulmonary artery.  11. There is mild calcification of the mitral valve annulus.  12. No pericardial effusion seen.  13. Dilated pulmnonary artery, echodensity suggestive of pulmonary embolism.Recommend CTA.          Assessment:    Pt is a 74 y/o male with CAD s/p PCI 1DES to RCA(2020) now s/p DANO to pLAD (2/12/24 Dereck), HTN, HLD, chronic lymphedema, who was sent to [facility] ED on 2/9/24 by his primary cardiologist (Dr. Salas) after c/o of NO x 10 and noted to be in new onset AF with RVR. Pts ventricular rates have been relatively controlled (averaging 70-80s) and he reports no complaints of palpitations or chest pain. Pt underwent LHC yesterday and received a DANO to his pLAD. RHC cath was also performed which revealed elevated PCWP (25) and he was diuresed with lasix. At time of assessment pt reports significant improvement in SOB and offers no complaints. Denies chest pain, dizziness, fever, chills, SOB, recent or remote syncopal events.    Plan:  1) AF with controlled VR  - c/w Toprol 50mg QD  - CHADSVASC = 4. c/w Eliquis 5mg Q12HR  - NPO after midnight for JAMA / DCCV in AM  - c/w Telemetry monitoring while admitted     2) CAD  - s/p stent to pLAD  - On Aspirin / Plavix  - Likely DC Aspirin in 1 month and c/w dual therapy with Eliquis and Plavix if ok with general cards  - Case discussed with Dr. Harden, no contraindication for DCCV following his recent PCI    3) HFpEF  - GDMT as per general cards    Discussed with Dr. Ruiz, further recommendations to follow        
                                             [facility]                                              CARDIOLOGY AT [facility]                                                   [address], [place] [address]                                             Telephone: [phone]. Fax:[phone]                                                       CARDIOLOGY CONSULTATION NOTE                                                                                             History obtained by: Patient and medical record   [facility] Cardiologist: Maritza    obtained: Yes [  ] No [ x ]  Reason for Consultation: Atrial Fibrillation  Available out pt records reviewed: Yes [ x ] No [  ]    Chief complaint:    Patient is a 73y old  Male who presents with a chief complaint of atrial fibrillation     HPI:  73/M, pmhx of CAD s/p PCI 1DES rca, htn, hld, svt, nsvt sent in from cardiologist ( dr. benedict) office for new onse Afib RVR. Reports some dyspnea on exertion since a week, progressively worsening these past couple of days with pedal edema. No orthopnea, chest pain, palpitations, dizziness or syncope, fever, chills, SOB.    PAST MEDICAL HISTORY  Hypertension    Wrist pain, right    Obesity    HTN (hypertension)    NSVT (nonsustained ventricular tachycardia)    Former cigarette smoker    BPH (benign prostatic hyperplasia)    PAST SURGICAL HISTORY  S/P hernia repair    S/P knee surgery    S/P colonoscopy    H/O prostate biopsy    SUBSTANCE USE HISTORY  Denies current and previous substance use [  ]   CIGARETTES -   ALCOHOL -   DRUGS -     FAMILY HISTORY:  Family history of COPD (chronic obstructive pulmonary disease) (Father)    Family history of heart disease (Father)    Family history of cancer (Mother)  uterus    CARDIAC SPECIFIC FAMILY HX   No KNOWN family history of Cardiovascular disease, CAD, or sudden death in first degree relatives unless specified below  Family History of Cardiovascular Disease:  [  ]   Coronary Artery Disease in first degree relative:  [  ]   Sudden Cardiac Death in First degree relative: [  ]    HOME MEDICATIONS:  aspirin 81 mg oral tablet: 1 tab(s) orally once a day (22 May 2015 10:14)  Norvasc 5 mg oral tablet: 1 tab(s) orally once a day (22 May 2015 10:14)    CURRENT CARDIAC MEDICATIONS:    CURRENT OTHER MEDICATIONS:    ALLERGIES:   No Known Allergies    VITAL SIGNS:  T(C): 37.1 (02-09-24 @ 15:14), Max: 37.1 (02-09-24 @ 15:14)  T(F): 98.7 (02-09-24 @ 15:14), Max: 98.7 (02-09-24 @ 15:14)  HR: 71 (02-09-24 @ 15:14) (71 - 78)  BP: 161/85 (02-09-24 @ 15:14) (154/84 - 161/85)  RR: 18 (02-09-24 @ 15:14) (18 - 20)  SpO2: 95% (02-09-24 @ 15:14) (95% - 97%)    INTAKE AND OUTPUT:     LABS:                        14.0   7.70  )-----------( 182      ( 09 Feb 2024 13:50 )             43.1     02-09    139  |  100  |  25.9<H>  ----------------------------<  97  4.3   |  28.0  |  1.41<H>    Ca    9.3      09 Feb 2024 13:50  Mg     2.2     02-09    TPro  7.7  /  Alb  4.0  /  TBili  1.6  /  DBili  x   /  AST  27  /  ALT  24  /  AlkPhos  92  02-09    PT/INR - ( 09 Feb 2024 13:50 )   PT: 12.3 sec;   INR: 1.11 ratio         PTT - ( 09 Feb 2024 13:50 )  PTT:27.0 sec  Urinalysis Basic - ( 09 Feb 2024 13:50 )    Color: x / Appearance: x / SG: x / pH: x  Gluc: 97 mg/dL / Ketone: x  / Bili: x / Urobili: x   Blood: x / Protein: x / Nitrite: x   Leuk Esterase: x / RBC: x / WBC x   Sq Epi: x / Non Sq Epi: x / Bacteria: x    Thyroid Stimulating Hormone, Serum: 1.91 uIU/mL (02-09-24 @ 13:50)    RADIOLOGY IMAGING:   Xray Chest 2 Views PA/Lat: Urgent   Indication: Shortness of Breath  Transport: Stretcher-Crib  Exam Completed (02-09-24 @ 13:18) [Results Available]  12 Lead ECG:   Provider's Contact #: [phone] (02-09-24 @ 12:02) [Completed]

## 2024-02-13 NOTE — CONSULT NOTE ADULT - NS ATTEND AMEND GEN_ALL_CORE FT
pt with new onset persistent AF, associated with symptoms of fatigue and dyspnea.   Particularly as this is initial episode of AF, would benefit from restoration of sinus rhythm. I suspect he will feel better with rhythm control as well.   Once back on consistent anticoagulation, will plan JAMA/DCCV.   outpt followup, and consideration of AF ablation in the future.   He will need to be on triple anticoagulant therapy following recent PCI to LAD and AF/DCCV. Would stop ASA as soon as possible (per Cath team), and then continue plavix and Eliquis. The combination does make him increased bleeding risks, and would benefit from consideration of left atrial appendage occlusion.   Will arrange outpt followup for above,   Plan for JAMA/DCCV tomorrow.
Patient seen and examined at bedside and notes no chest pain or shortness of breath went for a routine cardiovisit and found to be in new onset Afib   He was doing well with no chest pain but had some shortness of breath with exertion as per the cardiologist   Trops 58->59    72 y/o M, PMH of CAD s/p PCI with DANO to the RCA, HTN, Dyslipidemia, Lymphedema (R> L LLE) and NSVT sent in from cardiologist ( dr. benedict) office for new onse Afib RVR.    New onset Afib with CHADSVASC 4   - in Afib with paroxysms of RVR   - on Toprol XL 25mg po q daily and will increase to Toprol XL 50mg po q daily   - will recommend TTE to assess LV function and valvulopathy   - if there is persistently elevated HR may require JAMA/DCCV   - keep in observation unit     CAD s/p PCI to the RCA Large RCA which wraps around apex. LAD is intramyocardial and does not supply apex.  RCA with critical mid lesion. LAD mid 40. PCI with 1 DANO. LM nl. Cx: mid 20. RCA: mid 99.   - HS trop 58->59  - EKG shows Afib with LAFB with non specific ST depressions   - pending TTE; can decide based on TTE if inpatient vs outpatient ishcemic eval   - no signs of volue overload patient has hx of lymphedema R>lL and CXR reviewdd no pleural effuson     CKD, baseline CR 1.4  currently at baseline     HTN   blood pressure elevated continue with HCTZ 12.5 and Losartan 50mg poq daily     Jaye Bess D.O. [facility]  Cardiology/Vascular Cardiology -[facility]   Telephone # [phone]

## 2024-02-13 NOTE — PROGRESS NOTE ADULT - SUBJECTIVE AND OBJECTIVE BOX
[facility]                                                         CARDIOLOGY AT [facility]                                                                  [address], [place] [address]                                                         Telephone: [phone]. Fax:[phone]                                                                             PROGRESS NOTE    Reason for follow up: CAD  Update: s/p cath yesterday with DANO LAD  Right heart cath:  W-25  PA 70 mean 44  PVR 3.76 PASP 50 C.O 4.7    Review of symptoms:   Cardiac:  No chest pain. + dyspnea. No palpitations.  Respiratory: no cough. No dyspnea  Gastrointestinal: No diarrhea. No abdominal pain. No bleeding.   Neuro: No focal neuro complaints.    Vitals:  T(C): 36.5 (02-13-24 @ 07:45), Max: 37.8 (02-13-24 @ 00:00)  HR: 80 (02-13-24 @ 08:16) (75 - 101)  BP: 107/69 (02-13-24 @ 08:16) (91/55 - 159/96)  RR: 18 (02-13-24 @ 07:45) (15 - 19)  SpO2: 95% (02-13-24 @ 07:45) (92% - 98%)  Wt(kg): --  I&O's Summary    12 Feb 2024 07:01  -  13 Feb 2024 07:00  --------------------------------------------------------  IN: 120 mL / OUT: 1970 mL / NET: -1850 mL  Weight (kg): 115.7 (02-09 @ 11:57)    PHYSICAL EXAM:  Appearance: Comfortable. No acute distress  HEENT:  Atraumatic. Normocephalic.  Normal oral mucosa  Neurologic: A & O x 3, no gross focal deficits.  Cardiovascular: RRR S1 S2, No murmur, no rubs/gallops. No JVD  Respiratory: Lungs  decreased breath sounds  Gastrointestinal:  Soft, Non-tender, + BS  Lower Extremities: No edema  Right radial site with good pulse no ecchymosis no hematoma  Psychiatry: Patient is calm. No agitation.   Skin: warm and dry.      CURRENT MEDICATIONS:  MEDICATIONS  (STANDING):  apixaban 5 milliGRAM(s) Oral two times a day  aspirin enteric coated 81 milliGRAM(s) Oral daily  atorvastatin 40 milliGRAM(s) Oral at bedtime  clopidogrel Tablet 75 milliGRAM(s) Oral daily  losartan 50 milliGRAM(s) Oral daily  metoprolol succinate ER 50 milliGRAM(s) Oral daily          LABS:	 	                            14.1   11.73 )-----------( 216      ( 13 Feb 2024 05:07 )             42.7     02-13    142  |  100  |  24.7<H>  ----------------------------<  120<H>  3.6   |  27.0  |  1.64<H>    Ca    8.8      13 Feb 2024 05:07    TPro  7.1  /  Alb  3.4  /  TBili  2.7<H>  /  DBili  x   /  AST  22  /  ALT  15  /  AlkPhos  79  02-13    proBNP:   Lipid Profile:   HgA1c:   TSH: Thyroid Stimulating Hormone, Serum: 1.91 uIU/mL        TELEMETRY:   ECG:  	      DIAGNOSTIC TESTING:  [ ] Echocardiogram:   [ ]  Catheterization:  [ ] Stress Test:    OTHER: 	                                                                [facility]                                                         CARDIOLOGY AT [facility]                                                                  [address], [place] [address]                                                         Telephone: [phone]. Fax:[phone]                                                                             PROGRESS NOTE    Reason for follow up: CAD  Update: s/p cath yesterday with DANO LAD  Right heart cath:  W-25  PA 70 mean 44  PVR 3.76 PASP 50 C.O 4.7    Review of symptoms:   Cardiac:  No chest pain. + dyspnea. No palpitations.  Respiratory: no cough. No dyspnea  Gastrointestinal: No diarrhea. No abdominal pain. No bleeding.   Neuro: No focal neuro complaints.    Vitals:  T(C): 36.5 (02-13-24 @ 07:45), Max: 37.8 (02-13-24 @ 00:00)  HR: 80 (02-13-24 @ 08:16) (75 - 101)  BP: 107/69 (02-13-24 @ 08:16) (91/55 - 159/96)  RR: 18 (02-13-24 @ 07:45) (15 - 19)  SpO2: 95% (02-13-24 @ 07:45) (92% - 98%)  Wt(kg): --  I&O's Summary    12 Feb 2024 07:01  -  13 Feb 2024 07:00  --------------------------------------------------------  IN: 120 mL / OUT: 1970 mL / NET: -1850 mL  Weight (kg): 115.7 (02-09 @ 11:57)    PHYSICAL EXAM:  Appearance: Comfortable. No acute distress  HEENT:  Atraumatic. Normocephalic.  Normal oral mucosa  Neurologic: A & O x 3, no gross focal deficits.  Cardiovascular: RRR S1 S2, No murmur, no rubs/gallops. No JVD  Respiratory: Lungs  decreased breath sounds  Gastrointestinal:  Soft, Non-tender, + BS  Lower Extremities: trace  edema  Right radial site with good pulse no ecchymosis no hematoma  Psychiatry: Patient is calm. No agitation.   Skin: warm and dry.      CURRENT MEDICATIONS:  MEDICATIONS  (STANDING):  apixaban 5 milliGRAM(s) Oral two times a day  aspirin enteric coated 81 milliGRAM(s) Oral daily  atorvastatin 40 milliGRAM(s) Oral at bedtime  clopidogrel Tablet 75 milliGRAM(s) Oral daily  losartan 50 milliGRAM(s) Oral daily  metoprolol succinate ER 50 milliGRAM(s) Oral daily    LABS:	 	                       14.1   11.73 )-----------( 216      ( 13 Feb 2024 05:07 )             42.7     02-13    142  |  100  |  24.7<H>  ----------------------------<  120<H>  3.6   |  27.0  |  1.64<H>    Ca    8.8      13 Feb 2024 05:07    TPro  7.1  /  Alb  3.4  /  TBili  2.7<H>  /  DBili  x   /  AST  22  /  ALT  15  /  AlkPhos  79  02-13    TSH: Thyroid Stimulating Hormone, Serum: 1.91 uIU/mL    TELEMETRY: ATrial fib with controlled ventricular rate    DIAGNOSTIC TESTING:  [ ] Echocardiogram: < from: TTE W or WO Ultrasound Enhancing Agent (02.10.24 @ 08:44) >   1. Moderate left ventricular hypertrophy.   2. Left ventricular cavity is normal. Left ventricular systolic function is normal with an ejection fraction visually estimated at 60 to 65 %. There are no regional wall motion abnormalities seen.   3. Elevated filling pressure. Analysis of left ventricular diastolic function and filling pressure is made challenging by the presence of atrial fibrillation.   4. Normal right ventricular cavity size, increasedwall thickness, and borderline reduced systolic function.   5. The left atrium is mildly dilated.   6. The right atrium is moderately dilated.   7. Mild to moderate mitral regurgitation.   8. Estimated pulmonary artery systolic pressure is 78 mmHg, severe pulmonary hypertension.   9. Mild to moderate tricuspid regurgitation.  10. Moderately dilated main pulmonary artery.  11. There is mild calcification of the mitral valve annulus.  12. No pericardial effusion seen.  13. Dilated pulmnonary artery, echodensity suggestive of pulmonary embolism.Recommend CTA.    [ ]  Catheterization:  < from: Cardiac Cath Lab - Adult (11.20.20 @ 17:54) >  CORONARY VESSELS: The coronary circulation is right dominant.  LM:   --  LM: Normal.  LAD:   --  Mid LAD: There was a 40 % stenosis.  CX:   --  Mid circumflex: There was a 20 % stenosis.  RCA:   --  Mid RCA: There was a tubular 99 % stenosis.  COMPLICATIONS: No complications occurred during the cath lab visit. No  complications occurred during the cath lab visit.  DIAGNOSTIC IMPRESSIONS: Large RCA which wraps around apex. LAD is  intramyocardial and does not supply apex.  RCA with critical mid lesion. PCI with 1 DANO.  Mildly depressed LV function.  DIAGNOSTIC RECOMMENDATIONS: Aspirin and plavix.  Statin therapy. Low dose beta blockers for SVT.  INTERVENTIONAL IMPRESSIONS: Large RCA which wraps around apex. LAD is  intramyocardial and does not supply apex.  RCA with critical mid lesion. PCI with 1 DANO.  Mildly depressed LV function.    < end of copied text >    [ ] Stress Test:    OTHER: 	                                                                [facility]                                                         CARDIOLOGY AT [facility]                                                                  [address], [place] [address]                                                         Telephone: [phone]. Fax:[phone]                                                                             PROGRESS NOTE    Reason for follow up: CAD  Update: s/p cath yesterday with DANO LAD  Right heart cath:  W-25  PA 70 mean 44  PVR 3.76 PASP 50 C.O 4.7    Review of symptoms:   Cardiac:  No chest pain. + dyspnea. No palpitations.  Respiratory: no cough. No dyspnea  Gastrointestinal: No diarrhea. No abdominal pain. No bleeding.   Neuro: No focal neuro complaints.    Vitals:  T(C): 36.5 (02-13-24 @ 07:45), Max: 37.8 (02-13-24 @ 00:00)  HR: 80 (02-13-24 @ 08:16) (75 - 101)  BP: 107/69 (02-13-24 @ 08:16) (91/55 - 159/96)  RR: 18 (02-13-24 @ 07:45) (15 - 19)  SpO2: 95% (02-13-24 @ 07:45) (92% - 98%)  Wt(kg): --  I&O's Summary    12 Feb 2024 07:01  -  13 Feb 2024 07:00  --------------------------------------------------------  IN: 120 mL / OUT: 1970 mL / NET: -1850 mL  Weight (kg): 115.7 (02-09 @ 11:57)    PHYSICAL EXAM:  Appearance: Comfortable. No acute distress  HEENT:  Atraumatic. Normocephalic.  Normal oral mucosa  Neurologic: A & O x 3, no gross focal deficits.  Cardiovascular: RRR S1 S2, No murmur, no rubs/gallops. No JVD  Respiratory: Lungs  decreased breath sounds  Gastrointestinal:  Soft, Non-tender, + BS  Lower Extremities: trace  edema  Right radial site with good pulse no ecchymosis no hematoma  Psychiatry: Patient is calm. No agitation.   Skin: warm and dry.      CURRENT MEDICATIONS:  MEDICATIONS  (STANDING):  apixaban 5 milliGRAM(s) Oral two times a day  aspirin enteric coated 81 milliGRAM(s) Oral daily  atorvastatin 40 milliGRAM(s) Oral at bedtime  clopidogrel Tablet 75 milliGRAM(s) Oral daily  losartan 50 milliGRAM(s) Oral daily  metoprolol succinate ER 50 milliGRAM(s) Oral daily    LABS:	 	                       14.1   11.73 )-----------( 216      ( 13 Feb 2024 05:07 )             42.7     02-13    142  |  100  |  24.7<H>  ----------------------------<  120<H>  3.6   |  27.0  |  1.64<H>    Ca    8.8      13 Feb 2024 05:07    TPro  7.1  /  Alb  3.4  /  TBili  2.7<H>  /  DBili  x   /  AST  22  /  ALT  15  /  AlkPhos  79  02-13    TSH: Thyroid Stimulating Hormone, Serum: 1.91 uIU/mL    TELEMETRY: ATrial fib with controlled ventricular rate    DIAGNOSTIC TESTING:  [ ] Echocardiogram: < from: TTE W or WO Ultrasound Enhancing Agent (02.10.24 @ 08:44) >   1. Moderate left ventricular hypertrophy.   2. Left ventricular cavity is normal. Left ventricular systolic function is normal with an ejection fraction visually estimated at 60 to 65 %. There are no regional wall motion abnormalities seen.   3. Elevated filling pressure. Analysis of left ventricular diastolic function and filling pressure is made challenging by the presence of atrial fibrillation.   4. Normal right ventricular cavity size, increasedwall thickness, and borderline reduced systolic function.   5. The left atrium is mildly dilated.   6. The right atrium is moderately dilated.   7. Mild to moderate mitral regurgitation.   8. Estimated pulmonary artery systolic pressure is 78 mmHg, severe pulmonary hypertension.   9. Mild to moderate tricuspid regurgitation.  10. Moderately dilated main pulmonary artery.  11. There is mild calcification of the mitral valve annulus.  12. No pericardial effusion seen.  13. Dilated pulmnonary artery, echodensity suggestive of pulmonary embolism.Recommend CTA.    Statin therapy. Low dose beta blockers for SVT.  INTERVENTIONAL IMPRESSIONS: Large RCA which wraps around apex. LAD is  intramyocardial and does not supply apex.  RCA with critical mid lesion. PCI with 1 DANO.  Mildly depressed LV function.

## 2024-02-13 NOTE — PROGRESS NOTE ADULT - NS ATTEND AMEND GEN_ALL_CORE FT
72 y/o M with new AF with RVR, severe PH with PASP 78 mmHg, AF with RVR. CTA negative for PE. S/p LHC/RHC with DANO to pLAD and elevated PCWP per prelim report (full report pending). Continue DAPT, statin, beta blocker. On diuresis with lasix. Net -2L overnight. Cr increased following LHC; will continue to monitor. AF rate controlled. CHADSVASC 3; patient was started on full dose AC with eliquis. Will consult EP to evaluate patient for JAMA/DCCV.

## 2024-02-14 ENCOUNTER — RESULT REVIEW (OUTPATIENT)
Age: 74
End: 2024-02-14

## 2024-02-14 LAB
ANION GAP SERPL CALC-SCNC: 13 MMOL/L — SIGNIFICANT CHANGE UP (ref 5–17)
BUN SERPL-MCNC: 47.8 MG/DL — HIGH (ref 8–20)
CALCIUM SERPL-MCNC: 8.9 MG/DL — SIGNIFICANT CHANGE UP (ref 8.4–10.5)
CHLORIDE SERPL-SCNC: 101 MMOL/L — SIGNIFICANT CHANGE UP (ref 96–108)
CO2 SERPL-SCNC: 25 MMOL/L — SIGNIFICANT CHANGE UP (ref 22–29)
CREAT SERPL-MCNC: 2.1 MG/DL — HIGH (ref 0.5–1.3)
EGFR: 33 ML/MIN/1.73M2 — LOW
GLUCOSE SERPL-MCNC: 114 MG/DL — HIGH (ref 70–99)
HCT VFR BLD CALC: 42 % — SIGNIFICANT CHANGE UP (ref 39–50)
HGB BLD-MCNC: 13.6 G/DL — SIGNIFICANT CHANGE UP (ref 13–17)
MCHC RBC-ENTMCNC: 31.2 PG — SIGNIFICANT CHANGE UP (ref 27–34)
MCHC RBC-ENTMCNC: 32.4 GM/DL — SIGNIFICANT CHANGE UP (ref 32–36)
MCV RBC AUTO: 96.3 FL — SIGNIFICANT CHANGE UP (ref 80–100)
PLATELET # BLD AUTO: 240 K/UL — SIGNIFICANT CHANGE UP (ref 150–400)
POTASSIUM SERPL-MCNC: 4.2 MMOL/L — SIGNIFICANT CHANGE UP (ref 3.5–5.3)
POTASSIUM SERPL-SCNC: 4.2 MMOL/L — SIGNIFICANT CHANGE UP (ref 3.5–5.3)
RBC # BLD: 4.36 M/UL — SIGNIFICANT CHANGE UP (ref 4.2–5.8)
RBC # FLD: 13.2 % — SIGNIFICANT CHANGE UP (ref 10.3–14.5)
SODIUM SERPL-SCNC: 139 MMOL/L — SIGNIFICANT CHANGE UP (ref 135–145)
WBC # BLD: 11.52 K/UL — HIGH (ref 3.8–10.5)
WBC # FLD AUTO: 11.52 K/UL — HIGH (ref 3.8–10.5)

## 2024-02-14 PROCEDURE — 99233 SBSQ HOSP IP/OBS HIGH 50: CPT | Mod: 25

## 2024-02-14 PROCEDURE — 99233 SBSQ HOSP IP/OBS HIGH 50: CPT

## 2024-02-14 PROCEDURE — 93325 DOPPLER ECHO COLOR FLOW MAPG: CPT | Mod: 26

## 2024-02-14 PROCEDURE — 93312 ECHO TRANSESOPHAGEAL: CPT | Mod: 26

## 2024-02-14 PROCEDURE — 93010 ELECTROCARDIOGRAM REPORT: CPT

## 2024-02-14 PROCEDURE — 99232 SBSQ HOSP IP/OBS MODERATE 35: CPT

## 2024-02-14 RX ORDER — METOPROLOL TARTRATE 50 MG
25 TABLET ORAL DAILY
Refills: 0 | Status: DISCONTINUED | OUTPATIENT
Start: 2024-02-14 | End: 2024-02-18

## 2024-02-14 RX ORDER — AMIODARONE HYDROCHLORIDE 400 MG/1
200 TABLET ORAL DAILY
Refills: 0 | Status: CANCELLED | OUTPATIENT
Start: 2024-02-28 | End: 2024-02-18

## 2024-02-14 RX ORDER — AMIODARONE HYDROCHLORIDE 400 MG/1
400 TABLET ORAL DAILY
Refills: 0 | Status: DISCONTINUED | OUTPATIENT
Start: 2024-02-14 | End: 2024-02-18

## 2024-02-14 RX ADMIN — ATORVASTATIN CALCIUM 40 MILLIGRAM(S): 80 TABLET, FILM COATED ORAL at 22:04

## 2024-02-14 RX ADMIN — CLOPIDOGREL BISULFATE 75 MILLIGRAM(S): 75 TABLET, FILM COATED ORAL at 14:24

## 2024-02-14 RX ADMIN — APIXABAN 5 MILLIGRAM(S): 2.5 TABLET, FILM COATED ORAL at 05:52

## 2024-02-14 RX ADMIN — APIXABAN 5 MILLIGRAM(S): 2.5 TABLET, FILM COATED ORAL at 17:09

## 2024-02-14 RX ADMIN — Medication 81 MILLIGRAM(S): at 14:24

## 2024-02-14 RX ADMIN — Medication 50 MILLIGRAM(S): at 05:53

## 2024-02-14 RX ADMIN — LOSARTAN POTASSIUM 50 MILLIGRAM(S): 100 TABLET, FILM COATED ORAL at 05:53

## 2024-02-14 RX ADMIN — AMIODARONE HYDROCHLORIDE 400 MILLIGRAM(S): 400 TABLET ORAL at 14:24

## 2024-02-14 RX ADMIN — Medication 3 MILLIGRAM(S): at 22:13

## 2024-02-14 NOTE — PROGRESS NOTE ADULT - SUBJECTIVE AND OBJECTIVE BOX
[facility]                                                         CARDIOLOGY AT [facility]                                                                  [address], [place] [address]                                                         Telephone: [phone]. Fax:[phone]                                                                             PROGRESS NOTE    Reason for follow up: new afib, HFpEF, CAD  Update: remains in afib      Review of symptoms:   Cardiac:  No chest pain. No dyspnea. No palpitations.  Respiratory: no cough. No dyspnea  Gastrointestinal: No diarrhea. No abdominal pain. No bleeding.   Neuro: No focal neuro complaints.    Vitals:  T(C): 36.8 (02-14-24 @ 07:27), Max: 37.1 (02-14-24 @ 00:50)  HR: 83 (02-14-24 @ 07:27) (83 - 90)  BP: 128/72 (02-14-24 @ 07:27) (100/66 - 128/72)  RR: 18 (02-14-24 @ 07:27) (17 - 18)  SpO2: 98% (02-14-24 @ 07:27) (92% - 98%)  Wt(kg): --  I&O's Summary    13 Feb 2024 07:01  -  14 Feb 2024 07:00  --------------------------------------------------------  IN: 580 mL / OUT: 700 mL / NET: -120 mL      Weight (kg): 115.7 (02-09 @ 11:57)    PHYSICAL EXAM:  Appearance: Comfortable. No acute distress  HEENT:  Atraumatic. Normocephalic.  Normal oral mucosa  Neurologic: A & O x 3, no gross focal deficits.  Cardiovascular: irregular S1 S2, No murmur, no rubs/gallops. No JVD  Respiratory: Lungs clear to auscultation, unlabored   Gastrointestinal:  Soft, Non-tender, + BS  Lower Extremities: 2+ Peripheral Pulses, No clubbing, cyanosis, or edema  Psychiatry: Patient is calm. No agitation.   Skin: warm and dry.    CURRENT CARDIAC MEDICATIONS:  losartan 50 milliGRAM(s) Oral daily  metoprolol succinate ER 50 milliGRAM(s) Oral daily      CURRENT OTHER MEDICATIONS:  acetaminophen     Tablet .. 650 milliGRAM(s) Oral every 6 hours PRN Temp greater or equal to 38C (100.4F), Mild Pain (1 - 3)  melatonin 3 milliGRAM(s) Oral at bedtime PRN Insomnia  ondansetron Injectable 4 milliGRAM(s) IV Push every 8 hours PRN Nausea and/or Vomiting  aluminum hydroxide/magnesium hydroxide/simethicone Suspension 30 milliLiter(s) Oral every 4 hours PRN Dyspepsia  atorvastatin 40 milliGRAM(s) Oral at bedtime  apixaban 5 milliGRAM(s) Oral two times a day  aspirin enteric coated 81 milliGRAM(s) Oral daily  clopidogrel Tablet 75 milliGRAM(s) Oral daily      LABS:	 	                            13.6   11.52 )-----------( 240      ( 14 Feb 2024 06:58 )             42.0     02-14    139  |  101  |  47.8<H>  ----------------------------<  114<H>  4.2   |  25.0  |  2.10<H>    Ca    8.9      14 Feb 2024 06:58    TPro  7.1  /  Alb  3.4  /  TBili  2.7<H>  /  DBili  x   /  AST  22  /  ALT  15  /  AlkPhos  79  02-13    PT/INR/PTT ( 12 Feb 2024 06:15 )                       :                       :      X            :       104.8                 .        .                   .              .           .       X           .                                       Lipid Profile:   HgA1c:   TSH: Thyroid Stimulating Hormone, Serum: 1.91 uIU/mL      TELEMETRY: afib      DIAGNOSTIC TESTING:  [ ] Echocardiogram:   < from: TTE W or WO Ultrasound Enhancing Agent (02.10.24 @ 08:44) >  CONCLUSIONS:      1. Moderate left ventricular hypertrophy.   2. Left ventricular cavity is normal. Left ventricular systolic function is normal with an ejection fraction visually estimated at 60 to 65 %. There are no regional wall motion abnormalities seen.   3. Elevated filling pressure. Analysis of left ventricular diastolic function and filling pressure is made challenging by the presence of atrial fibrillation.   4. Normal right ventricular cavity size, increasedwall thickness, and borderline reduced systolic function.   5. The left atrium is mildly dilated.   6. The right atrium is moderately dilated.   7. Mild to moderate mitral regurgitation.   8. Estimated pulmonary artery systolic pressure is 78 mmHg, severe pulmonary hypertension.   9. Mild to moderate tricuspid regurgitation.  10. Moderately dilated main pulmonary artery.  11. There is mild calcification of the mitral valve annulus.  12. No pericardial effusion seen.  13. Dilated pulmnonary artery, echodensity suggestive of pulmonary embolism.Recommend CTA.    < end of copied text >    [ ]  Catheterization:  POST LHC AND RHC  LHC DANO to the Prox LAD 2.75 x 22   RHC W-25  PA 70mean 44  PVR 3.76  PASP 50  C.O 4.7

## 2024-02-14 NOTE — PROGRESS NOTE ADULT - NS ATTEND AMEND GEN_ALL_CORE FT
agree with a/p as above  s/p jannie dccv for AF  cont amiodarone po  oac  follow with EP Dr Ruiz outpatient    I spent a total of 35 minutes on the encounter, including chart review, evaluating and discussing treatment options with the patient, as well as counseling and coordination as stated above.

## 2024-02-14 NOTE — PROGRESS NOTE ADULT - NS ATTEND AMEND GEN_ALL_CORE FT
74 y/o M with new AF with RVR, severe PH with PASP 78 mmHg, AF with RVR. CTA negative for PE. S/p LHC/RHC with DANO to pLAD and elevated PCWP per prelim report (full report pending). Continue DAPT, statin, beta blocker. Cr increasing following LHC; will continue to monitor. Lasix and losartan on hold pending improvement in kidney function. S/p JAMA/DCCV today with restoration of SR. Continue triple therapy x 1 month, then d/c ASA and continue plavix and eliquis.

## 2024-02-14 NOTE — PROGRESS NOTE ADULT - SUBJECTIVE AND OBJECTIVE BOX
Subjective:      EKG:  TELE:    MEDICATIONS  (STANDING):  apixaban 5 milliGRAM(s) Oral two times a day  aspirin enteric coated 81 milliGRAM(s) Oral daily  atorvastatin 40 milliGRAM(s) Oral at bedtime  clopidogrel Tablet 75 milliGRAM(s) Oral daily  metoprolol succinate ER 50 milliGRAM(s) Oral daily    MEDICATIONS  (PRN):  acetaminophen     Tablet .. 650 milliGRAM(s) Oral every 6 hours PRN Temp greater or equal to 38C (100.4F), Mild Pain (1 - 3)  aluminum hydroxide/magnesium hydroxide/simethicone Suspension 30 milliLiter(s) Oral every 4 hours PRN Dyspepsia  melatonin 3 milliGRAM(s) Oral at bedtime PRN Insomnia  ondansetron Injectable 4 milliGRAM(s) IV Push every 8 hours PRN Nausea and/or Vomiting      Allergies    No Known Allergies    Intolerances        Vital Signs Last 24 Hrs  T(C): 36.7 (14 Feb 2024 10:59), Max: 37.1 (14 Feb 2024 00:50)  T(F): 98.1 (14 Feb 2024 10:59), Max: 98.7 (14 Feb 2024 00:50)  HR: 68 (14 Feb 2024 12:05) (68 - 90)  BP: 91/52 (14 Feb 2024 12:05) (87/53 - 140/84)  BP(mean): --  RR: 16 (14 Feb 2024 12:05) (16 - 18)  SpO2: 96% (14 Feb 2024 12:05) (92% - 98%)    Parameters below as of 14 Feb 2024 12:05  Patient On (Oxygen Delivery Method): room air        Physical Exam:  Constitutional: NAD, AAOx3  Cardiovascular: +S1S2 RRR  Pulmonary: CTA b/l, unlabored  GI: soft NTND +BS  Extremities: no pedal edema, +distal pulses b/l  Neuro: non focal, ASCENCIO x4    LABS:                        13.6   11.52 )-----------( 240      ( 14 Feb 2024 06:58 )             42.0     02-14    139  |  101  |  47.8<H>  ----------------------------<  114<H>  4.2   |  25.0  |  2.10<H>    Ca    8.9      14 Feb 2024 06:58    TPro  7.1  /  Alb  3.4  /  TBili  2.7<H>  /  DBili  x   /  AST  22  /  ALT  15  /  AlkPhos  79  02-13      Urinalysis Basic - ( 14 Feb 2024 06:58 )    Color: x / Appearance: x / SG: x / pH: x  Gluc: 114 mg/dL / Ketone: x  / Bili: x / Urobili: x   Blood: x / Protein: x / Nitrite: x   Leuk Esterase: x / RBC: x / WBC x   Sq Epi: x / Non Sq Epi: x / Bacteria: x        RADIOLOGY & ADDITIONAL TESTS:    74 y/o male with CAD s/p PCI 1DES to RCA(2020) now s/p DANO to pLAD (2/12/24 [place]), HTN, HLD, chronic lymphedema, who was sent to [facility] ED on 2/9/24 by his primary cardiologist (Dr. Salas) after c/o of NO x 10 and noted to be in new onset AF with RVR but overall controlled VHR, who underwent s/p LHC with DANO to his pLAD and RHC with elevated PCWP (25), now s/p JAAM/DCCV (360J x 1) with successful restoration of sinus rhythm. At time of assessment pt reports significant improvement in SOB and offers no complaints. Denies chest pain, dizziness, fever, chills, SOB, recent or remote syncopal events.    Plan:  1) AF with controlled VR s/p JAMA DCCV (360J x 1)   - Start AAT with Amiodarone 400mg daily x 14 days, followed by 200ng daily (starting 2/29/24)   - Post Decrease Toprol to 25mg QD   - CHADSVASC = 4. c/w Eliquis 5mg Q12HR  - c/w Telemetry monitoring while admitted     2) CAD  - s/p stent to pLAD  - On Aspirin / Plavix  - Likely DC Aspirin in 1 month and c/w dual therapy with Eliquis and Plavix if ok with general cards  - Case discussed with Dr. Harden, no contraindication for DCCV following his recent PCI    3) HFpEF  - GDMT as per general cards    Discussed with Dr. Ruiz, further recommendations to follow    INCOMPLETE NOTE       Subjective/Objective:  S/p JAMA CV with successful restoration of sinus rhythm.  No complaints    EKG:  Sinus rhythm @ 69bpm, narrow QRS  TELE: Sinus rhythm    MEDICATIONS  (STANDING):  apixaban 5 milliGRAM(s) Oral two times a day  aspirin enteric coated 81 milliGRAM(s) Oral daily  atorvastatin 40 milliGRAM(s) Oral at bedtime  clopidogrel Tablet 75 milliGRAM(s) Oral daily  metoprolol succinate ER 50 milliGRAM(s) Oral daily    MEDICATIONS  (PRN):  acetaminophen     Tablet .. 650 milliGRAM(s) Oral every 6 hours PRN Temp greater or equal to 38C (100.4F), Mild Pain (1 - 3)  aluminum hydroxide/magnesium hydroxide/simethicone Suspension 30 milliLiter(s) Oral every 4 hours PRN Dyspepsia  melatonin 3 milliGRAM(s) Oral at bedtime PRN Insomnia  ondansetron Injectable 4 milliGRAM(s) IV Push every 8 hours PRN Nausea and/or Vomiting      Allergies  No Known Allergies    Vital Signs Last 24 Hrs  T(C): 36.7 (14 Feb 2024 10:59), Max: 37.1 (14 Feb 2024 00:50)  T(F): 98.1 (14 Feb 2024 10:59), Max: 98.7 (14 Feb 2024 00:50)  HR: 68 (14 Feb 2024 12:05) (68 - 90)  BP: 91/52 (14 Feb 2024 12:05) (87/53 - 140/84)  BP(mean): --  RR: 16 (14 Feb 2024 12:05) (16 - 18)  SpO2: 96% (14 Feb 2024 12:05) (92% - 98%)    Parameters below as of 14 Feb 2024 12:05  Patient On (Oxygen Delivery Method): room air    Physical Exam:  Constitutional: NAD, AAOx3  Cardiovascular: +S1S2 RRR  Pulmonary: CTA b/l, unlabored  GI: soft NTND +BS  Extremities: no pedal edema, +distal pulses b/l  Neuro: non focal, ASCENCIO x4    LABS:                        13.6   11.52 )-----------( 240      ( 14 Feb 2024 06:58 )             42.0     02-14    139  |  101  |  47.8<H>  ----------------------------<  114<H>  4.2   |  25.0  |  2.10<H>    Ca    8.9      14 Feb 2024 06:58    TPro  7.1  /  Alb  3.4  /  TBili  2.7<H>  /  DBili  x   /  AST  22  /  ALT  15  /  AlkPhos  79  02-13      Urinalysis Basic - ( 14 Feb 2024 06:58 )    Color: x / Appearance: x / SG: x / pH: x  Gluc: 114 mg/dL / Ketone: x  / Bili: x / Urobili: x   Blood: x / Protein: x / Nitrite: x   Leuk Esterase: x / RBC: x / WBC x   Sq Epi: x / Non Sq Epi: x / Bacteria: x        RADIOLOGY & ADDITIONAL TESTS:    72 y/o male with CAD s/p PCI 1DES to RCA(2020) now s/p DANO to pLAD (2/12/24 [place]), HTN, HLD, chronic lymphedema, who was sent to [facility] ED on 2/9/24 by his primary cardiologist (Dr. Salas) after c/o of NO x 10 and noted to be in new onset AF with RVR but overall controlled VHR, who underwent s/p LHC with DANO to his pLAD and RHC with elevated PCWP (25), now s/p JAMA/DCCV (360J x 1) with successful restoration of sinus rhythm. At time of assessment pt reports significant improvement in SOB and offers no complaints.    Plan:  1) AF with controlled VR s/p JAMA DCCV (360 x 1) with restoration of SR  - Start AAT with Amiodarone 400mg once daily x 14 days, followed by 200ng daily (starting 2/28/24)   - TFTs & LFTs should be monitored q 3-6 months while on amiodarone therapy. Anticipate <1 year of amiodarone therapy for this patient; however if > 1 year, patient will need annual fundoscopic exam and PFTs. Patient is advised of associated risks and need for monitoring; all questions answered to pt's expressed understanding.   - Decrease Toprol to 25mg QD   - CHADSVASC = 4. c/w Eliquis 5mg Q12HR  - c/w Telemetry monitoring while admitted   - No further inpatient EP recommendations.  EP will sign off  - Outpatient EP follow-up with Dr. Ruiz to consider long term rhythm control options and possible AF ablation.    2) CAD  - s/p stent to pLAD  - On Aspirin / Plavix  - Likely DC Aspirin in 1 month and c/w dual therapy with Eliquis and Plavix if ok with general cards    3) HFpEF  - GDMT as per general cards         Subjective/Objective:  S/p JAMA CV with successful restoration of sinus rhythm.  No complaints    EKG:  Sinus rhythm @ 69bpm, narrow QRS  TELE: Sinus rhythm    MEDICATIONS  (STANDING):  apixaban 5 milliGRAM(s) Oral two times a day  aspirin enteric coated 81 milliGRAM(s) Oral daily  atorvastatin 40 milliGRAM(s) Oral at bedtime  clopidogrel Tablet 75 milliGRAM(s) Oral daily  metoprolol succinate ER 50 milliGRAM(s) Oral daily    MEDICATIONS  (PRN):  acetaminophen     Tablet .. 650 milliGRAM(s) Oral every 6 hours PRN Temp greater or equal to 38C (100.4F), Mild Pain (1 - 3)  aluminum hydroxide/magnesium hydroxide/simethicone Suspension 30 milliLiter(s) Oral every 4 hours PRN Dyspepsia  melatonin 3 milliGRAM(s) Oral at bedtime PRN Insomnia  ondansetron Injectable 4 milliGRAM(s) IV Push every 8 hours PRN Nausea and/or Vomiting      Allergies  No Known Allergies    Vital Signs Last 24 Hrs  T(C): 36.7 (14 Feb 2024 10:59), Max: 37.1 (14 Feb 2024 00:50)  T(F): 98.1 (14 Feb 2024 10:59), Max: 98.7 (14 Feb 2024 00:50)  HR: 68 (14 Feb 2024 12:05) (68 - 90)  BP: 91/52 (14 Feb 2024 12:05) (87/53 - 140/84)  BP(mean): --  RR: 16 (14 Feb 2024 12:05) (16 - 18)  SpO2: 96% (14 Feb 2024 12:05) (92% - 98%)    Parameters below as of 14 Feb 2024 12:05  Patient On (Oxygen Delivery Method): room air    Physical Exam:  Constitutional: NAD, AAOx3  Cardiovascular: +S1S2 RRR  Pulmonary: CTA b/l, unlabored  GI: soft NTND +BS  Extremities: no pedal edema, +distal pulses b/l  Neuro: non focal, ASCENCIO x4    LABS:                        13.6   11.52 )-----------( 240      ( 14 Feb 2024 06:58 )             42.0     02-14    139  |  101  |  47.8<H>  ----------------------------<  114<H>  4.2   |  25.0  |  2.10<H>    Ca    8.9      14 Feb 2024 06:58    TPro  7.1  /  Alb  3.4  /  TBili  2.7<H>  /  DBili  x   /  AST  22  /  ALT  15  /  AlkPhos  79  02-13      Urinalysis Basic - ( 14 Feb 2024 06:58 )    Color: x / Appearance: x / SG: x / pH: x  Gluc: 114 mg/dL / Ketone: x  / Bili: x / Urobili: x   Blood: x / Protein: x / Nitrite: x   Leuk Esterase: x / RBC: x / WBC x   Sq Epi: x / Non Sq Epi: x / Bacteria: x        RADIOLOGY & ADDITIONAL TESTS:

## 2024-02-14 NOTE — PROGRESS NOTE ADULT - PROBLEM SELECTOR PLAN 2
-Elevated trops  -S/p DANO pLAD 2/12/24  -Remains on triple therapy. Per prior notes can be on dual therapy. Will clarify if triple therapy is needed for one month  -Having some JOAQUIM post cath -Elevated trops  -S/p DANO pLAD 2/12/24  -Remains on triple therapy. Triple therapy is needed for one month then stop ASA  -Having some JOAQUIM post cath

## 2024-02-14 NOTE — PROGRESS NOTE ADULT - SUBJECTIVE AND OBJECTIVE BOX
TAMRA PERKINS  ----------------------------------------  The patient was seen earlier at bedside. Patient with atrial fibrillation and pulmonary hypertension. Offered no complaints. Denied chest pain or palpitations.    Vital Signs Last 24 Hrs  T(C): 36.7 (14 Feb 2024 10:59), Max: 37.1 (14 Feb 2024 00:50)  T(F): 98.1 (14 Feb 2024 10:59), Max: 98.7 (14 Feb 2024 00:50)  HR: 75 (14 Feb 2024 12:55) (68 - 90)  BP: 120/61 (14 Feb 2024 12:55) (87/53 - 140/84)  BP(mean): --  RR: 16 (14 Feb 2024 12:55) (16 - 18)  SpO2: 96% (14 Feb 2024 12:55) (92% - 98%)    Parameters below as of 14 Feb 2024 12:55  Patient On (Oxygen Delivery Method): room air    PHYSICAL EXAMINATION:  ----------------------------------------  General appearance: No acute distress, Awake, Alert  HEENT: Normocephalic, Atraumatic, Conjunctiva clear, EOMI  Neck: Supple, No JVD, No tenderness  Lungs: Breath sound equal bilaterally, No wheezes, No rales  Cardiovascular: S1S2, Regular rhythm  Abdomen: Soft, Nontender, Nondistended, No guarding/rebound, Positive bowel sounds  Extremities: No clubbing, No cyanosis, No calf tenderness, Lower extremity edema improved  Neuro: Strength equal bilaterally, No tremors  Psychiatric: Appropriate mood, Normal affect    LABORATORY STUDIES:  ----------------------------------------             13.6   11.52 )-----------( 240      ( 14 Feb 2024 06:58 )             42.0     02-14    139  |  101  |  47.8<H>  ----------------------------<  114<H>  4.2   |  25.0  |  2.10<H>    Ca    8.9      14 Feb 2024 06:58    TPro  7.1  /  Alb  3.4  /  TBili  2.7<H>  /  DBili  x   /  AST  22  /  ALT  15  /  AlkPhos  79  02-13    LIVER FUNCTIONS - ( 13 Feb 2024 05:07 )  Alb: 3.4 g/dL / Pro: 7.1 g/dL / ALK PHOS: 79 U/L / ALT: 15 U/L / AST: 22 U/L / GGT: x           Urinalysis Basic - ( 14 Feb 2024 06:58 )  Color: x / Appearance: x / SG: x / pH: x  Gluc: 114 mg/dL / Ketone: x  / Bili: x / Urobili: x   Blood: x / Protein: x / Nitrite: x   Leuk Esterase: x / RBC: x / WBC x   Sq Epi: x / Non Sq Epi: x / Bacteria: x    MEDICATIONS  (STANDING):  aMIOdarone    Tablet 400 milliGRAM(s) Oral daily  apixaban 5 milliGRAM(s) Oral two times a day  aspirin enteric coated 81 milliGRAM(s) Oral daily  atorvastatin 40 milliGRAM(s) Oral at bedtime  clopidogrel Tablet 75 milliGRAM(s) Oral daily  metoprolol succinate ER 25 milliGRAM(s) Oral daily    MEDICATIONS  (PRN):  acetaminophen     Tablet .. 650 milliGRAM(s) Oral every 6 hours PRN Temp greater or equal to 38C (100.4F), Mild Pain (1 - 3)  aluminum hydroxide/magnesium hydroxide/simethicone Suspension 30 milliLiter(s) Oral every 4 hours PRN Dyspepsia  melatonin 3 milliGRAM(s) Oral at bedtime PRN Insomnia  ondansetron Injectable 4 milliGRAM(s) IV Push every 8 hours PRN Nausea and/or Vomiting      ASSESSMENT / PLAN:  ----------------------------------------  73M with a history of coronary artery disease, hypertension, hyperlipidemia, NSVT, and lymphedema who was referred to the hospital with findings of atrial fibrillation. Echocardiogram was notable for right heart strain but CT angiogram was without pulmonary embolism. The patient underwent cardiac catheterization with stent placement to the proximal LAD.    Atrial fibrillation  - Status post transesophageal echocardiogram and DC cardioversion  - On amiodarone and metprolol  - On apixaban for anticoagulation    Pulmonary hypertension  - Noted on echocardiogram  - CT angiogram was without pulmonary embolism  - To follow up with Pulmonary on an outpatient basis for further management    Coronary artery disease  - Status post stent placement to the proximal LAD  - On aspirin, atorvastatin, and metoprolol    Chronic kidney disease  - Monitoring renal function  - Creatinine level was elevated  - Furosemide held for now    Hypertension  - Close blood pressure monitoring  - On metoprolol  - Hydrochlorothiazide and furosemide held for now

## 2024-02-15 LAB
ANION GAP SERPL CALC-SCNC: 14 MMOL/L — SIGNIFICANT CHANGE UP (ref 5–17)
BUN SERPL-MCNC: 46.7 MG/DL — HIGH (ref 8–20)
CALCIUM SERPL-MCNC: 8.4 MG/DL — SIGNIFICANT CHANGE UP (ref 8.4–10.5)
CHLORIDE SERPL-SCNC: 101 MMOL/L — SIGNIFICANT CHANGE UP (ref 96–108)
CO2 SERPL-SCNC: 24 MMOL/L — SIGNIFICANT CHANGE UP (ref 22–29)
CREAT SERPL-MCNC: 1.87 MG/DL — HIGH (ref 0.5–1.3)
EGFR: 38 ML/MIN/1.73M2 — LOW
GLUCOSE SERPL-MCNC: 112 MG/DL — HIGH (ref 70–99)
HCT VFR BLD CALC: 38.7 % — LOW (ref 39–50)
HGB BLD-MCNC: 12.5 G/DL — LOW (ref 13–17)
MCHC RBC-ENTMCNC: 31.6 PG — SIGNIFICANT CHANGE UP (ref 27–34)
MCHC RBC-ENTMCNC: 32.3 GM/DL — SIGNIFICANT CHANGE UP (ref 32–36)
MCV RBC AUTO: 97.7 FL — SIGNIFICANT CHANGE UP (ref 80–100)
PLATELET # BLD AUTO: 232 K/UL — SIGNIFICANT CHANGE UP (ref 150–400)
POTASSIUM SERPL-MCNC: 4.4 MMOL/L — SIGNIFICANT CHANGE UP (ref 3.5–5.3)
POTASSIUM SERPL-SCNC: 4.4 MMOL/L — SIGNIFICANT CHANGE UP (ref 3.5–5.3)
RBC # BLD: 3.96 M/UL — LOW (ref 4.2–5.8)
RBC # FLD: 13.2 % — SIGNIFICANT CHANGE UP (ref 10.3–14.5)
SODIUM SERPL-SCNC: 139 MMOL/L — SIGNIFICANT CHANGE UP (ref 135–145)
WBC # BLD: 9.86 K/UL — SIGNIFICANT CHANGE UP (ref 3.8–10.5)
WBC # FLD AUTO: 9.86 K/UL — SIGNIFICANT CHANGE UP (ref 3.8–10.5)

## 2024-02-15 PROCEDURE — 99233 SBSQ HOSP IP/OBS HIGH 50: CPT

## 2024-02-15 RX ORDER — POLYETHYLENE GLYCOL 3350 17 G/17G
17 POWDER, FOR SOLUTION ORAL DAILY
Refills: 0 | Status: DISCONTINUED | OUTPATIENT
Start: 2024-02-15 | End: 2024-02-18

## 2024-02-15 RX ADMIN — CLOPIDOGREL BISULFATE 75 MILLIGRAM(S): 75 TABLET, FILM COATED ORAL at 11:51

## 2024-02-15 RX ADMIN — ATORVASTATIN CALCIUM 40 MILLIGRAM(S): 80 TABLET, FILM COATED ORAL at 22:54

## 2024-02-15 RX ADMIN — APIXABAN 5 MILLIGRAM(S): 2.5 TABLET, FILM COATED ORAL at 06:43

## 2024-02-15 RX ADMIN — AMIODARONE HYDROCHLORIDE 400 MILLIGRAM(S): 400 TABLET ORAL at 06:45

## 2024-02-15 RX ADMIN — Medication 25 MILLIGRAM(S): at 06:46

## 2024-02-15 RX ADMIN — POLYETHYLENE GLYCOL 3350 17 GRAM(S): 17 POWDER, FOR SOLUTION ORAL at 13:20

## 2024-02-15 RX ADMIN — Medication 81 MILLIGRAM(S): at 11:51

## 2024-02-15 RX ADMIN — APIXABAN 5 MILLIGRAM(S): 2.5 TABLET, FILM COATED ORAL at 17:15

## 2024-02-15 RX ADMIN — Medication 3 MILLIGRAM(S): at 22:55

## 2024-02-15 NOTE — PROGRESS NOTE ADULT - PROBLEM SELECTOR PLAN 1
- new onset atrial fibrillation  - pt is asymptomatic  - rates are 80s but at times as high as 170 for brief 2 min segments  - echo shows severe RV dysfunction, troponin elevated. concern for PE.  - continue toprol to 50mg daily  - start eliquis 5mg BID, if pt has PE will need heparin infusion and PERT consult for mechanical thrombectomy vs catheter thrombolysis   - trend troponins
-Rate controlled  -TSH normal  -For DCCV today
- new onset atrial fibrillation  - pt is asymptomatic   - echo shows severe RV dysfunction, troponin elevated. concern for PE but CTA PE study was negative, unknown etiology behind RV dysfunction  - plan for RHC w/ vasoreactivity study in AM, keep NPO @ MN   - continue toprol to 50mg daily  - stop eliquis, transition to heparin infusion full AC protocol no initial bolus.
New afib - afib continued  Ct IV heparin - on hold when cath lab cath  Ct lopressor 50mg po daily
-Rate controlled  -TSH normal  -S/p DCCV, remains in NSR. C/w amio
Eliquis 5mg bid  Toprol xl 50mg qd  consider JAMA and cardioversion

## 2024-02-15 NOTE — PROGRESS NOTE ADULT - PROBLEM SELECTOR PLAN 3
HX PCI 1DES rca,  Ct aspirin and statin  patient for right and left heart cath
-Appears euvolemic   -PCWP per interventional note was 25  -BNP 3K  -Still holding losartan / lasix 2/2 JOAQUIM, to resume once resolves.
-Appears euvolemic   -PCWP per interventional note was 25  -BNP 3K  -Will hold losartan / lasix for JOAQUIM, to resume once JOAQUIM resolves
PCWP 25 on right heart cath  Low na diet discussed with patient  Creat 1.2 - 1.7 after contrast  Hold Gdmt for now  Was given Lasix 40mg qd  IN: 120 mL / OUT: 1970 mL / NET: -1850 mL    Cardiac meds  apixaban 5 milliGRAM(s) Oral two times a day  aspirin enteric coated 81 milliGRAM(s) Oral daily  atorvastatin 40 milliGRAM(s) Oral at bedtime  clopidogrel Tablet 75 milliGRAM(s) Oral daily  losartan 50 milliGRAM(s) Oral daily  metoprolol succinate ER 50 milliGRAM(s) Oral daily

## 2024-02-15 NOTE — PHYSICAL THERAPY INITIAL EVALUATION ADULT - ADDITIONAL COMMENTS
Pt reports living alone in an apartment. 3 steps to enter with single rail. Independent at baseline, no device. Denies owning DME.

## 2024-02-15 NOTE — PROGRESS NOTE ADULT - PROBLEM SELECTOR PLAN 2
-Elevated trops  -S/p DANO pLAD 2/12/24  -Remains on triple therapy. Triple therapy is needed for one month then stop ASA  -Having some JOAQUIM post cath, improving

## 2024-02-15 NOTE — PHYSICAL THERAPY INITIAL EVALUATION ADULT - ACTIVE RANGE OF MOTION EXAMINATION, REHAB EVAL
stated
bilateral upper extremity Active ROM was WFL (within functional limits)/bilateral  lower extremity Active ROM was WFL (within functional limits)

## 2024-02-15 NOTE — PROGRESS NOTE ADULT - SUBJECTIVE AND OBJECTIVE BOX
TAMRA PERKINS  ----------------------------------------  The patient was seen earlier at bedside. Patient with atrial fibrillation. Offered no complaints.    Vital Signs Last 24 Hrs  T(C): 37.1 (15 Feb 2024 07:30), Max: 37.3 (14 Feb 2024 23:55)  T(F): 98.8 (15 Feb 2024 07:30), Max: 99.1 (14 Feb 2024 23:55)  HR: 72 (15 Feb 2024 07:30) (72 - 84)  BP: 125/69 (15 Feb 2024 07:30) (118/65 - 130/70)  BP(mean): --  RR: 18 (15 Feb 2024 07:30) (16 - 18)  SpO2: 95% (15 Feb 2024 07:30) (93% - 96%)    Parameters below as of 15 Feb 2024 07:30  Patient On (Oxygen Delivery Method): room air    PHYSICAL EXAMINATION:  ----------------------------------------  General appearance: No acute distress, Awake, Alert  HEENT: Normocephalic, Atraumatic, Conjunctiva clear, EOMI  Neck: Supple, No JVD, No tenderness  Lungs: Breath sound equal bilaterally, No wheezes, No rales  Cardiovascular: S1S2, Regular rhythm  Abdomen: Soft, Nontender, Nondistended, No guarding/rebound, Positive bowel sounds  Extremities: No clubbing, No cyanosis, No calf tenderness, Lower extremity edema improved  Neuro: Strength equal bilaterally, No tremors  Psychiatric: Appropriate mood, Normal affect    LABORATORY STUDIES:  ----------------------------------------             12.5   9.86  )-----------( 232      ( 15 Feb 2024 05:55 )             38.7     02-15    139  |  101  |  46.7<H>  ----------------------------<  112<H>  4.4   |  24.0  |  1.87<H>    Ca    8.4      15 Feb 2024 05:55    Urinalysis Basic - ( 15 Feb 2024 05:55 )  Color: x / Appearance: x / SG: x / pH: x  Gluc: 112 mg/dL / Ketone: x  / Bili: x / Urobili: x   Blood: x / Protein: x / Nitrite: x   Leuk Esterase: x / RBC: x / WBC x   Sq Epi: x / Non Sq Epi: x / Bacteria: x    MEDICATIONS  (STANDING):  aMIOdarone    Tablet 400 milliGRAM(s) Oral daily  apixaban 5 milliGRAM(s) Oral two times a day  aspirin enteric coated 81 milliGRAM(s) Oral daily  atorvastatin 40 milliGRAM(s) Oral at bedtime  clopidogrel Tablet 75 milliGRAM(s) Oral daily  metoprolol succinate ER 25 milliGRAM(s) Oral daily  polyethylene glycol 3350 17 Gram(s) Oral daily    MEDICATIONS  (PRN):  acetaminophen     Tablet .. 650 milliGRAM(s) Oral every 6 hours PRN Temp greater or equal to 38C (100.4F), Mild Pain (1 - 3)  aluminum hydroxide/magnesium hydroxide/simethicone Suspension 30 milliLiter(s) Oral every 4 hours PRN Dyspepsia  melatonin 3 milliGRAM(s) Oral at bedtime PRN Insomnia  ondansetron Injectable 4 milliGRAM(s) IV Push every 8 hours PRN Nausea and/or Vomiting      ASSESSMENT / PLAN:  ----------------------------------------  73M with a history of coronary artery disease, hypertension, hyperlipidemia, NSVT, and lymphedema who was referred to the hospital with findings of atrial fibrillation. Echocardiogram was notable for right heart strain but CT angiogram was without pulmonary embolism. The patient underwent cardiac catheterization with stent placement to the proximal LAD. He subsequently underwent DC cardioversion for atrial fibrillation.    Atrial fibrillation  - Status post transesophageal echocardiogram and DC cardioversion  - On amiodarone and metprolol  - On apixaban for anticoagulation    Pulmonary hypertension  - Noted on echocardiogram  - CT angiogram was without pulmonary embolism  - To follow up with Pulmonary on an outpatient basis for further management    Coronary artery disease  - Status post stent placement to the proximal LAD  - On aspirin, atorvastatin, and metoprolol    Chronic kidney disease III with acute kidney injury  - Monitoring renal function  - Creatinine level was elevated after catheterization  - Improved on repeat studies  - Furosemide to be held for now    Hypertension  - Close blood pressure monitoring  - On metoprolol  - Hydrochlorothiazide and furosemide held for now    Evaluated by Physical Therapy, thought to benefit from further treatment at a rehabilitation facility

## 2024-02-15 NOTE — PROGRESS NOTE ADULT - SUBJECTIVE AND OBJECTIVE BOX
[facility] PHYSICIAN [facility]                                                         CARDIOLOGY AT [facility]                                                                  [address], [place] [address]                                                         Telephone: [phone]. Fax:[phone]                                                                             PROGRESS NOTE    Reason for follow up: new afib, HFpEF, CAD with DANO pLAD, JOAQUIM  Update: JOAQUIM improving       Review of symptoms:   Cardiac:  No chest pain. No dyspnea. No palpitations.  Respiratory: no cough. No dyspnea  Gastrointestinal: No diarrhea. No abdominal pain. No bleeding.   Neuro: No focal neuro complaints.    Vitals:  T(C): 37.1 (02-15-24 @ 07:30), Max: 37.3 (02-14-24 @ 23:55)  HR: 72 (02-15-24 @ 07:30) (68 - 86)  BP: 125/69 (02-15-24 @ 07:30) (87/53 - 140/84)  RR: 18 (02-15-24 @ 07:30) (16 - 18)  SpO2: 95% (02-15-24 @ 07:30) (93% - 98%)  Wt(kg): --  I&O's Summary    14 Feb 2024 07:01  -  15 Feb 2024 07:00  --------------------------------------------------------  IN: 0 mL / OUT: 750 mL / NET: -750 mL          PHYSICAL EXAM:  Appearance: Comfortable. No acute distress  HEENT:  Atraumatic. Normocephalic.  Normal oral mucosa  Neurologic: A & O x 3, no gross focal deficits.  Cardiovascular: RRR S1 S2, No murmur, no rubs/gallops. No JVD  Respiratory: Lungs clear to auscultation, unlabored   Gastrointestinal:  Soft, Non-tender, + BS  Lower Extremities: 2+ Peripheral Pulses, No clubbing, cyanosis, or edema  Psychiatry: Patient is calm. No agitation.   Skin: warm and dry.    CURRENT CARDIAC MEDICATIONS:  aMIOdarone    Tablet 400 milliGRAM(s) Oral daily  metoprolol succinate ER 25 milliGRAM(s) Oral daily      CURRENT OTHER MEDICATIONS:  acetaminophen     Tablet .. 650 milliGRAM(s) Oral every 6 hours PRN Temp greater or equal to 38C (100.4F), Mild Pain (1 - 3)  melatonin 3 milliGRAM(s) Oral at bedtime PRN Insomnia  ondansetron Injectable 4 milliGRAM(s) IV Push every 8 hours PRN Nausea and/or Vomiting  aluminum hydroxide/magnesium hydroxide/simethicone Suspension 30 milliLiter(s) Oral every 4 hours PRN Dyspepsia  atorvastatin 40 milliGRAM(s) Oral at bedtime  apixaban 5 milliGRAM(s) Oral two times a day  aspirin enteric coated 81 milliGRAM(s) Oral daily  clopidogrel Tablet 75 milliGRAM(s) Oral daily      LABS:	 	                            12.5   9.86  )-----------( 232      ( 15 Feb 2024 05:55 )             38.7     02-15    139  |  101  |  46.7<H>  ----------------------------<  112<H>  4.4   |  24.0  |  1.87<H>    Ca    8.4      15 Feb 2024 05:55      PT/INR/PTT ( 12 Feb 2024 06:15 )                       :                       :      X            :       104.8                 .        .                   .              .           .       X           .                                       Lipid Profile:   HgA1c:   TSH: Thyroid Stimulating Hormone, Serum: 1.91 uIU/mL      TELEMETRY: SR      DIAGNOSTIC TESTING:  [ ] Echocardiogram:   < from: TTE W or WO Ultrasound Enhancing Agent (02.10.24 @ 08:44) >  CONCLUSIONS:      1. Moderate left ventricular hypertrophy.   2. Left ventricular cavity is normal. Left ventricular systolic function is normal with an ejection fraction visually estimated at 60 to 65 %. There are no regional wall motion abnormalities seen.   3. Elevated filling pressure. Analysis of left ventricular diastolic function and filling pressure is made challenging by the presence of atrial fibrillation.   4. Normal right ventricular cavity size, increasedwall thickness, and borderline reduced systolic function.   5. The left atrium is mildly dilated.   6. The right atrium is moderately dilated.   7. Mild to moderate mitral regurgitation.   8. Estimated pulmonary artery systolic pressure is 78 mmHg, severe pulmonary hypertension.   9. Mild to moderate tricuspid regurgitation.  10. Moderately dilated main pulmonary artery.  11. There is mild calcification of the mitral valve annulus.  12. No pericardial effusion seen.  13. Dilated pulmnonary artery, echodensity suggestive of pulmonary embolism.Recommend CTA.    < end of copied text >    [ ]  Catheterization:  POST LHC AND RHC  LHC DANO to the Prox LAD 2.75 x 22   RHC W-25  PA 70mean 44  PVR 3.76  PASP 50  C.O 4.7

## 2024-02-15 NOTE — PROGRESS NOTE ADULT - PROBLEM SELECTOR PROBLEM 1
Atrial fibrillation, new onset

## 2024-02-15 NOTE — PROGRESS NOTE ADULT - PROBLEM SELECTOR PROBLEM 2
CAD (coronary artery disease)
Severe pulmonary hypertension
CAD (coronary artery disease)
CAD (coronary artery disease)
Severe pulmonary hypertension

## 2024-02-15 NOTE — PROGRESS NOTE ADULT - NS ATTEND AMEND GEN_ALL_CORE FT
72 y/o M with new AF with RVR, severe PH with PASP 78 mmHg, AF with RVR. CTA negative for PE. S/p LHC/RHC with DANO to pLAD and elevated PCWP per prelim report (full report pending). Continue DAPT, statin, beta blocker. Cr increased following LHC; now improving. S/p JAMA/DCCV today with restoration of SR. Continue triple therapy x 1 month, then d/c ASA and continue plavix and eliquis. Lasix and losartan on hold due to JOAQUIM; start lasix 20mg po daily on d/c. Restart losartan when kidney function normalizes.    No further inpatient cardiac workup planned   Pending discharge to acute rehab   Please reconsult cardiology with any further questions 72 y/o M with new AF with RVR, severe PH with PASP 78 mmHg, AF with RVR. CTA negative for PE. S/p LHC/RHC with DANO to pLAD and elevated PCWP per prelim report (full report pending). Continue DAPT, statin, beta blocker. Cr increased following LHC; now improving. S/p JAMA/DCCV today with restoration of SR. Continue triple therapy x 1 month, then d/c ASA and continue plavix and eliquis. Lasix and losartan on hold due to JOAQUIM; start lasix 20mg po daily on d/c. Restart losartan when kidney function normalizes. Repeat outpatient TTE once on maximal GDMT to reassess pulmonary pressures; outpatient referral to pulmonary HTN specialist if remains elevated.     No further inpatient cardiac workup planned   Pending discharge to acute rehab   Please reconsult cardiology with any further questions

## 2024-02-16 ENCOUNTER — TRANSCRIPTION ENCOUNTER (OUTPATIENT)
Age: 74
End: 2024-02-16

## 2024-02-16 LAB
ANION GAP SERPL CALC-SCNC: 12 MMOL/L — SIGNIFICANT CHANGE UP (ref 5–17)
BUN SERPL-MCNC: 36.1 MG/DL — HIGH (ref 8–20)
CALCIUM SERPL-MCNC: 8.8 MG/DL — SIGNIFICANT CHANGE UP (ref 8.4–10.5)
CHLORIDE SERPL-SCNC: 102 MMOL/L — SIGNIFICANT CHANGE UP (ref 96–108)
CO2 SERPL-SCNC: 25 MMOL/L — SIGNIFICANT CHANGE UP (ref 22–29)
CREAT SERPL-MCNC: 1.4 MG/DL — HIGH (ref 0.5–1.3)
EGFR: 53 ML/MIN/1.73M2 — LOW
GLUCOSE SERPL-MCNC: 118 MG/DL — HIGH (ref 70–99)
HCT VFR BLD CALC: 38.7 % — LOW (ref 39–50)
HGB BLD-MCNC: 12.8 G/DL — LOW (ref 13–17)
MCHC RBC-ENTMCNC: 31.9 PG — SIGNIFICANT CHANGE UP (ref 27–34)
MCHC RBC-ENTMCNC: 33.1 GM/DL — SIGNIFICANT CHANGE UP (ref 32–36)
MCV RBC AUTO: 96.5 FL — SIGNIFICANT CHANGE UP (ref 80–100)
PLATELET # BLD AUTO: 248 K/UL — SIGNIFICANT CHANGE UP (ref 150–400)
POTASSIUM SERPL-MCNC: 4.2 MMOL/L — SIGNIFICANT CHANGE UP (ref 3.5–5.3)
POTASSIUM SERPL-SCNC: 4.2 MMOL/L — SIGNIFICANT CHANGE UP (ref 3.5–5.3)
RBC # BLD: 4.01 M/UL — LOW (ref 4.2–5.8)
RBC # FLD: 13.2 % — SIGNIFICANT CHANGE UP (ref 10.3–14.5)
SODIUM SERPL-SCNC: 139 MMOL/L — SIGNIFICANT CHANGE UP (ref 135–145)
WBC # BLD: 8.79 K/UL — SIGNIFICANT CHANGE UP (ref 3.8–10.5)
WBC # FLD AUTO: 8.79 K/UL — SIGNIFICANT CHANGE UP (ref 3.8–10.5)

## 2024-02-16 PROCEDURE — 99232 SBSQ HOSP IP/OBS MODERATE 35: CPT

## 2024-02-16 RX ORDER — LOSARTAN POTASSIUM 100 MG/1
1 TABLET, FILM COATED ORAL
Refills: 0 | DISCHARGE

## 2024-02-16 RX ORDER — LACTULOSE 10 G/15ML
20 SOLUTION ORAL ONCE
Refills: 0 | Status: DISCONTINUED | OUTPATIENT
Start: 2024-02-16 | End: 2024-02-18

## 2024-02-16 RX ORDER — POLYETHYLENE GLYCOL 3350 17 G/17G
17 POWDER, FOR SOLUTION ORAL
Qty: 0 | Refills: 0 | DISCHARGE
Start: 2024-02-16

## 2024-02-16 RX ORDER — ASPIRIN/CALCIUM CARB/MAGNESIUM 324 MG
1 TABLET ORAL
Qty: 0 | Refills: 0 | DISCHARGE
Start: 2024-02-16

## 2024-02-16 RX ORDER — AMIODARONE HYDROCHLORIDE 400 MG/1
1 TABLET ORAL
Qty: 0 | Refills: 0 | DISCHARGE
Start: 2024-02-16

## 2024-02-16 RX ORDER — METOPROLOL TARTRATE 50 MG
1 TABLET ORAL
Qty: 0 | Refills: 0 | DISCHARGE
Start: 2024-02-16

## 2024-02-16 RX ORDER — APIXABAN 2.5 MG/1
1 TABLET, FILM COATED ORAL
Qty: 0 | Refills: 0 | DISCHARGE
Start: 2024-02-16

## 2024-02-16 RX ORDER — AMIODARONE HYDROCHLORIDE 400 MG/1
2 TABLET ORAL
Qty: 0 | Refills: 0 | DISCHARGE
Start: 2024-02-16

## 2024-02-16 RX ORDER — HYDROCHLOROTHIAZIDE 25 MG
1 TABLET ORAL
Refills: 0 | DISCHARGE

## 2024-02-16 RX ORDER — CLOPIDOGREL BISULFATE 75 MG/1
1 TABLET, FILM COATED ORAL
Qty: 0 | Refills: 0 | DISCHARGE
Start: 2024-02-16

## 2024-02-16 RX ADMIN — AMIODARONE HYDROCHLORIDE 400 MILLIGRAM(S): 400 TABLET ORAL at 05:47

## 2024-02-16 RX ADMIN — Medication 25 MILLIGRAM(S): at 05:48

## 2024-02-16 RX ADMIN — APIXABAN 5 MILLIGRAM(S): 2.5 TABLET, FILM COATED ORAL at 05:47

## 2024-02-16 RX ADMIN — POLYETHYLENE GLYCOL 3350 17 GRAM(S): 17 POWDER, FOR SOLUTION ORAL at 12:37

## 2024-02-16 RX ADMIN — Medication 3 MILLIGRAM(S): at 21:34

## 2024-02-16 RX ADMIN — Medication 81 MILLIGRAM(S): at 12:37

## 2024-02-16 RX ADMIN — APIXABAN 5 MILLIGRAM(S): 2.5 TABLET, FILM COATED ORAL at 17:16

## 2024-02-16 RX ADMIN — ATORVASTATIN CALCIUM 40 MILLIGRAM(S): 80 TABLET, FILM COATED ORAL at 21:34

## 2024-02-16 RX ADMIN — CLOPIDOGREL BISULFATE 75 MILLIGRAM(S): 75 TABLET, FILM COATED ORAL at 12:37

## 2024-02-16 NOTE — PATIENT PROFILE ADULT - FALL HARM RISK - CONCLUSION
Pt presents to the ED via EMS coming from home c/o hypotension. EMS reports 60/30 at home, pt asymptomatic, no meds or fluids given en route. VSS upon arrival, pt has no complaints.  
Fall with Harm Risk

## 2024-02-16 NOTE — DISCHARGE NOTE PROVIDER - HOSPITAL COURSE
73M referred from the cardiologist office after noting atrial fibrillation with rapid ventricular rate. The patient had reported a one week history of increasing dyspnea and increasing edema. On presentation BNP(3168). The patient was evaluated by Cardiology for atrial fibrillation with initiation of apixaban and continuation of metoprolol. Echocardiogram noted moderate left ventricular hypertrophy, normal left ventricular systolic function with an ejection fraction of 60 to 65 %, increased right ventricular wall thickness and borderline reduced systolic function, moderately dilated right atrium, mild to moderate mitral regurgitation, severe pulmonary hypertension, mild to moderate tricuspid regurgitation, moderately dilated main pulmonary artery, no pericardial effusion. CT angiogram of the chest noted unchanged 3.5 mm left lower lobe nodule, no pulmonary arterial filling defects, no acute aortic dissection or other acute aortic pathology. The patient underwent cardiac catheterization with stent placement to the proximal LAD, and was noted to have an elevated wedge pressure. Furosemide was administered for diuresis. Lower extremity arterial doppler was without significant arterial disease. The patient underwent transesophageal echocardiogram which was without evidence of left atrial appendage thrombus. DC cardioversion was performed resulting in sinus rhythm. Repeat studies noted improvement in the renal function. The patient was evaluated by Physical Therapy and thought to benefit from further treatment at a rehabilitation facility.       35 minutes total time

## 2024-02-16 NOTE — DISCHARGE NOTE PROVIDER - NSDCFUSCHEDAPPT_GEN_ALL_CORE_FT
Román Singh  [facility] Physician [facility]  UROLOGY [address]  Scheduled Appointment: 05/14/2024

## 2024-02-16 NOTE — DISCHARGE NOTE PROVIDER - CARE PROVIDERS DIRECT ADDRESSES
[email or web address]

## 2024-02-16 NOTE — DISCHARGE NOTE PROVIDER - CARE PROVIDER_API CALL
Tony Ruiz  Cardiac Electrophysiology  [address] [place] [address]  Phone: [phone]  Fax: [phone]  Follow Up Time:     Jaye Bess  Cardiology  [address] [place] [address]  Phone: [phone]  Fax: [phone]  Follow Up Time:

## 2024-02-16 NOTE — DISCHARGE NOTE PROVIDER - NSDCCPCAREPLAN_GEN_ALL_CORE_FT
PRINCIPAL DISCHARGE DIAGNOSIS  Diagnosis: Atrial fibrillation  Assessment and Plan of Treatment: Continue on apixaban and amiodarone. Follow up with Electrophysiology.      SECONDARY DISCHARGE DIAGNOSES  Diagnosis: Coronary artery disease  Assessment and Plan of Treatment: Continue on aspirin and clopidpogrel. Follow up with Cardiologyf or further management and adjustement in medications as needed.    Diagnosis: JOAQUIM (acute kidney injury)  Assessment and Plan of Treatment: Kidney function improved. Follow up with your primary care physician for further management.

## 2024-02-16 NOTE — PATIENT PROFILE ADULT - FALL HARM RISK - HARM RISK INTERVENTIONS

## 2024-02-16 NOTE — PROGRESS NOTE ADULT - SUBJECTIVE AND OBJECTIVE BOX
TAMRA PERKINS  ----------------------------------------  The patient was seen earlier at bedside. Patient with atrial fibrillation. Offered no complaints.    Vital Signs Last 24 Hrs  T(C): 36.8 (16 Feb 2024 08:00), Max: 36.9 (15 Feb 2024 20:30)  T(F): 98.2 (16 Feb 2024 08:00), Max: 98.5 (15 Feb 2024 20:30)  HR: 72 (16 Feb 2024 08:00) (68 - 78)  BP: 123/74 (16 Feb 2024 08:00) (122/65 - 142/78)  BP(mean): --  RR: 18 (16 Feb 2024 08:00) (16 - 18)  SpO2: 96% (16 Feb 2024 08:00) (92% - 97%)    Parameters below as of 16 Feb 2024 08:00  Patient On (Oxygen Delivery Method): room air    PHYSICAL EXAMINATION:  ----------------------------------------  General appearance: No acute distress, Awake, Alert  HEENT: Normocephalic, Atraumatic, Conjunctiva clear, EOMI  Neck: Supple, No JVD, No tenderness  Lungs: Breath sound equal bilaterally, No wheezes, No rales  Cardiovascular: S1S2, Regular rhythm  Abdomen: Soft, Nontender, Nondistended, No guarding/rebound, Positive bowel sounds  Extremities: No clubbing, No cyanosis, No calf tenderness, Lower extremity edema improved  Neuro: Strength equal bilaterally, No tremors  Psychiatric: Appropriate mood, Normal affect    LABORATORY STUDIES:  ----------------------------------------             12.8   8.79  )-----------( 248      ( 16 Feb 2024 06:01 )             38.7     02-16    139  |  102  |  36.1<H>  ----------------------------<  118<H>  4.2   |  25.0  |  1.40<H>    Ca    8.8      16 Feb 2024 06:01    02-15-24 @ 07:01  -  02-16-24 @ 07:00  --------------------------------------------------------  IN: 0 mL / OUT: 900 mL / NET: -900 mL    Urinalysis Basic - ( 16 Feb 2024 06:01 )  Color: x / Appearance: x / SG: x / pH: x  Gluc: 118 mg/dL / Ketone: x  / Bili: x / Urobili: x   Blood: x / Protein: x / Nitrite: x   Leuk Esterase: x / RBC: x / WBC x   Sq Epi: x / Non Sq Epi: x / Bacteria: x    MEDICATIONS  (STANDING):  aMIOdarone    Tablet 400 milliGRAM(s) Oral daily  apixaban 5 milliGRAM(s) Oral two times a day  aspirin enteric coated 81 milliGRAM(s) Oral daily  atorvastatin 40 milliGRAM(s) Oral at bedtime  clopidogrel Tablet 75 milliGRAM(s) Oral daily  metoprolol succinate ER 25 milliGRAM(s) Oral daily  polyethylene glycol 3350 17 Gram(s) Oral daily    MEDICATIONS  (PRN):  acetaminophen     Tablet .. 650 milliGRAM(s) Oral every 6 hours PRN Temp greater or equal to 38C (100.4F), Mild Pain (1 - 3)  aluminum hydroxide/magnesium hydroxide/simethicone Suspension 30 milliLiter(s) Oral every 4 hours PRN Dyspepsia  lactulose Syrup 20 Gram(s) Oral once PRN constipation  melatonin 3 milliGRAM(s) Oral at bedtime PRN Insomnia  ondansetron Injectable 4 milliGRAM(s) IV Push every 8 hours PRN Nausea and/or Vomiting      ASSESSMENT / PLAN:  ----------------------------------------   TAMRA PERKINS  ----------------------------------------  The patient was seen earlier at bedside. Patient with atrial fibrillation. Offered no complaints.    Vital Signs Last 24 Hrs  T(C): 36.8 (16 Feb 2024 08:00), Max: 36.9 (15 Feb 2024 20:30)  T(F): 98.2 (16 Feb 2024 08:00), Max: 98.5 (15 Feb 2024 20:30)  HR: 72 (16 Feb 2024 08:00) (68 - 78)  BP: 123/74 (16 Feb 2024 08:00) (122/65 - 142/78)  BP(mean): --  RR: 18 (16 Feb 2024 08:00) (16 - 18)  SpO2: 96% (16 Feb 2024 08:00) (92% - 97%)    Parameters below as of 16 Feb 2024 08:00  Patient On (Oxygen Delivery Method): room air    PHYSICAL EXAMINATION:  ----------------------------------------  General appearance: No acute distress, Awake, Alert  HEENT: Normocephalic, Atraumatic, Conjunctiva clear, EOMI  Neck: Supple, No JVD, No tenderness  Lungs: Breath sound equal bilaterally, No wheezes, No rales  Cardiovascular: S1S2, Regular rhythm  Abdomen: Soft, Nontender, Nondistended, No guarding/rebound, Positive bowel sounds  Extremities: No clubbing, No cyanosis, No calf tenderness, Lower extremity edema improved  Neuro: Strength equal bilaterally, No tremors  Psychiatric: Appropriate mood, Normal affect    LABORATORY STUDIES:  ----------------------------------------             12.8   8.79  )-----------( 248      ( 16 Feb 2024 06:01 )             38.7     02-16    139  |  102  |  36.1<H>  ----------------------------<  118<H>  4.2   |  25.0  |  1.40<H>    Ca    8.8      16 Feb 2024 06:01    02-15-24 @ 07:01  -  02-16-24 @ 07:00  --------------------------------------------------------  IN: 0 mL / OUT: 900 mL / NET: -900 mL    Urinalysis Basic - ( 16 Feb 2024 06:01 )  Color: x / Appearance: x / SG: x / pH: x  Gluc: 118 mg/dL / Ketone: x  / Bili: x / Urobili: x   Blood: x / Protein: x / Nitrite: x   Leuk Esterase: x / RBC: x / WBC x   Sq Epi: x / Non Sq Epi: x / Bacteria: x    MEDICATIONS  (STANDING):  aMIOdarone    Tablet 400 milliGRAM(s) Oral daily  apixaban 5 milliGRAM(s) Oral two times a day  aspirin enteric coated 81 milliGRAM(s) Oral daily  atorvastatin 40 milliGRAM(s) Oral at bedtime  clopidogrel Tablet 75 milliGRAM(s) Oral daily  metoprolol succinate ER 25 milliGRAM(s) Oral daily  polyethylene glycol 3350 17 Gram(s) Oral daily    MEDICATIONS  (PRN):  acetaminophen     Tablet .. 650 milliGRAM(s) Oral every 6 hours PRN Temp greater or equal to 38C (100.4F), Mild Pain (1 - 3)  aluminum hydroxide/magnesium hydroxide/simethicone Suspension 30 milliLiter(s) Oral every 4 hours PRN Dyspepsia  lactulose Syrup 20 Gram(s) Oral once PRN constipation  melatonin 3 milliGRAM(s) Oral at bedtime PRN Insomnia  ondansetron Injectable 4 milliGRAM(s) IV Push every 8 hours PRN Nausea and/or Vomiting      ASSESSMENT / PLAN:  ----------------------------------------  73M with a history of coronary artery disease, hypertension, hyperlipidemia, NSVT, and lymphedema who was referred to the hospital with findings of atrial fibrillation. Echocardiogram was notable for right heart strain but CT angiogram was without pulmonary embolism. The patient underwent cardiac catheterization with stent placement to the proximal LAD. He subsequently underwent DC cardioversion for atrial fibrillation.    Atrial fibrillation  - Status post transesophageal echocardiogram and DC cardioversion  - On amiodarone and metprolol  - On apixaban for anticoagulation    Pulmonary hypertension  - Noted on echocardiogram  - CT angiogram was without pulmonary embolism  - To follow up with Pulmonary on an outpatient basis for further management    Coronary artery disease  - Status post stent placement to the proximal LAD  - On aspirin, atorvastatin, and metoprolol    Chronic kidney disease III with acute kidney injury  - Monitoring renal function  - Continues to improve  - Furosemide to be held for now    Hypertension  - Close blood pressure monitoring  - On metoprolol  - Hydrochlorothiazide and furosemide held for now    Evaluated by Physical Therapy, thought to benefit from further treatment at a rehabilitation facility

## 2024-02-16 NOTE — DISCHARGE NOTE PROVIDER - NSDCMRMEDTOKEN_GEN_ALL_CORE_FT
amiodarone 200 mg oral tablet: 2 tab(s) orally once a day through 2/28/24  amiodarone 200 mg oral tablet: 1 tab(s) orally once a day starting on 2/29/24  apixaban 5 mg oral tablet: 1 tab(s) orally 2 times a day  aspirin 81 mg oral delayed release tablet: 1 tab(s) orally once a day through 3/11/24  clopidogrel 75 mg oral tablet: 1 tab(s) orally once a day  Lipitor 40 mg oral tablet: 1 tab(s) orally once a day (at bedtime)  metoprolol succinate 25 mg oral tablet, extended release: 1 tab(s) orally once a day  polyethylene glycol 3350 oral powder for reconstitution: 17 gram(s) orally once a day

## 2024-02-17 LAB
HCT VFR BLD CALC: 38.3 % — LOW (ref 39–50)
HGB BLD-MCNC: 12.6 G/DL — LOW (ref 13–17)
MCHC RBC-ENTMCNC: 32 PG — SIGNIFICANT CHANGE UP (ref 27–34)
MCHC RBC-ENTMCNC: 32.9 GM/DL — SIGNIFICANT CHANGE UP (ref 32–36)
MCV RBC AUTO: 97.2 FL — SIGNIFICANT CHANGE UP (ref 80–100)
PLATELET # BLD AUTO: 265 K/UL — SIGNIFICANT CHANGE UP (ref 150–400)
RBC # BLD: 3.94 M/UL — LOW (ref 4.2–5.8)
RBC # FLD: 13.2 % — SIGNIFICANT CHANGE UP (ref 10.3–14.5)
WBC # BLD: 8.36 K/UL — SIGNIFICANT CHANGE UP (ref 3.8–10.5)
WBC # FLD AUTO: 8.36 K/UL — SIGNIFICANT CHANGE UP (ref 3.8–10.5)

## 2024-02-17 PROCEDURE — 99232 SBSQ HOSP IP/OBS MODERATE 35: CPT

## 2024-02-17 RX ADMIN — Medication 650 MILLIGRAM(S): at 22:39

## 2024-02-17 RX ADMIN — APIXABAN 5 MILLIGRAM(S): 2.5 TABLET, FILM COATED ORAL at 06:06

## 2024-02-17 RX ADMIN — Medication 650 MILLIGRAM(S): at 21:39

## 2024-02-17 RX ADMIN — APIXABAN 5 MILLIGRAM(S): 2.5 TABLET, FILM COATED ORAL at 18:13

## 2024-02-17 RX ADMIN — AMIODARONE HYDROCHLORIDE 400 MILLIGRAM(S): 400 TABLET ORAL at 06:06

## 2024-02-17 RX ADMIN — POLYETHYLENE GLYCOL 3350 17 GRAM(S): 17 POWDER, FOR SOLUTION ORAL at 11:56

## 2024-02-17 RX ADMIN — Medication 25 MILLIGRAM(S): at 06:06

## 2024-02-17 RX ADMIN — Medication 81 MILLIGRAM(S): at 11:56

## 2024-02-17 RX ADMIN — ATORVASTATIN CALCIUM 40 MILLIGRAM(S): 80 TABLET, FILM COATED ORAL at 21:30

## 2024-02-17 RX ADMIN — CLOPIDOGREL BISULFATE 75 MILLIGRAM(S): 75 TABLET, FILM COATED ORAL at 11:56

## 2024-02-17 NOTE — PROGRESS NOTE ADULT - ASSESSMENT
This is a 73/M, pmhx of CAD s/p PCI 1DES rca, htn, hld, svt, nsvt sent in from cardiologist ( dr. benedict) office for new onse Afib RVR. Reports some dyspnea on exertion since a week, progressively worsening these past couple of days with pedal edema.   
ASSESSMENT / PLAN:  ----------------------------------------  73M with a history of coronary artery disease, hypertension, hyperlipidemia, NSVT, and lymphedema who was referred to the hospital with findings of atrial fibrillation. Echocardiogram was notable for right heart strain but CT angiogram was without pulmonary embolism. The patient underwent cardiac catheterization with stent placement to the proximal LAD. He subsequently underwent DC cardioversion for atrial fibrillation.    Coronary artery disease  - Status post stent placement to the proximal LAD  - On aspirin, atorvastatin, and metoprolol    Atrial fibrillation with RVR  - Status post transesophageal echocardiogram and DC cardioversion  - On amiodarone and metoprolol  - On apixaban for anticoagulation    Pulmonary hypertension  - Noted on echocardiogram  - To follow up with Pulmonary on an outpatient basis for further management    Chronic kidney disease III with acute kidney injury  - Monitoring renal function  - Continues to improve  - hold lasix    Hypertension  - Close blood pressure monitoring  - On metoprolol  - holding hctz and lasix    Dispo  pending rehab placement
73/M, pmhx of CAD s/p PCI 1DES rca, htn, hld, svt, nsvt sent in from cardiologist ( dr. benedict) office for new onse Afib RVR. Reports some dyspnea on exertion since a week, progressively worsening these past couple of days with pedal edema. Now s/p DANO pLAD
73/M, pmhx of CAD s/p PCI 1DES rca, htn, hld, svt, nsvt sent in from cardiologist ( dr. benedict) office for new onse Afib RVR. Reports some dyspnea on exertion since a week, progressively worsening these past couple of days with pedal edema. Now s/p DANO pLAD
This is a 73/M, pmhx of CAD s/p PCI 1DES rca, htn, hld, svt, nsvt sent in from cardiologist ( dr. benedict) office for new onse Afib RVR. Reports some dyspnea on exertion since a week, progressively worsening these past couple of days with pedal edema.   
73/M, pmhx of CAD s/p PCI 1DES rca, htn, hld, svt, nsvt sent in from cardiologist ( dr. benedict) office for new onse Afib RVR. Reports some dyspnea on exertion since a week, progressively worsening these past couple of days with pedal edema. 
74 y/o male with PMH  of CAD s/p PCI 1DES RCA, HTN, HLD, SVt/NSVT referred to hospital for atrial fib with rapid thad rate and progressive dyspnea and pedal edema.  Echo EF 65% DD, Mild to moderated MR Severe Pul htn RSVP  78mmhg  Dilated Pul htn  CTA of chest negative for PE.  Referred for Cath and s/p PCI to LAD  RHC PCWP-25  PA 70 mean 44  PVR 3.76 PASP 50 C.O 4.7      
72 y/o male with CAD s/p PCI 1DES to RCA(2020) now s/p DANO to pLAD (2/12/24 Selim), HTN, HLD, chronic lymphedema, who was sent to [facility] ED on 2/9/24 by his primary cardiologist (Dr. Salas) after c/o of NO x 10 and noted to be in new onset AF with RVR but overall controlled VHR, who underwent s/p LHC with DANO to his pLAD and RHC with elevated PCWP (25), now s/p JAMA/DCCV (360J x 1) with successful restoration of sinus rhythm. At time of assessment pt reports significant improvement in SOB and offers no complaints.    Plan:  1) AF with controlled VR s/p JAMA DCCV (360 x 1) with restoration of SR  - Start AAT with Amiodarone 400mg once daily x 14 days, followed by 200ng daily (starting 2/28/24)   - TFTs & LFTs should be monitored q 3-6 months while on amiodarone therapy. Anticipate <1 year of amiodarone therapy for this patient; however if > 1 year, patient will need annual fundoscopic exam and PFTs. Patient is advised of associated risks and need for monitoring; all questions answered to pt's expressed understanding.   - Decrease Toprol to 25mg QD   - CHADSVASC = 4. c/w Eliquis 5mg Q12HR  - c/w Telemetry monitoring while admitted   - No further inpatient EP recommendations.  EP will sign off  - Outpatient EP follow-up with Dr. Ruiz to consider long term rhythm control options and possible AF ablation.    2) CAD  - s/p stent to pLAD  - On Aspirin / Plavix  - Likely DC Aspirin in 1 month and c/w dual therapy with Eliquis and Plavix if ok with general cards    3) HFpEF  - GDMT as per general cards

## 2024-02-17 NOTE — PROGRESS NOTE ADULT - SUBJECTIVE AND OBJECTIVE BOX
Reji Fontenot MD  [facility]  Contact via Teams or text/call at [phone]    Patient is a 73y old  Male who presents with a chief complaint of SOB/atrial fib (16 Feb 2024 09:33)    Feels okay.  Denies chest pain.      Patient seen and examined at bedside. No overnight events reported.     ALLERGIES:  No Known Allergies    MEDICATIONS  (STANDING):  aMIOdarone    Tablet 400 milliGRAM(s) Oral daily  apixaban 5 milliGRAM(s) Oral two times a day  aspirin enteric coated 81 milliGRAM(s) Oral daily  atorvastatin 40 milliGRAM(s) Oral at bedtime  clopidogrel Tablet 75 milliGRAM(s) Oral daily  metoprolol succinate ER 25 milliGRAM(s) Oral daily  polyethylene glycol 3350 17 Gram(s) Oral daily    MEDICATIONS  (PRN):  acetaminophen     Tablet .. 650 milliGRAM(s) Oral every 6 hours PRN Temp greater or equal to 38C (100.4F), Mild Pain (1 - 3)  aluminum hydroxide/magnesium hydroxide/simethicone Suspension 30 milliLiter(s) Oral every 4 hours PRN Dyspepsia  lactulose Syrup 20 Gram(s) Oral once PRN constipation  melatonin 3 milliGRAM(s) Oral at bedtime PRN Insomnia  ondansetron Injectable 4 milliGRAM(s) IV Push every 8 hours PRN Nausea and/or Vomiting    Vital Signs Last 24 Hrs  T(F): 98.2 (17 Feb 2024 08:19), Max: 98.6 (16 Feb 2024 21:28)  HR: 72 (17 Feb 2024 08:19) (69 - 73)  BP: 157/65 (17 Feb 2024 08:19) (135/72 - 157/65)  RR: 18 (17 Feb 2024 08:19) (16 - 18)  SpO2: 97% (17 Feb 2024 08:19) (92% - 97%)  I&O's Summary    16 Feb 2024 07:01  -  17 Feb 2024 07:00  --------------------------------------------------------  IN: 0 mL / OUT: 100 mL / NET: -100 mL      PHYSICAL EXAM:  General: NAD, A/O x 3  ENT: No gross hearing impairment, Moist mucous membranes, no thrush  Neck: Supple, No JVD  Lungs: Clear to auscultation bilaterally, good air entry, non-labored breathing  Cardio: RRR, S1/S2, No murmur  Abdomen: Soft, Nontender, Nondistended; Bowel sounds present  Extremities: No calf tenderness, No cyanosis, No pitting edema  Psych: Appropriate mood and affect    LABS:                        12.6   8.36  )-----------( 265      ( 17 Feb 2024 06:06 )             38.3     02-16    139  |  102  |  36.1  ----------------------------<  118  4.2   |  25.0  |  1.40    Ca    8.8      16 Feb 2024 06:01    Urinalysis Basic - ( 16 Feb 2024 06:01 )    Color: x / Appearance: x / SG: x / pH: x  Gluc: 118 mg/dL / Ketone: x  / Bili: x / Urobili: x   Blood: x / Protein: x / Nitrite: x   Leuk Esterase: x / RBC: x / WBC x   Sq Epi: x / Non Sq Epi: x / Bacteria: x    RADIOLOGY & ADDITIONAL TESTS:    Care Discussed with Consultants/Other Providers:

## 2024-02-18 ENCOUNTER — TRANSCRIPTION ENCOUNTER (OUTPATIENT)
Age: 74
End: 2024-02-18

## 2024-02-18 VITALS
HEART RATE: 74 BPM | OXYGEN SATURATION: 95 % | SYSTOLIC BLOOD PRESSURE: 162 MMHG | DIASTOLIC BLOOD PRESSURE: 82 MMHG | TEMPERATURE: 98 F | RESPIRATION RATE: 16 BRPM

## 2024-02-18 LAB
HCT VFR BLD CALC: 38.2 % — LOW (ref 39–50)
HGB BLD-MCNC: 12.5 G/DL — LOW (ref 13–17)
MCHC RBC-ENTMCNC: 31.8 PG — SIGNIFICANT CHANGE UP (ref 27–34)
MCHC RBC-ENTMCNC: 32.7 GM/DL — SIGNIFICANT CHANGE UP (ref 32–36)
MCV RBC AUTO: 97.2 FL — SIGNIFICANT CHANGE UP (ref 80–100)
PLATELET # BLD AUTO: 283 K/UL — SIGNIFICANT CHANGE UP (ref 150–400)
RBC # BLD: 3.93 M/UL — LOW (ref 4.2–5.8)
RBC # FLD: 13.2 % — SIGNIFICANT CHANGE UP (ref 10.3–14.5)
WBC # BLD: 8.57 K/UL — SIGNIFICANT CHANGE UP (ref 3.8–10.5)
WBC # FLD AUTO: 8.57 K/UL — SIGNIFICANT CHANGE UP (ref 3.8–10.5)

## 2024-02-18 PROCEDURE — 82803 BLOOD GASES ANY COMBINATION: CPT

## 2024-02-18 PROCEDURE — 85025 COMPLETE CBC W/AUTO DIFF WBC: CPT

## 2024-02-18 PROCEDURE — 99239 HOSP IP/OBS DSCHRG MGMT >30: CPT

## 2024-02-18 PROCEDURE — 82947 ASSAY GLUCOSE BLOOD QUANT: CPT

## 2024-02-18 PROCEDURE — 85610 PROTHROMBIN TIME: CPT

## 2024-02-18 PROCEDURE — C1753: CPT

## 2024-02-18 PROCEDURE — 85018 HEMOGLOBIN: CPT

## 2024-02-18 PROCEDURE — 86803 HEPATITIS C AB TEST: CPT

## 2024-02-18 PROCEDURE — 93312 ECHO TRANSESOPHAGEAL: CPT

## 2024-02-18 PROCEDURE — 83605 ASSAY OF LACTIC ACID: CPT

## 2024-02-18 PROCEDURE — 36415 COLL VENOUS BLD VENIPUNCTURE: CPT

## 2024-02-18 PROCEDURE — 93306 TTE W/DOPPLER COMPLETE: CPT

## 2024-02-18 PROCEDURE — 71275 CT ANGIOGRAPHY CHEST: CPT | Mod: MA

## 2024-02-18 PROCEDURE — 82330 ASSAY OF CALCIUM: CPT

## 2024-02-18 PROCEDURE — 85730 THROMBOPLASTIN TIME PARTIAL: CPT

## 2024-02-18 PROCEDURE — 93005 ELECTROCARDIOGRAM TRACING: CPT

## 2024-02-18 PROCEDURE — 84484 ASSAY OF TROPONIN QUANT: CPT

## 2024-02-18 PROCEDURE — C9600: CPT | Mod: LD

## 2024-02-18 PROCEDURE — 71046 X-RAY EXAM CHEST 2 VIEWS: CPT

## 2024-02-18 PROCEDURE — 93460 R&L HRT ART/VENTRICLE ANGIO: CPT | Mod: 59

## 2024-02-18 PROCEDURE — 84132 ASSAY OF SERUM POTASSIUM: CPT

## 2024-02-18 PROCEDURE — 84443 ASSAY THYROID STIM HORMONE: CPT

## 2024-02-18 PROCEDURE — 83735 ASSAY OF MAGNESIUM: CPT

## 2024-02-18 PROCEDURE — 85014 HEMATOCRIT: CPT

## 2024-02-18 PROCEDURE — C1725: CPT

## 2024-02-18 PROCEDURE — 85027 COMPLETE CBC AUTOMATED: CPT

## 2024-02-18 PROCEDURE — C1887: CPT

## 2024-02-18 PROCEDURE — C1874: CPT

## 2024-02-18 PROCEDURE — 93925 LOWER EXTREMITY STUDY: CPT

## 2024-02-18 PROCEDURE — 83880 ASSAY OF NATRIURETIC PEPTIDE: CPT

## 2024-02-18 PROCEDURE — 80053 COMPREHEN METABOLIC PANEL: CPT

## 2024-02-18 PROCEDURE — C1769: CPT

## 2024-02-18 PROCEDURE — 84295 ASSAY OF SERUM SODIUM: CPT

## 2024-02-18 PROCEDURE — 82435 ASSAY OF BLOOD CHLORIDE: CPT

## 2024-02-18 PROCEDURE — 0225U NFCT DS DNA&RNA 21 SARSCOV2: CPT

## 2024-02-18 PROCEDURE — 80048 BASIC METABOLIC PNL TOTAL CA: CPT

## 2024-02-18 PROCEDURE — 99285 EMERGENCY DEPT VISIT HI MDM: CPT

## 2024-02-18 PROCEDURE — C1894: CPT

## 2024-02-18 RX ADMIN — APIXABAN 5 MILLIGRAM(S): 2.5 TABLET, FILM COATED ORAL at 05:44

## 2024-02-18 RX ADMIN — AMIODARONE HYDROCHLORIDE 400 MILLIGRAM(S): 400 TABLET ORAL at 05:44

## 2024-02-18 RX ADMIN — Medication 25 MILLIGRAM(S): at 05:44

## 2024-02-18 RX ADMIN — CLOPIDOGREL BISULFATE 75 MILLIGRAM(S): 75 TABLET, FILM COATED ORAL at 08:39

## 2024-02-18 RX ADMIN — Medication 81 MILLIGRAM(S): at 08:38

## 2024-02-18 NOTE — PROGRESS NOTE ADULT - PROVIDER SPECIALTY LIST ADULT
Electrophysiology
Hospitalist
Intervent Cardiology
Cardiology
Hospitalist
Hospitalist
Cardiology

## 2024-02-18 NOTE — SBIRT NOTE ADULT - NSSBIRTAUDITSCORE_GEN_A_CORE_CAL
s/p ex lap for leak  has improved drastically  abd is soft and drains minimal was thinking about endoscopy but with minimal drainage all clear, improving patient thing best to use J tube and just keep decompressed  will get study at later date based on clinical information  will try to get volume off and has started to make urine  certainly degree of atn but seems to be improving  wbc is 10 k and can use j tube for nutriton  hgb 6.6 probably large degree dilutional and coincides with starting to make urine  plan is to extubate once fluid status improves  use j tube for nutrition  off all pressors  once extubated and improving will decide need and what study to obtain  at present no need for further intervention at this point 3

## 2024-02-18 NOTE — DISCHARGE NOTE NURSING/CASE MANAGEMENT/SOCIAL WORK - NSDCFUADDAPPT_GEN_ALL_CORE_FT
[facility], [address], [phone] [place] [id number]

## 2024-02-18 NOTE — DISCHARGE NOTE NURSING/CASE MANAGEMENT/SOCIAL WORK - PATIENT PORTAL LINK FT
You can access the FollowMyHealth Patient Portal offered by [facility] by registering at the following website: [email or web address]. By joining [facility]’s FollowMyHealth portal, you will also be able to view your health information using other applications (apps) compatible with our system.

## 2024-02-18 NOTE — PROGRESS NOTE ADULT - SUBJECTIVE AND OBJECTIVE BOX
TAMRA PERKINS  ----------------------------------------  The patient was seen earlier at bedside. Patient with atrial fibrillation. Sitting in the chair offered no complaints. Denied chest pain or palpitations.    Vital Signs Last 24 Hrs  T(C): 37.1 (18 Feb 2024 07:42), Max: 37.2 (17 Feb 2024 23:49)  T(F): 98.7 (18 Feb 2024 07:42), Max: 98.9 (17 Feb 2024 23:49)  HR: 67 (18 Feb 2024 07:42) (67 - 74)  BP: 146/76 (18 Feb 2024 07:42) (126/74 - 167/74)  BP(mean): --  RR: 18 (18 Feb 2024 07:42) (18 - 18)  SpO2: 91% (18 Feb 2024 07:42) (91% - 97%)    Parameters below as of 18 Feb 2024 07:42  Patient On (Oxygen Delivery Method): room air    PHYSICAL EXAMINATION:  ----------------------------------------  General appearance: No acute distress, Awake, Alert  HEENT: Normocephalic, Atraumatic, Conjunctiva clear, EOMI  Neck: Supple, No JVD, No tenderness  Lungs: Breath sound equal bilaterally, No wheezes, No rales  Cardiovascular: S1S2, Regular rhythm  Abdomen: Soft, Nontender, Nondistended, No guarding/rebound, Positive bowel sounds  Extremities: No clubbing, No cyanosis, No calf tenderness, Mild lower extremity edema  Neuro: Strength equal bilaterally, No tremors  Psychiatric: Appropriate mood, Normal affect    LABORATORY STUDIES:  ----------------------------------------             12.5   8.57  )-----------( 283      ( 18 Feb 2024 06:00 )             38.2     MEDICATIONS  (STANDING):  aMIOdarone    Tablet 400 milliGRAM(s) Oral daily  apixaban 5 milliGRAM(s) Oral two times a day  aspirin enteric coated 81 milliGRAM(s) Oral daily  atorvastatin 40 milliGRAM(s) Oral at bedtime  clopidogrel Tablet 75 milliGRAM(s) Oral daily  metoprolol succinate ER 25 milliGRAM(s) Oral daily  polyethylene glycol 3350 17 Gram(s) Oral daily    MEDICATIONS  (PRN):  acetaminophen     Tablet .. 650 milliGRAM(s) Oral every 6 hours PRN Temp greater or equal to 38C (100.4F), Mild Pain (1 - 3)  aluminum hydroxide/magnesium hydroxide/simethicone Suspension 30 milliLiter(s) Oral every 4 hours PRN Dyspepsia  lactulose Syrup 20 Gram(s) Oral once PRN constipation  melatonin 3 milliGRAM(s) Oral at bedtime PRN Insomnia  ondansetron Injectable 4 milliGRAM(s) IV Push every 8 hours PRN Nausea and/or Vomiting      ASSESSMENT / PLAN:  ----------------------------------------  73M with a history of coronary artery disease, hypertension, hyperlipidemia, NSVT, and lymphedema who was referred to the hospital with findings of atrial fibrillation. Echocardiogram was notable for right heart strain but CT angiogram was without pulmonary embolism. The patient underwent cardiac catheterization with stent placement to the proximal LAD. He subsequently underwent DC cardioversion for atrial fibrillation.    Coronary artery disease  - Status post stent placement to the proximal LAD  - Continue on aspirin, atorvastatin, and metoprolol    Atrial fibrillation with RVR  - Status post transesophageal echocardiogram and DC cardioversion  - On amiodarone and metoprolol  - On apixaban for anticoagulation    Pulmonary hypertension  - Noted on echocardiogram  - To follow up with Pulmonary on an outpatient basis for further management    Chronic kidney disease III with acute kidney injury  - Monitoring renal function  - Continues to improve  - Holding furosemide    Hypertension  - Close blood pressure monitoring  - On metoprolol

## 2024-02-18 NOTE — PROGRESS NOTE ADULT - REASON FOR ADMISSION
SOB/atrial fib
AF s/p JAMA/DCCV
SOB/atrial fib

## 2024-02-18 NOTE — DISCHARGE NOTE NURSING/CASE MANAGEMENT/SOCIAL WORK - NSDCPEFALRISK_GEN_ALL_CORE
For information on Fall & Injury Prevention, visit: [email or web address] OR  [email or web address] OR  https://www.cdc.gov/steadi/patient.html

## 2024-02-18 NOTE — DISCHARGE NOTE NURSING/CASE MANAGEMENT/SOCIAL WORK - NSTOBACCONEVERSMOKERY/N_GEN_A
Attempted to call pt. Sent [place] message as well. Small nodule noted on Xray, we need CT for further eval. Please get a good call back/time if I am not available to discuss. I can go ahead and put in the order, but would like to d/w pt. Yes

## 2024-05-06 ENCOUNTER — NON-APPOINTMENT (OUTPATIENT)
Age: 74
End: 2024-05-06

## 2024-05-07 ENCOUNTER — LABORATORY RESULT (OUTPATIENT)
Age: 74
End: 2024-05-07

## 2024-05-07 ENCOUNTER — APPOINTMENT (OUTPATIENT)
Dept: PULMONOLOGY | Facility: CLINIC | Age: 74
End: 2024-05-07
Payer: MEDICARE

## 2024-05-07 VITALS
TEMPERATURE: 98 F | HEART RATE: 71 BPM | BODY MASS INDEX: 36.36 KG/M2 | OXYGEN SATURATION: 90 % | RESPIRATION RATE: 15 BRPM | WEIGHT: 254 LBS | HEIGHT: 70 IN | DIASTOLIC BLOOD PRESSURE: 71 MMHG | SYSTOLIC BLOOD PRESSURE: 154 MMHG

## 2024-05-07 DIAGNOSIS — I27.21 SECONDARY PULMONARY ARTERIAL HYPERTENSION: ICD-10-CM

## 2024-05-07 PROCEDURE — 36415 COLL VENOUS BLD VENIPUNCTURE: CPT

## 2024-05-07 PROCEDURE — 94664 DEMO&/EVAL PT USE INHALER: CPT

## 2024-05-07 PROCEDURE — 99215 OFFICE O/P EST HI 40 MIN: CPT | Mod: 25

## 2024-05-07 RX ORDER — FLUTICASONE FUROATE, UMECLIDINIUM BROMIDE AND VILANTEROL TRIFENATATE 100; 62.5; 25 UG/1; UG/1; UG/1
100-62.5-25 POWDER RESPIRATORY (INHALATION)
Qty: 1 | Refills: 2 | Status: ACTIVE | COMMUNITY
Start: 2024-05-07 | End: 1900-01-01

## 2024-05-07 RX ORDER — FUROSEMIDE 20 MG/1
20 TABLET ORAL EVERY OTHER DAY
Qty: 15 | Refills: 2 | Status: ACTIVE | COMMUNITY
Start: 2024-05-07 | End: 1900-01-01

## 2024-05-07 NOTE — DISCUSSION/SUMMARY
[FreeTextEntry1] : ---Assessment plan----------The patient has been referred here for further opinion regarding pulmonary problem, 73 male history of atrial fibrillation on anticoagulation history of coronary artery disease s/p stent placement 2024--- secondary pulmonary hypertension due to left heart dysfunction possible underlying diastolic dysfunction  1 secondary pulmonary hypertension due to underlying coronary artery disease and diastolic dysfunction will keep him euvolemic I started him on Lasix---instead of hydrochlorothiazide-labs were ordered 2 COPD--- past history of smoking quit 30 years ago needs PFTs  start trelegy-instructed on proper use 3 has features of karen- needs sleep study 4  PFT prior to next visit 5 coronary artery disease cardiology follow-up------- patient is on metoprolol apparently because she has atrial fibrillation but this is resulting in chronotropic incompetence I have asked him to discuss with cardiologist if he can decrease his dose of metoprolol==== 6) labs drawn in our office today 7) f/u in 6-8 weeks  Thanks for allowing  me to participate  in the care of this patient.  Patient at this time  will follow  the above mentioned recommendations and return back for follow up visit. If you have any questions  I can be reached  at # [phone] (office). Tina Nascimento MD, [facility]  Pulmonary, Critical Care and Sleep Medicine

## 2024-05-07 NOTE — END OF VISIT
[Time Spent: ___ minutes] : I have spent [unfilled] minutes of time on the encounter. [FreeTextEntry3] :   I, Dr. Tina Nascimento  personally performed the evaluation and management (E/M) services for this established patient who presents today with (a) new problem(s)/exacerbation of (an) existing condition(s). That E/M includes conducting the clinically appropriate interval history &/or exam, assessing all new/exacerbated conditions, and establishing a new plan of care. Today, my MIQUEL, Erica Santoyo NP, , was here to observe my evaluation and management service for this new problem/exacerbated condition and follow the plan of care established by me going forward.

## 2024-05-07 NOTE — HISTORY OF PRESENT ILLNESS
[Former] : former [TextBox_4] : This letter  is regarding your patient  who  attended pulmonary out patient office today.  I have reviewed  patient's  past history, social history, family history and medication list. I also  reviewed nurse practitioners/ and fellows  notes and assessment and agree with it.   The patient was referred by   73M with h/o admission for atrial fibrillation with rapid ventricular rate was admitted to [facility] 2024-------- the patient had reported a one week history of increasing dyspnea and increasing edema. On presentation BNP(3168). The patient was evaluated by Cardiology for atrial fibrillation with initiation of apixaban and continuation of metoprolol. Echocardiogram noted moderate left ventricular hypertrophy, normal left ventricular systolic function with an ejection fraction of 60 to 65 %, increased right ventricular wall thickness and borderline reduced systolic function, moderately dilated right atrium, mild to moderate mitral regurgitation, severe pulmonary hypertension, mild to moderate tricuspid regurgitation, moderately dilated main pulmonary artery, no pericardial effusion.. Furosemide was administered for diuresis. Lower extremity arterial doppler was without significant arterial disease. The patient underwent transesophageal echocardiogram which was without evidence of left atrial appendage thrombus. DC cardioversion was performed resulting in sinus rhythm. Repeat studies noted improvement in the renal function.  ----- ----Echo  date---2/2024 CONCLUSIONS:    1. Left ventricular cavity is normal. Left ventricular wall thickness is normal. Left ventricular systolic function is normal.  2. Mild mitral regurgitation.  3. Structurally normal mitral valve with normal leaflet excursion.  4. Minimal (grade 1) spontaneous echo contrast located in the left atrial apendage.  5. No evidence of left atrial appendage thrombus. The left atrial appendage emptying velocity is low.  ----Pft date--------- ----CtA chest  scan date--2/2024 IMPRESSION: No evidence acute pulmonary emboli or other acute intrathoracic pathology. Little change in multiple small and mildly prominent mediastinal and bilateral hilar lymph nodes, several of which are slightly more prominent. ----- ----Cath date-----2/2024 Hemodynamic Pressures:  Phase         Location          [mmHg]               [mmHg] [mmHg]           HR Baseline      LV                  s      148 ed      21         61 Baseline      Ao                  s      109               d      63     m  77           Baseline      RV                  s      61 ed      16         76 Baseline      PA                  s      68                d      30   m  44          Baseline      PCW                 a      24                v      29     m  25          Baseline      RA                  a      14                v      20  m  17         -------TPG  ---44- 25  IS 19  ----------------------------------------------------------------------------------------------------------------------- -  transpulmonary gradient (TPG) IS  the difference between the mean pulmonary artery pressure (mPAP) and the PCWP ] DIASTOLIC PRESSURE GRADIENT  IE [  diastolic Ppa --Ppcw gradient]   (DPG)   WAS   30-  25  =  5

## 2024-05-07 NOTE — REVIEW OF SYSTEMS
[Dry Eyes] : dry eyes [Cough] : cough [Nocturia] : nocturia [Obesity] : obesity [Fever] : no fever [Chest Discomfort] : no chest discomfort [Hay Fever] : no hay fever [Arthralgias] : no arthralgias [Raynaud] : no raynaud [Anemia] : no anemia [Headache] : no headache [Depression] : no depression

## 2024-05-07 NOTE — PHYSICAL EXAM
[No Acute Distress] : no acute distress [Polyps] : polyps [III] : Mallampati Class: III [No Neck Mass] : no neck mass [No Resp Distress] : no resp distress [Benign] : benign [Normal Color/ Pigmentation] : normal color/ pigmentation [No Focal Deficits] : no focal deficits [Oriented x3] : oriented x3 [Kyphosis] : kyphosis [TextBox_54] : Loud P2, pansystolic murmur at lower left sternal border [TextBox_80] : MILD KYPHOSIS [TextBox_99] : Uses a cane to walk [TextBox_105] : Pedal edema

## 2024-05-08 ENCOUNTER — RESULT REVIEW (OUTPATIENT)
Age: 74
End: 2024-05-08

## 2024-05-08 ENCOUNTER — OUTPATIENT (OUTPATIENT)
Dept: OUTPATIENT SERVICES | Facility: HOSPITAL | Age: 74
LOS: 1 days | End: 2024-05-08
Payer: MEDICARE

## 2024-05-08 DIAGNOSIS — Z98.89 OTHER SPECIFIED POSTPROCEDURAL STATES: Chronic | ICD-10-CM

## 2024-05-08 DIAGNOSIS — I27.20 PULMONARY HYPERTENSION, UNSPECIFIED: ICD-10-CM

## 2024-05-08 DIAGNOSIS — I48.91 UNSPECIFIED ATRIAL FIBRILLATION: ICD-10-CM

## 2024-05-08 DIAGNOSIS — Z98.890 OTHER SPECIFIED POSTPROCEDURAL STATES: Chronic | ICD-10-CM

## 2024-05-08 LAB
25(OH)D3 SERPL-MCNC: 30 NG/ML
A1AT SERPL-MCNC: 170 MG/DL
ALBUMIN SERPL ELPH-MCNC: 4.4 G/DL
ALP BLD-CCNC: 111 U/L
ALT SERPL-CCNC: 18 U/L
ANION GAP SERPL CALC-SCNC: 14 MMOL/L
AST SERPL-CCNC: 21 U/L
BILIRUB SERPL-MCNC: 1 MG/DL
BUN SERPL-MCNC: 30 MG/DL
CALCIUM SERPL-MCNC: 9.3 MG/DL
CALCIUM SERPL-MCNC: 9.3 MG/DL
CARDIOLIPIN AB SER IA-ACNC: NEGATIVE
CCP AB SER IA-ACNC: <8 UNITS
CHLORIDE SERPL-SCNC: 99 MMOL/L
CO2 SERPL-SCNC: 26 MMOL/L
CREAT SERPL-MCNC: 1.74 MG/DL
CRP SERPL-MCNC: 8 MG/L
DEPRECATED KAPPA LC FREE/LAMBDA SER: 2.12 RATIO
EGFR: 41 ML/MIN/1.73M2
ERYTHROCYTE [SEDIMENTATION RATE] IN BLOOD BY WESTERGREN METHOD: 66 MM/HR
GLUCOSE SERPL-MCNC: 115 MG/DL
HCYS SERPL-MCNC: 20.2 UMOL/L
IGA SER QL IEP: 391 MG/DL
IGG SER QL IEP: 1247 MG/DL
IGM SER QL IEP: 74 MG/DL
INR PPP: 1.82 RATIO
KAPPA LC CSF-MCNC: 2.17 MG/DL
KAPPA LC SERPL-MCNC: 4.59 MG/DL
NT-PROBNP SERPL-MCNC: 4501 PG/ML
PARATHYROID HORMONE INTACT: 64 PG/ML
PHOSPHATE SERPL-MCNC: 3.3 MG/DL
POTASSIUM SERPL-SCNC: 3.9 MMOL/L
PROT SERPL-MCNC: 7.4 G/DL
PT BLD: 20.2 SEC
RF+CCP IGG SER-IMP: NEGATIVE
RHEUMATOID FACT SER QL: 10 IU/ML
SODIUM SERPL-SCNC: 139 MMOL/L
TSH SERPL-ACNC: 4.04 UIU/ML
URATE SERPL-MCNC: 10.6 MG/DL
VIT B12 SERPL-MCNC: 353 PG/ML

## 2024-05-08 PROCEDURE — 93306 TTE W/DOPPLER COMPLETE: CPT | Mod: 26

## 2024-05-08 PROCEDURE — 93306 TTE W/DOPPLER COMPLETE: CPT

## 2024-05-09 DIAGNOSIS — R76.8 OTHER SPECIFIED ABNORMAL IMMUNOLOGICAL FINDINGS IN SERUM: ICD-10-CM

## 2024-05-09 LAB
ANA PAT FLD IF-IMP: ABNORMAL
ANACR T: ABNORMAL
ENA SCL70 IGG SER IA-ACNC: <0.2 AL
ENA SS-A AB SER IA-ACNC: <0.2 AL
ENA SS-B AB SER IA-ACNC: <0.2 AL
M TB IFN-G BLD-IMP: ABNORMAL
QUANTIFERON TB PLUS MITOGEN MINUS NIL: 0.11 IU/ML
QUANTIFERON TB PLUS NIL: 0.01 IU/ML
QUANTIFERON TB PLUS TB1 MINUS NIL: 0 IU/ML
QUANTIFERON TB PLUS TB2 MINUS NIL: 0 IU/ML
TOTAL IGE SMQN RAST: 2269 KU/L

## 2024-05-13 LAB — COLLAGEN CTX SERPL-MCNC: 497 PG/ML

## 2024-06-27 PROBLEM — N40.1 BPH WITH OBSTRUCTION/LOWER URINARY TRACT SYMPTOMS: Status: ACTIVE | Noted: 2020-05-05

## 2024-07-02 ENCOUNTER — APPOINTMENT (OUTPATIENT)
Dept: UROLOGY | Facility: CLINIC | Age: 74
End: 2024-07-02
Payer: MEDICARE

## 2024-07-02 DIAGNOSIS — R97.20 ELEVATED PROSTATE, SPECIFIC ANTIGEN [PSA]: ICD-10-CM

## 2024-07-02 DIAGNOSIS — N18.32 CHRONIC KIDNEY DISEASE, STAGE 3B: ICD-10-CM

## 2024-07-02 LAB
ANION GAP SERPL CALC-SCNC: 13 MMOL/L
BUN SERPL-MCNC: 31 MG/DL
CALCIUM SERPL-MCNC: 9.5 MG/DL
CHLORIDE SERPL-SCNC: 105 MMOL/L
CO2 SERPL-SCNC: 25 MMOL/L
CREAT SERPL-MCNC: 1.76 MG/DL
EGFR: 41 ML/MIN/1.73M2
GLUCOSE SERPL-MCNC: 103 MG/DL
POTASSIUM SERPL-SCNC: 5 MMOL/L
PSA FREE FLD-MCNC: 23 %
PSA FREE SERPL-MCNC: 1.87 NG/ML
PSA SERPL-MCNC: 8.03 NG/ML
SODIUM SERPL-SCNC: 143 MMOL/L

## 2024-07-02 PROCEDURE — G2211 COMPLEX E/M VISIT ADD ON: CPT

## 2024-07-02 PROCEDURE — 99214 OFFICE O/P EST MOD 30 MIN: CPT

## 2024-07-02 RX ORDER — CLOPIDOGREL BISULFATE 300 MG/1
TABLET, FILM COATED ORAL
Refills: 0 | Status: ACTIVE | COMMUNITY

## 2024-07-07 ENCOUNTER — NON-APPOINTMENT (OUTPATIENT)
Age: 74
End: 2024-07-07

## 2024-07-08 ENCOUNTER — APPOINTMENT (OUTPATIENT)
Dept: PULMONOLOGY | Facility: CLINIC | Age: 74
End: 2024-07-08
Payer: MEDICARE

## 2024-07-08 VITALS
DIASTOLIC BLOOD PRESSURE: 84 MMHG | WEIGHT: 243.94 LBS | TEMPERATURE: 98 F | RESPIRATION RATE: 15 BRPM | HEART RATE: 67 BPM | HEIGHT: 70 IN | SYSTOLIC BLOOD PRESSURE: 167 MMHG | BODY MASS INDEX: 34.92 KG/M2 | OXYGEN SATURATION: 91 %

## 2024-07-08 DIAGNOSIS — I27.21 SECONDARY PULMONARY ARTERIAL HYPERTENSION: ICD-10-CM

## 2024-07-08 DIAGNOSIS — N40.1 BENIGN PROSTATIC HYPERPLASIA WITH LOWER URINARY TRACT SYMPMS: ICD-10-CM

## 2024-07-08 DIAGNOSIS — N13.8 BENIGN PROSTATIC HYPERPLASIA WITH LOWER URINARY TRACT SYMPMS: ICD-10-CM

## 2024-07-08 PROCEDURE — 94618 PULMONARY STRESS TESTING: CPT

## 2024-07-08 PROCEDURE — 36415 COLL VENOUS BLD VENIPUNCTURE: CPT

## 2024-07-08 PROCEDURE — ZZZZZ: CPT

## 2024-07-08 PROCEDURE — 94010 BREATHING CAPACITY TEST: CPT

## 2024-07-08 PROCEDURE — 99215 OFFICE O/P EST HI 40 MIN: CPT | Mod: 25

## 2024-07-09 LAB
ALBUMIN SERPL ELPH-MCNC: 4.3 G/DL
ALT SERPL-CCNC: 30 U/L
ANION GAP SERPL CALC-SCNC: 15 MMOL/L
AST SERPL-CCNC: 28 U/L
BILIRUB SERPL-MCNC: 1.1 MG/DL
CALCIUM SERPL-MCNC: 9.7 MG/DL
CHLORIDE SERPL-SCNC: 103 MMOL/L
CO2 SERPL-SCNC: 25 MMOL/L
CREAT SERPL-MCNC: 1.69 MG/DL
EGFR: 43 ML/MIN/1.73M2
GLUCOSE SERPL-MCNC: 95 MG/DL
HGB BLD-MCNC: 13.9 G/DL
MCHC RBC-ENTMCNC: 29.1 PG
MCHC RBC-ENTMCNC: 31 GM/DL
MCV RBC AUTO: 93.9 FL
NT-PROBNP SERPL-MCNC: 6599 PG/ML
PLATELET # BLD AUTO: 253 K/UL
PROT SERPL-MCNC: 7.4 G/DL
RBC # BLD: 4.77 M/UL
RBC # FLD: 17.9 %
WBC # FLD AUTO: 7.25 K/UL

## 2024-07-10 ENCOUNTER — APPOINTMENT (OUTPATIENT)
Dept: SLEEP CENTER | Facility: CLINIC | Age: 74
End: 2024-07-10

## 2024-07-11 ENCOUNTER — NON-APPOINTMENT (OUTPATIENT)
Age: 74
End: 2024-07-11

## 2024-07-11 ENCOUNTER — APPOINTMENT (OUTPATIENT)
Dept: ELECTROPHYSIOLOGY | Facility: CLINIC | Age: 74
End: 2024-07-11
Payer: MEDICARE

## 2024-07-11 VITALS
SYSTOLIC BLOOD PRESSURE: 126 MMHG | OXYGEN SATURATION: 95 % | DIASTOLIC BLOOD PRESSURE: 62 MMHG | HEIGHT: 70 IN | HEART RATE: 60 BPM | BODY MASS INDEX: 34.5 KG/M2 | WEIGHT: 241 LBS

## 2024-07-11 DIAGNOSIS — I48.91 UNSPECIFIED ATRIAL FIBRILLATION: ICD-10-CM

## 2024-07-11 PROCEDURE — 99215 OFFICE O/P EST HI 40 MIN: CPT

## 2024-07-11 PROCEDURE — 93000 ELECTROCARDIOGRAM COMPLETE: CPT | Mod: 59

## 2024-07-11 RX ORDER — AMIODARONE HYDROCHLORIDE 200 MG/1
200 TABLET ORAL TWICE DAILY
Refills: 0 | Status: ACTIVE | COMMUNITY

## 2024-07-11 RX ORDER — METOPROLOL SUCCINATE 25 MG/1
25 TABLET, EXTENDED RELEASE ORAL
Qty: 90 | Refills: 3 | Status: ACTIVE | COMMUNITY
Start: 2024-07-11 | End: 1900-01-01

## 2024-07-11 RX ORDER — APIXABAN 5 MG/1
5 TABLET, FILM COATED ORAL TWICE DAILY
Refills: 0 | Status: ACTIVE | COMMUNITY

## 2024-07-22 ENCOUNTER — RX RENEWAL (OUTPATIENT)
Age: 74
End: 2024-07-22

## 2024-08-20 ENCOUNTER — APPOINTMENT (OUTPATIENT)
Dept: NEPHROLOGY | Facility: CLINIC | Age: 74
End: 2024-08-20
Payer: MEDICARE

## 2024-08-20 VITALS — HEART RATE: 81 BPM | SYSTOLIC BLOOD PRESSURE: 152 MMHG | DIASTOLIC BLOOD PRESSURE: 84 MMHG

## 2024-08-20 VITALS — DIASTOLIC BLOOD PRESSURE: 85 MMHG | SYSTOLIC BLOOD PRESSURE: 168 MMHG | HEART RATE: 73 BPM

## 2024-08-20 DIAGNOSIS — N18.32 CHRONIC KIDNEY DISEASE, STAGE 3B: ICD-10-CM

## 2024-08-20 DIAGNOSIS — I10 ESSENTIAL (PRIMARY) HYPERTENSION: ICD-10-CM

## 2024-08-20 DIAGNOSIS — R60.0 LOCALIZED EDEMA: ICD-10-CM

## 2024-08-20 PROCEDURE — 99215 OFFICE O/P EST HI 40 MIN: CPT

## 2024-08-20 PROCEDURE — 99205 OFFICE O/P NEW HI 60 MIN: CPT

## 2024-08-20 PROCEDURE — G2211 COMPLEX E/M VISIT ADD ON: CPT

## 2024-08-20 NOTE — PHYSICAL EXAM
[General Appearance - Alert] : alert [General Appearance - In No Acute Distress] : in no acute distress [Sclera] : the sclera and conjunctiva were normal [PERRL With Normal Accommodation] : pupils were equal in size, round, and reactive to light [Extraocular Movements] : extraocular movements were intact [Outer Ear] : the ears and nose were normal in appearance [Oropharynx] : the oropharynx was normal [Neck Appearance] : the appearance of the neck was normal [Neck Cervical Mass (___cm)] : no neck mass was observed [Thyroid Diffuse Enlargement] : the thyroid was not enlarged [Jugular Venous Distention Increased] : there was no jugular-venous distention [Thyroid Nodule] : there were no palpable thyroid nodules [Auscultation Breath Sounds / Voice Sounds] : lungs were clear to auscultation bilaterally [Heart Rate And Rhythm] : heart rate was normal and rhythm regular [Heart Sounds] : normal S1 and S2 [Murmurs] : no murmurs [Heart Sounds Gallop] : no gallops [Heart Sounds Pericardial Friction Rub] : no pericardial rub [Full Pulse] : the pedal pulses are present [Bowel Sounds] : normal bowel sounds [Abdomen Soft] : soft [Abdomen Tenderness] : non-tender [Abdomen Mass (___ Cm)] : no abdominal mass palpated [No CVA Tenderness] : no ~M costovertebral angle tenderness [No Spinal Tenderness] : no spinal tenderness [Abnormal Walk] : normal gait [Nail Clubbing] : no clubbing  or cyanosis of the fingernails [Musculoskeletal - Swelling] : no joint swelling seen [Motor Tone] : muscle strength and tone were normal [Skin Color & Pigmentation] : normal skin color and pigmentation [Skin Turgor] : normal skin turgor [] : no rash [Sensation] : the sensory exam was normal to light touch and pinprick [Deep Tendon Reflexes (DTR)] : deep tendon reflexes were 2+ and symmetric [No Focal Deficits] : no focal deficits [Oriented To Time, Place, And Person] : oriented to person, place, and time [Impaired Insight] : insight and judgment were intact [Affect] : the affect was normal [Pitting Edema] : pitting edema present [___ +] : bilateral [unfilled]+ pretibial pitting edema

## 2024-08-20 NOTE — RESULTS/DATA
[TextEntry] : labs scanned from 8/7/24: Hgb 13.6 142/4.9/106/30/28/1.79<102  GFR 40  8/8/24: Iron 103  TIBC   361  Ferritin 51

## 2024-08-20 NOTE — CURRENT MEDS
[TextEntry] : Eliquis 5mg po bid Metoprolol 50mg po qd Amiodarone 200mg po qd clopidogrel 75mg po qd Atorvastatin 40mg po qd Trelegy 100mcg/62.5 mcg/25mcg

## 2024-08-20 NOTE — HISTORY OF PRESENT ILLNESS
[FreeTextEntry1] : 73M with BPH, Afib on AC, HTN, CAD, HLP, here to establish care for CKD.   Pt was told that he needs to see a nephrologist for an elevated Cr. Pt states that his Cr has been around 1.7 for some time. He has normal electrolytes. He has been seeing Dr. Singh for a long time and has had prostate biopsies twice which were both benign.  His cardiologist took him off of lasix and losartan 2 wks ago. Pt was hospitalized for Afib in February and he had a CT looking for blood clots which was complicate by needing inpatient rehab for 4 wks. He is having an ablation at the end of October.  No LE edema, no shortness of breath, no blood in the urine, no dysuria, no bubbly or foamy urine.   Cardiology: Dr. Latif [facility] PCP: Dejuan Howe [place]

## 2024-08-20 NOTE — PLAN
[TextEntry] : Will restart furosemide 20mg po qd x 7 days and then qod. Will trend weights and monitor BP at home. He has been taken off losartan but unsure if it is related to high K or worsening renal failure. Will obtain renal and bladder US to eval for cysts or stones. Will call me with results of weights and BP Will follow up in office in 2 months or earlier

## 2024-08-21 ENCOUNTER — APPOINTMENT (OUTPATIENT)
Dept: NEPHROLOGY | Facility: CLINIC | Age: 74
End: 2024-08-21

## 2024-08-26 LAB
APPEARANCE: CLEAR
BACTERIA: NEGATIVE /HPF
BILIRUBIN URINE: NEGATIVE
BLOOD URINE: NEGATIVE
CAST: 0 /LPF
COLOR: NORMAL
CREAT SPEC-SCNC: 117 MG/DL
CREAT/PROT UR: 0.4 RATIO
EPITHELIAL CELLS: 0 /HPF
GLUCOSE QUALITATIVE U: NEGATIVE MG/DL
KETONES URINE: NEGATIVE MG/DL
LEUKOCYTE ESTERASE URINE: NEGATIVE
MICROSCOPIC-UA: NORMAL
NITRITE URINE: NEGATIVE
PH URINE: 6.5
PROT UR-MCNC: 48 MG/DL
PROTEIN URINE: 30 MG/DL
RED BLOOD CELLS URINE: 1 /HPF
SPECIFIC GRAVITY URINE: 1.02
UROBILINOGEN URINE: 1 MG/DL
WHITE BLOOD CELLS URINE: 0 /HPF

## 2024-09-09 ENCOUNTER — APPOINTMENT (OUTPATIENT)
Dept: ULTRASOUND IMAGING | Facility: CLINIC | Age: 74
End: 2024-09-09
Payer: MEDICARE

## 2024-09-09 PROCEDURE — 76770 US EXAM ABDO BACK WALL COMP: CPT

## 2024-10-09 ENCOUNTER — OUTPATIENT (OUTPATIENT)
Dept: OUTPATIENT SERVICES | Facility: HOSPITAL | Age: 74
LOS: 1 days | End: 2024-10-09
Payer: MEDICARE

## 2024-10-09 VITALS
SYSTOLIC BLOOD PRESSURE: 130 MMHG | WEIGHT: 238.98 LBS | OXYGEN SATURATION: 98 % | HEART RATE: 48 BPM | DIASTOLIC BLOOD PRESSURE: 76 MMHG | RESPIRATION RATE: 18 BRPM | HEIGHT: 69 IN | TEMPERATURE: 97 F

## 2024-10-09 DIAGNOSIS — Z98.89 OTHER SPECIFIED POSTPROCEDURAL STATES: Chronic | ICD-10-CM

## 2024-10-09 DIAGNOSIS — Z95.5 PRESENCE OF CORONARY ANGIOPLASTY IMPLANT AND GRAFT: Chronic | ICD-10-CM

## 2024-10-09 DIAGNOSIS — Z98.890 OTHER SPECIFIED POSTPROCEDURAL STATES: Chronic | ICD-10-CM

## 2024-10-09 DIAGNOSIS — Z92.89 PERSONAL HISTORY OF OTHER MEDICAL TREATMENT: Chronic | ICD-10-CM

## 2024-10-09 DIAGNOSIS — Z01.818 ENCOUNTER FOR OTHER PREPROCEDURAL EXAMINATION: ICD-10-CM

## 2024-10-09 LAB
ALBUMIN SERPL ELPH-MCNC: 4.1 G/DL — SIGNIFICANT CHANGE UP (ref 3.3–5.2)
ALP SERPL-CCNC: 135 U/L — HIGH (ref 40–120)
ALT FLD-CCNC: 26 U/L — SIGNIFICANT CHANGE UP
ANION GAP SERPL CALC-SCNC: 13 MMOL/L — SIGNIFICANT CHANGE UP (ref 5–17)
APTT BLD: 33.3 SEC — SIGNIFICANT CHANGE UP (ref 24.5–35.6)
AST SERPL-CCNC: 33 U/L — SIGNIFICANT CHANGE UP
BASOPHILS # BLD AUTO: 0.09 K/UL — SIGNIFICANT CHANGE UP (ref 0–0.2)
BASOPHILS NFR BLD AUTO: 1.3 % — SIGNIFICANT CHANGE UP (ref 0–2)
BILIRUB SERPL-MCNC: 1.2 MG/DL — SIGNIFICANT CHANGE UP (ref 0.4–2)
BLD GP AB SCN SERPL QL: SIGNIFICANT CHANGE UP
BUN SERPL-MCNC: 30.7 MG/DL — HIGH (ref 8–20)
CALCIUM SERPL-MCNC: 9.1 MG/DL — SIGNIFICANT CHANGE UP (ref 8.4–10.5)
CHLORIDE SERPL-SCNC: 100 MMOL/L — SIGNIFICANT CHANGE UP (ref 96–108)
CO2 SERPL-SCNC: 26 MMOL/L — SIGNIFICANT CHANGE UP (ref 22–29)
CREAT SERPL-MCNC: 1.63 MG/DL — HIGH (ref 0.5–1.3)
EGFR: 44 ML/MIN/1.73M2 — LOW
EOSINOPHIL # BLD AUTO: 0.2 K/UL — SIGNIFICANT CHANGE UP (ref 0–0.5)
EOSINOPHIL NFR BLD AUTO: 2.8 % — SIGNIFICANT CHANGE UP (ref 0–6)
GLUCOSE SERPL-MCNC: 96 MG/DL — SIGNIFICANT CHANGE UP (ref 70–99)
HCT VFR BLD CALC: 45.2 % — SIGNIFICANT CHANGE UP (ref 39–50)
HGB BLD-MCNC: 14.3 G/DL — SIGNIFICANT CHANGE UP (ref 13–17)
IMM GRANULOCYTES NFR BLD AUTO: 0.3 % — SIGNIFICANT CHANGE UP (ref 0–0.9)
INR BLD: 1.97 RATIO — HIGH (ref 0.85–1.16)
LYMPHOCYTES # BLD AUTO: 1.12 K/UL — SIGNIFICANT CHANGE UP (ref 1–3.3)
LYMPHOCYTES # BLD AUTO: 15.9 % — SIGNIFICANT CHANGE UP (ref 13–44)
MAGNESIUM SERPL-MCNC: 2 MG/DL — SIGNIFICANT CHANGE UP (ref 1.6–2.6)
MCHC RBC-ENTMCNC: 31.6 GM/DL — LOW (ref 32–36)
MCHC RBC-ENTMCNC: 31.7 PG — SIGNIFICANT CHANGE UP (ref 27–34)
MCV RBC AUTO: 100.2 FL — HIGH (ref 80–100)
MONOCYTES # BLD AUTO: 0.63 K/UL — SIGNIFICANT CHANGE UP (ref 0–0.9)
MONOCYTES NFR BLD AUTO: 8.9 % — SIGNIFICANT CHANGE UP (ref 2–14)
NEUTROPHILS # BLD AUTO: 5 K/UL — SIGNIFICANT CHANGE UP (ref 1.8–7.4)
NEUTROPHILS NFR BLD AUTO: 70.8 % — SIGNIFICANT CHANGE UP (ref 43–77)
PLATELET # BLD AUTO: 185 K/UL — SIGNIFICANT CHANGE UP (ref 150–400)
POTASSIUM SERPL-MCNC: 4.8 MMOL/L — SIGNIFICANT CHANGE UP (ref 3.5–5.3)
POTASSIUM SERPL-SCNC: 4.8 MMOL/L — SIGNIFICANT CHANGE UP (ref 3.5–5.3)
PROT SERPL-MCNC: 7.4 G/DL — SIGNIFICANT CHANGE UP (ref 6.6–8.7)
PROTHROM AB SERPL-ACNC: 22.7 SEC — HIGH (ref 9.9–13.4)
RBC # BLD: 4.51 M/UL — SIGNIFICANT CHANGE UP (ref 4.2–5.8)
RBC # FLD: 17.1 % — HIGH (ref 10.3–14.5)
SODIUM SERPL-SCNC: 139 MMOL/L — SIGNIFICANT CHANGE UP (ref 135–145)
WBC # BLD: 7.06 K/UL — SIGNIFICANT CHANGE UP (ref 3.8–10.5)
WBC # FLD AUTO: 7.06 K/UL — SIGNIFICANT CHANGE UP (ref 3.8–10.5)

## 2024-10-09 PROCEDURE — 86850 RBC ANTIBODY SCREEN: CPT

## 2024-10-09 PROCEDURE — 93005 ELECTROCARDIOGRAM TRACING: CPT

## 2024-10-09 PROCEDURE — 86900 BLOOD TYPING SEROLOGIC ABO: CPT

## 2024-10-09 PROCEDURE — 93010 ELECTROCARDIOGRAM REPORT: CPT

## 2024-10-09 PROCEDURE — 80053 COMPREHEN METABOLIC PANEL: CPT

## 2024-10-09 PROCEDURE — 86901 BLOOD TYPING SEROLOGIC RH(D): CPT

## 2024-10-09 PROCEDURE — 85610 PROTHROMBIN TIME: CPT

## 2024-10-09 PROCEDURE — 36415 COLL VENOUS BLD VENIPUNCTURE: CPT

## 2024-10-09 PROCEDURE — 83735 ASSAY OF MAGNESIUM: CPT

## 2024-10-09 PROCEDURE — 85025 COMPLETE CBC W/AUTO DIFF WBC: CPT

## 2024-10-09 PROCEDURE — 85730 THROMBOPLASTIN TIME PARTIAL: CPT

## 2024-10-09 PROCEDURE — G0463: CPT

## 2024-10-09 NOTE — H&P PST ADULT - ADDITIONAL PE
b/l LE mild edema b/l pitting leg edema 1-2+., which patient reports is much improved since admission

## 2024-10-09 NOTE — H&P PST ADULT - NSICDXPASTMEDICALHX_GEN_ALL_CORE_FT
PAST MEDICAL HISTORY:  Atrial fibrillation     BPH (benign prostatic hyperplasia)     CAD (coronary artery disease)     Former cigarette smoker     History of heart failure     HTN (hypertension)     Hypertension     NSVT (nonsustained ventricular tachycardia)     Obesity     Wrist pain, right

## 2024-10-09 NOTE — H&P PST ADULT - NSICDXPASTSURGICALHX_GEN_ALL_CORE_FT
PAST SURGICAL HISTORY:  H/O prostate biopsy     History of cardioversion     S/P colonoscopy     S/P hernia repair 1980    S/P knee surgery righty scope in 1990 and left meniscus tear in 1977    Stented coronary artery

## 2024-10-09 NOTE — H&P PST ADULT - MUSCULOSKELETAL
regional negative normal/ROM intact/normal gait/strength 5/5 bilateral upper extremities/strength 5/5 bilateral lower extremities

## 2024-10-09 NOTE — H&P PST ADULT - OPHTHALMOLOGIC
- home PO medications reordered as pt passed cine - klonopin changed to IV ativan while pt NPO  - will need to reorder other home medications when able - home PO medications reordered as pt passed cine negative

## 2024-10-09 NOTE — H&P PST ADULT - ASSESSMENT
Indication: 73 year old gentleman with history of COPD, former smoker, CKD 3, CAD with prior PCI mRCA 11/2020(Resolute ana DANO placed 3.0 x18mm), recent hospital admission at [facility] 2/9/24-2/18/24 with decompensated heart failure in the setting of new onset AFib with RVR and CAD, s/p DANO to pLAD (2.7 x 22 [place] DANO) and JAMA/DCCV 2/14/24. Patient was discharged home on 2/18/24. During office visit, found with recurrent Afib despite amio therapy. Given his slow heart, he is not the best candidate for  antiarrhythmics. He presents to PST today on 10/9/24 for his upcoming scheduled JAMA/Afib ablation with Dr. Ruiz on 10/22/24.     Risk Stratification:  ASA: PER ANESTHESIA  Mallampati: PER ANESTHESIA      Plan/Recommendations:   -plan for JAMA/ Persistenet Afib Ablation on 10/22/24 with Dr. Tony Ruiz  -patient seen and examined in PST on 10/9/24  -ECG and Labs reviewed  -NPO after midnight prior with exception of sip of water with morning medications  -Anticoagulation Instructions: Hold Eliquis on day of procedure only.   -SGLT-2 Inhibitor Instructions: ***  -GLP-1 Agonist Instructions: ***  -Pre-procedure instructions provided (verbal & written)   -Consent to be obtained by attending electrophysiologist on the scheduled procedure date             Indication: 73 year old gentleman with history of COPD, former smoker, CKD 3, CAD with prior PCI mRCA 11/2020(Resolute ana DANO placed 3.0 x18mm), recent hospital admission at [facility] 2/9/24-2/18/24 with decompensated heart failure in the setting of new onset AFib with RVR and CAD, s/p DANO to pLAD (2.7 x 22 [place] DANO) and JAMA/DCCV 2/14/24. Patient was discharged home on 2/18/24. During office visit, found with recurrent Afib despite amio therapy. Given his slow heart, he is not the best candidate for  antiarrhythmics. He presents to PST today on 10/9/24 for his upcoming scheduled JAMA/Afib ablation with Dr. Ruiz on 10/22/24.     Risk Stratification:  ASA: PER ANESTHESIA  Mallampati: PER ANESTHESIA      Plan/Recommendations:   -plan for JAMA/  Afib Ablation on 10/22/24 with Dr. Tony Ruiz  -patient seen and examined in PST on 10/9/24  -ECG and Labs reviewed  -NPO after midnight prior with exception of sip of water with morning medications  -Anticoagulation Instructions: Hold Eliquis on day of procedure only.   -SGLT-2 Inhibitor Instructions: ***  -GLP-1 Agonist Instructions: ***  -Pre-procedure instructions provided (verbal & written)   -Consent to be obtained by attending electrophysiologist on the scheduled procedure date             Indication: 73 year old gentleman with history of former smoker, HTN; COPD; Pulmonary HTN; CKD 3, CAD with prior PCI mRCA 11/2020(Resolute ana DANO placed 3.0 x18mm), recent hospital admission at [facility] 2/9/24-2/18/24 with decompensated heart failure in the setting of new onset AFib with RVR and CAD, s/p DANO to pLAD (2.7 x 22 [place] DANO) and JAMA/DCCV 2/14/24. Patient was discharged home on 2/18/24. During office visit, found with recurrent Afib despite amio therapy. Given his slow heart, he is not the best candidate for  antiarrhythmics. He presents to PST today on 10/9/24 for his upcoming scheduled JAMA/Afib ablation with Dr. Ruiz on 10/22/24.     Risk Stratification:  ASA: PER ANESTHESIA  Mallampati: PER ANESTHESIA      Plan/Recommendations:   -plan for JAMA/  Afib Ablation on 10/22/24 with Dr. Tony Ruiz  -patient seen and examined in PST on 10/9/24  -ECG and Labs reviewed  -NPO after midnight prior with exception of sip of water with morning medications  -Anticoagulation Instructions: Hold Eliquis on day of procedure only.   -Pre-procedure instructions provided (verbal & written)   -Consent to be obtained by attending electrophysiologist on the scheduled procedure date             Indication: 73 year old gentleman with history of former smoker, HTN; COPD; Pulmonary HTN; CKD 3, CAD with prior PCI mRCA 11/2020(Resolute ana DANO placed 3.0 x18mm), recent hospital admission at [facility] 2/9/24-2/18/24 with decompensated heart failure in the setting of new onset AFib with RVR and CAD, s/p DANO to pLAD (2.7 x 22 [place] DANO) and JAMA/DCCV 2/14/24. Patient was discharged home on 2/18/24. During office visit, found with recurrent Afib despite amio therapy. Given his slow heart, he is not the best candidate for  antiarrhythmics. He presents to PST today on 10/9/24 for his upcoming scheduled JAMA/Afib ablation with Dr. Ruiz on 10/22/24.   During PST appointment, EKG with AF/ junctional appearing with rate in 40's. D/W Dr. Ruiz, will stop amiodarone at this time.     Risk Stratification:  ASA: PER ANESTHESIA  Mallampati: PER ANESTHESIA      Plan/Recommendations:   -plan for JAMA/  Afib Ablation on 10/22/24 with Dr. Tony Ruiz  -patient seen and examined in PST on 10/9/24  -ECG and Labs reviewed  -NPO after midnight prior with exception of sip of water with morning medications  -Anticoagulation Instructions: Hold Eliquis on day of procedure only.   -Pre-procedure instructions provided (verbal & written)   -Consent to be obtained by attending electrophysiologist on the scheduled procedure date  -D/W Dr. Ruiz, Stop amiodarone given bradycardia rate 40's.              Indication: 73 year old gentleman with history of former smoker, HTN; COPD; Pulmonary HTN; CKD 3, CAD with prior PCI mRCA 11/2020(Resolute ana DANO placed 3.0 x18mm), recent hospital admission at [facility] 2/9/24-2/18/24 with decompensated heart failure in the setting of new onset AFib with RVR and CAD, s/p DANO to pLAD (2.7 x 22 [place] DANO) and JAMA/DCCV 2/14/24. Patient was discharged home on 2/18/24. During office visit, found with recurrent Afib despite amio therapy. Given his slow heart, he is not the best candidate for  antiarrhythmics. He presents to PST today on 10/9/24 for his upcoming scheduled JAMA/Afib ablation with Dr. Ruiz on 10/22/24.   During PST appointment, EKG with AF/ junctional appearing with rate in 40's. D/W Dr. Ruiz, will stop amiodarone at this time.     Risk Stratification:  ASA: PER ANESTHESIA  Mallampati: PER ANESTHESIA      Plan/Recommendations:   -plan for JAMA/  Afib Ablation on 10/22/24 with Dr. Tony Ruiz  -patient seen and examined in PST on 10/9/24  -ECG and Labs reviewed  -NPO after midnight prior with exception of sip of water with morning medications  -Anticoagulation Instructions: Hold Eliquis on day of procedure only.   -Pre-procedure instructions provided (verbal & written)   -Consent to be obtained by attending electrophysiologist on the scheduled procedure date  -D/W Dr. Ruiz, Stop amiodarone given bradycardia rate 40's.   -Scr during PST 1.63 (10/9/24);  prior Scr 1.69 (7/8/24) <--- Scr 1.76 (7/2/24) <----Scr 1.74 (5/7/24)<---- Scr 1.87 (2/15/24) <--- 1.64 (2/13/24)

## 2024-10-09 NOTE — H&P PST ADULT - HISTORY OF PRESENT ILLNESS
Department of Cardiology                                                                  [facility]/[address]                                                            Telephone: [phone]. Fax:[phone]                                                                             Pre-EP Procedure H and P      HPI: 73 year old gentleman with history of COPD, former smoker, CKD 3, CAD with prior PCI mRCA 2020(Resolute ana DANO placed 3.0 x18mm), recent hospital admission at [facility] 24-24 with decompensated heart failure in the setting of new onset AFib with RVR and CAD, s/p DANO to pLAD (2.7 x 22 [place] DANO) and JAMA/DCCV 24. The patient initially presented to Dr Salas after noting a one-week history of increasing dyspnea and increasing edema and was found to have AF with RVR. On presentation BNP (3168). He was continued on metoprolol and started on anticoagulation. Echocardiogram noted moderate left ventricular hypertrophy, normal left ventricular systolic function with an ejection fraction of 60 to 65 %, severe pulmonary hypertension. CT angiogram of the chest noted unchanged 3.5 mm left lower lobe nodule, no pulmonary arterial filling defects. The patient underwent cardiac catheterization with stent placement to the proximal LAD, and was noted to have an elevated wedge pressure. He was treated initially with triple therapy (ASA, plavix, and Eliquis) and Furosemide was administered for diuresis.  He underwent JAMA/ DC cardioversion resulting in sinus rhythm. Repeat studies noted improvement in the renal function. His volume status improved, and he was discharged.    He presented to EP office 24 for arrythmia follow up and reports doing much better since hospitalization. He went to rehab initially and then discharged home. He denies palpitations, dizziness or chest pain, continues to have some fatigue, NO 1-2flights and 2+ LE edema but this has improved.  Given his recurrent AFib despite amiodarone therapy and his slow heart rate, he is not the best candidate for antiarrhythmics. He presents to PST today on 10/9/24 for his upcoming scheduled JAMA/Afib ablation with Dr. Ruiz on 10/22/24.   ?  Cardiology Summary   EC24: AFib 51bpm, narrow qrs    Echo: TTE 24 LVEF 69%; LV diastolic function analysis is made challending by the presence of AF; normal RV size and function; LA moderately dilated; mild-mod MR; mild-mod TR; dilated pulmonary artery; estimated PASP 69mmHG; severe pulm HTN; no pericardial effusion seen  JAMA 24: LVSF normal; mild MR: grade 1 spontaneous echo contrast located in MAGGI; no MAGGI thrombus; S/p JAMA CV with successful restoration of sinus rhythm.   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  Cardiac Cath/ PCI 20 LM: normal; mLAD 40%; mCx: 20%; mRCA 99% stenosis s/p 1 DANO (ANA 3.0x18MM)        Heart Failure:        Systolic/Diastolic/Combined: Diastolic       NYHA Class (within 2 weeks): II    Echo: Echo: TTE 24 LVEF 69%; LV diastolic function analysis is made challending by the presence of AF; normal RV size and function; LA moderately dilated; mild-mod MR; mild-mod TR; dilated pulmonary artery; estimated PASP 69mmHG; severe pulm HTN; no pericardial effusion seen         Antiarrhythmic Therapies: Eliquis 5mg BID     Anticoagulation Therapies:  Metoprolol Succinate 25mg ER daily  	  LABS:	 	                                                                             Department of Cardiology                                                                  [facility]/[address]-[address]                                                            Telephone: [phone]. Fax:[phone]                                                                             Pre-EP Procedure H and P      HPI: 73 year old gentleman with history of COPD, former smoker, CKD 3, CAD with prior PCI mRCA 2020(Resolute ana DANO placed 3.0 x18mm), recent hospital admission at [facility] 24-24 with decompensated heart failure in the setting of new onset AFib with RVR and CAD, s/p DANO to pLAD (2.7 x 22 [place] DANO) and JAMA/DCCV 24. The patient initially presented to Dr Salas after noting a one-week history of increasing dyspnea and increasing edema and was found to have AF with RVR. On presentation BNP (3168). He was continued on metoprolol and started on anticoagulation. Echocardiogram noted moderate left ventricular hypertrophy, normal left ventricular systolic function with an ejection fraction of 60 to 65 %, severe pulmonary hypertension. CT angiogram of the chest noted unchanged 3.5 mm left lower lobe nodule, no pulmonary arterial filling defects. The patient underwent cardiac catheterization with stent placement to the proximal LAD, and was noted to have an elevated wedge pressure. He was treated initially with triple therapy (ASA, plavix, and Eliquis) and Furosemide was administered for diuresis.  He underwent JAMA/ DCCV resulting in sinus rhythm. Repeat studies noted improvement in the renal function. His volume status improved, and he was discharged.    He presented to EP office 24 for arrythmia follow up and reports doing much better since hospitalization. He went to rehab initially and then discharged home. He denies palpitations, dizziness or chest pain, continues to have some fatigue, NO 1-2flights and 2+ LE edema but this has improved.  Given his recurrent AFib despite amiodarone therapy and his slow heart rate, he is not the best candidate for antiarrhythmics. He presents to PST today on 10/9/24 for his upcoming scheduled JAMA/Afib ablation with Dr. Ruiz on 10/22/24.   ?  Cardiology Summary   EC24: AFib 51bpm, narrow qrs    Echo: TTE 24 LVEF 69%; LV diastolic function analysis is made challending by the presence of AF; normal RV size and function; LA moderately dilated; mild-mod MR; mild-mod TR; dilated pulmonary artery; estimated PASP 69mmHG; severe pulm HTN; no pericardial effusion seen  JAMA 24: LVSF normal; mild MR: grade 1 spontaneous echo contrast located in MAGGI; no MAGGI thrombus; S/p JAMA CV with successful restoration of sinus rhythm.   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  Cardiac Cath/ PCI 20 LM: normal; mLAD 40%; mCx: 20%; mRCA 99% stenosis s/p 1 DANO (ANA 3.0x18MM)        Heart Failure:        Systolic/Diastolic/Combined: Diastolic       NYHA Class (within 2 weeks): II    Echo: Echo: TTE 24 LVEF 69%; LV diastolic function analysis is made challending by the presence of AF; normal RV size and function; LA moderately dilated; mild-mod MR; mild-mod TR; dilated pulmonary artery; estimated PASP 69mmHG; severe pulm HTN; no pericardial effusion seen         Antiarrhythmic Therapies: Eliquis 5mg BID     Anticoagulation Therapies:  Metoprolol Succinate 25mg ER daily  	  LABS:	 	                                                                             Department of Cardiology                                                                  [facility]/[place] [address] [address]                                                            Telephone: [phone]. Fax:[phone]                                                                             Pre-EP Procedure H and P      HPI: 73 year old gentleman retired  with history of  former smoker (smoked 1/2-1PPD x 10 years, quit 40 years ago), HTN; COPD; Pulmonary HTN; CKD 3, CAD with prior PCI mRCA 2020(Resolute ana DANO placed 3.0 x18mm), recent hospital admission at [facility] 24-24 with decompensated heart failure in the setting of new onset AFib with RVR and CAD, s/p DANO to pLAD (2.7 x 22 [place] DANO) and JAMA/DCCV 24. The patient initially presented to Dr Salas after noting a one-week history of increasing dyspnea and increasing edema and was found to have AF with RVR. On presentation BNP (3168). He was continued on metoprolol and started on anticoagulation. Echocardiogram noted moderate left ventricular hypertrophy, normal left ventricular systolic function with an ejection fraction of 60 to 65 %, severe pulmonary hypertension. CT angiogram of the chest noted unchanged 3.5 mm left lower lobe nodule, no pulmonary arterial filling defects. The patient underwent cardiac catheterization with stent placement to the proximal LAD, and was noted to have an elevated wedge pressure. He was treated initially with triple therapy (ASA, plavix, and Eliquis) and Furosemide was administered for diuresis.  He underwent JAMA/ DCCV resulting in sinus rhythm. Repeat studies noted improvement in the renal function. His volume status improved, and he was discharged to rehab for a few weeks. Then ultimately on 3/24/24 he was discharged home.     He presented to EP office 24 for arrythmia follow up and reports doing much better since hospitalization. He denies palpitations, dizziness or chest pain, continues to have some fatigue, NO 1-2flights and 2+ LE edema but this has improved.  Given his recurrent AFib despite amiodarone therapy and his slow heart rate, he is not the best candidate for antiarrhythmics. He presents to PST today on 10/9/24 for his upcoming scheduled JAMA/Afib ablation with Dr. Ruiz on 10/22/24. He reports he is compliant with his eliquis and denies any major bleeding events including hematemesis; hematochezia; BRBPR; melena.   ?  Cardiology Summary   EC24: AFib 51bpm, narrow qrs    Echo: TTE 24 LVEF 69%; LV diastolic function analysis is made challending by the presence of AF; normal RV size and function; LA moderately dilated; mild-mod MR; mild-mod TR; dilated pulmonary artery; estimated PASP 69mmHG; severe pulm HTN; no pericardial effusion seen  JAMA 24: LVSF normal; mild MR: grade 1 spontaneous echo contrast located in MAGGI; no MAGGI thrombus; S/p JAMA CV with successful restoration of sinus rhythm.   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  Cardiac Cath/ PCI 20 LM: normal; mLAD 40%; mCx: 20%; mRCA 99% stenosis s/p 1 DANO (ANA 3.0x18MM)    Heart Failure:        Systolic/Diastolic/Combined: Diastolic       NYHA Class (within 2 weeks): II    Echo: Echo: TTE 24 LVEF 69%; LV diastolic function analysis is made challenging by the presence of AF; normal RV size and function; LA moderately dilated; mild-mod MR; mild-mod TR; dilated pulmonary artery; estimated PASP 69mmHG; severe pulm HTN; no pericardial effusion seen         Antiarrhythmic Therapies: Eliquis 5mg BID     Anticoagulation Therapies:  Metoprolol Succinate 50mg ER daily; amiodarone 200mg daily  	  LABS:	 	                                                                             Department of Cardiology                                                                  [facility]/[place] [address] [address]                                                            Telephone: [phone]. Fax:[phone]                                                                             Pre-EP Procedure H and P      HPI: 73 year old gentleman retired  with history of  former smoker (smoked 1/2-1PPD x 10 years, quit 40 years ago), HTN; COPD; Pulmonary HTN; CKD 3, CAD with prior PCI mRCA 2020(Resolute ana DANO placed 3.0 x18mm), recent hospital admission at [facility] 24-24 with decompensated heart failure in the setting of new onset AFib with RVR and CAD, s/p DANO to pLAD (2.7 x 22 [place] DANO) and JAMA/DCCV 24. The patient initially presented to Dr Salas after noting a one-week history of increasing dyspnea and increasing edema and was found to have AF with RVR. On presentation BNP (3168). He was continued on metoprolol and started on anticoagulation. Echocardiogram noted moderate left ventricular hypertrophy, normal left ventricular systolic function with an ejection fraction of 60 to 65 %, severe pulmonary hypertension. CT angiogram of the chest noted unchanged 3.5 mm left lower lobe nodule, no pulmonary arterial filling defects. The patient underwent cardiac catheterization with stent placement to the proximal LAD, and was noted to have an elevated wedge pressure. He was treated initially with triple therapy (ASA, plavix, and Eliquis) and Furosemide was administered for diuresis.  He underwent JAMA/ DCCV resulting in sinus rhythm. Repeat studies noted improvement in the renal function. His volume status improved, and he was discharged to rehab for a few weeks. Then ultimately on 3/24/24 he was discharged home.     He presented to EP office 24 for arrythmia follow up and reports doing much better since hospitalization. He denies palpitations, dizziness or chest pain, continues to have some fatigue, NO 1-2flights and 2+ LE edema but this has improved.  Given his recurrent AFib despite amiodarone therapy and his slow heart rate, he is not the best candidate for antiarrhythmics. He presents to PST today on 10/9/24 for his upcoming scheduled JAMA/Afib ablation with Dr. Ruiz on 10/22/24. He reports he is compliant with his eliquis and denies any major bleeding events including hematemesis; hematochezia; BRBPR; melena.   ?  Cardiology Summary   EC24: AFib 51bpm, narrow qrs    Echo: TTE 24 LVEF 69%; LV diastolic function analysis is made challending by the presence of AF; normal RV size and function; LA moderately dilated; mild-mod MR; mild-mod TR; dilated pulmonary artery; estimated PASP 69mmHG; severe pulm HTN; no pericardial effusion seen  JAMA 24: LVSF normal; mild MR: grade 1 spontaneous echo contrast located in MAGGI; no MAGGI thrombus; S/p JAMA CV with successful restoration of sinus rhythm.   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  Cardiac Cath/ PCI 20 LM: normal; mLAD 40%; mCx: 20%; mRCA 99% stenosis s/p 1 DANO (ANA 3.0x18MM)    Heart Failure:        Systolic/Diastolic/Combined: Diastolic       NYHA Class (within 2 weeks): II    Echo: Echo: TTE 24 LVEF 69%; LV diastolic function analysis is made challenging by the presence of AF; normal RV size and function; LA moderately dilated; mild-mod MR; mild-mod TR; dilated pulmonary artery; estimated PASP 69mmHG; severe pulm HTN; no pericardial effusion seen         Antiarrhythmic Therapies: Eliquis 5mg BID     Anticoagulation Therapies:  Metoprolol Succinate 50mg ER daily; amiodarone 200mg daily  	  LABS:	 	                        14.3   7.06  )-----------( 185      ( 09 Oct 2024 13:50 )             45.2     PT/INR - ( 09 Oct 2024 13:50 )   PT: 22.7 sec;   INR: 1.97 ratio         PTT - ( 09 Oct 2024 13:50 )  PTT:33.3 sec                                                                           Department of Cardiology                                                                  [facility]/[place] [address] [address]                                                            Telephone: [phone]. Fax:[phone]                                                                             Pre-EP Procedure H and P      HPI: 73 year old gentleman retired  with history of  former smoker (smoked 1/2-1PPD x 10 years, quit 40 years ago), HTN; COPD; Pulmonary HTN; CKD 3, CAD with prior PCI mRCA 2020(Resolute ana DANO placed 3.0 x18mm), recent hospital admission at [facility] 24-24 with decompensated heart failure in the setting of new onset AFib with RVR and CAD, s/p DANO to pLAD (2.7 x 22 [place] DANO) and JAMA/DCCV 24. The patient initially presented to Dr Salas after noting a one-week history of increasing dyspnea and increasing edema and was found to have AF with RVR. On presentation BNP (3168). He was continued on metoprolol and started on anticoagulation. Echocardiogram noted moderate left ventricular hypertrophy, normal left ventricular systolic function with an ejection fraction of 60 to 65 %, severe pulmonary hypertension. CT angiogram of the chest noted unchanged 3.5 mm left lower lobe nodule, no pulmonary arterial filling defects. The patient underwent cardiac catheterization with stent placement to the proximal LAD, and was noted to have an elevated wedge pressure. He was treated initially with triple therapy (ASA, plavix, and Eliquis) and Furosemide was administered for diuresis.  He underwent JAMA/ DCCV resulting in sinus rhythm. Repeat studies noted improvement in the renal function. His volume status improved, and he was discharged to rehab for a few weeks. Then ultimately on 3/24/24 he was discharged home.     He presented to EP office 24 for arrythmia follow up and reports doing much better since hospitalization. He denies palpitations, dizziness or chest pain, continues to have some fatigue, NO 1-2flights and 2+ LE edema but this has improved.  Given his recurrent AFib despite amiodarone therapy and his slow heart rate, he is not the best candidate for antiarrhythmics. He presents to PST today on 10/9/24 for his upcoming scheduled JAMA/Afib ablation with Dr. Ruiz on 10/22/24. He reports he is compliant with his eliquis and denies any major bleeding events including hematemesis; hematochezia; BRBPR; melena.   ?  Cardiology Summary   EC24: AFib 51bpm, narrow qrs    Echo: TTE 24 LVEF 69%; LV diastolic function analysis is made challending by the presence of AF; normal RV size and function; LA moderately dilated; mild-mod MR; mild-mod TR; dilated pulmonary artery; estimated PASP 69mmHG; severe pulm HTN; no pericardial effusion seen  JAMA 24: LVSF normal; mild MR: grade 1 spontaneous echo contrast located in MAGGI; no MAGGI thrombus; S/p JAMA CV with successful restoration of sinus rhythm.   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  Cardiac Cath/ PCI 20 LM: normal; mLAD 40%; mCx: 20%; mRCA 99% stenosis s/p 1 DANO (ANA 3.0x18MM)    Heart Failure:        Systolic/Diastolic/Combined: Diastolic       NYHA Class (within 2 weeks): II    Echo: Echo: TTE 24 LVEF 69%; LV diastolic function analysis is made challenging by the presence of AF; normal RV size and function; LA moderately dilated; mild-mod MR; mild-mod TR; dilated pulmonary artery; estimated PASP 69mmHG; severe pulm HTN; no pericardial effusion seen         Antiarrhythmic Therapies: Eliquis 5mg BID     Anticoagulation Therapies:  Metoprolol Succinate 50mg ER daily; amiodarone 200mg daily  	  LABS:	 	                        14.3   7.06  )-----------( 185      ( 09 Oct 2024 13:50 )             45.2     PT/INR - ( 09 Oct 2024 13:50 )   PT: 22.7 sec;   INR: 1.97 ratio         PTT - ( 09 Oct 2024 13:50 )  PTT:33.3 sec    10-09    139  |  100  |  30.7[H]  ----------------------------<  96  4.8   |  26.0  |  1.63[H]    Ca    9.1      09 Oct 2024 13:50  Mg     2.0     10-09    TPro  7.4  /  Alb  4.1  /  TBili  1.2  /  DBili  x   /  AST  33  /  ALT  26  /  AlkPhos  135[H]  10-09

## 2024-10-14 ENCOUNTER — APPOINTMENT (OUTPATIENT)
Dept: PULMONOLOGY | Facility: CLINIC | Age: 74
End: 2024-10-14
Payer: MEDICARE

## 2024-10-14 ENCOUNTER — MED ADMIN CHARGE (OUTPATIENT)
Age: 74
End: 2024-10-14

## 2024-10-14 VITALS
DIASTOLIC BLOOD PRESSURE: 76 MMHG | SYSTOLIC BLOOD PRESSURE: 166 MMHG | BODY MASS INDEX: 37.16 KG/M2 | HEART RATE: 54 BPM | HEIGHT: 66.5 IN | OXYGEN SATURATION: 94 % | WEIGHT: 234 LBS

## 2024-10-14 DIAGNOSIS — I27.21 SECONDARY PULMONARY ARTERIAL HYPERTENSION: ICD-10-CM

## 2024-10-14 DIAGNOSIS — Z23 ENCOUNTER FOR IMMUNIZATION: ICD-10-CM

## 2024-10-14 LAB
BASOPHILS # BLD AUTO: 0.1 K/UL
BASOPHILS NFR BLD AUTO: 1.3 %
EOSINOPHIL # BLD AUTO: 0.29 K/UL
EOSINOPHIL NFR BLD AUTO: 3.8 %
HCT VFR BLD CALC: 48 %
HGB BLD-MCNC: 15.2 G/DL
IMM GRANULOCYTES NFR BLD AUTO: 0.5 %
LYMPHOCYTES # BLD AUTO: 1.16 K/UL
LYMPHOCYTES NFR BLD AUTO: 15.2 %
MAN DIFF?: NORMAL
MCHC RBC-ENTMCNC: 31.7 GM/DL
MCHC RBC-ENTMCNC: 32.7 PG
MCV RBC AUTO: 103.2 FL
MONOCYTES # BLD AUTO: 0.76 K/UL
MONOCYTES NFR BLD AUTO: 10 %
NEUTROPHILS # BLD AUTO: 5.27 K/UL
NEUTROPHILS NFR BLD AUTO: 69.2 %
PLATELET # BLD AUTO: 202 K/UL
RBC # BLD: 4.65 M/UL
RBC # FLD: 16.5 %
WBC # FLD AUTO: 7.62 K/UL

## 2024-10-14 PROCEDURE — 36415 COLL VENOUS BLD VENIPUNCTURE: CPT

## 2024-10-14 PROCEDURE — 90662 IIV NO PRSV INCREASED AG IM: CPT

## 2024-10-14 PROCEDURE — G0008: CPT

## 2024-10-14 PROCEDURE — 99215 OFFICE O/P EST HI 40 MIN: CPT

## 2024-10-15 DIAGNOSIS — J98.4 EMPHYSEMA, UNSPECIFIED: ICD-10-CM

## 2024-10-15 DIAGNOSIS — J43.9 EMPHYSEMA, UNSPECIFIED: ICD-10-CM

## 2024-10-15 LAB
ALBUMIN SERPL ELPH-MCNC: 4.4 G/DL
ALP BLD-CCNC: 157 U/L
ALT SERPL-CCNC: 26 U/L
ANION GAP SERPL CALC-SCNC: 16 MMOL/L
AST SERPL-CCNC: 30 U/L
BILIRUB SERPL-MCNC: 1.5 MG/DL
BUN SERPL-MCNC: 33 MG/DL
CALCIUM SERPL-MCNC: 9.6 MG/DL
CHLORIDE SERPL-SCNC: 101 MMOL/L
CO2 SERPL-SCNC: 26 MMOL/L
CREAT SERPL-MCNC: 1.84 MG/DL
EGFR: 38 ML/MIN/1.73M2
GLUCOSE SERPL-MCNC: 98 MG/DL
NT-PROBNP SERPL-MCNC: 6992 PG/ML
POTASSIUM SERPL-SCNC: 4.5 MMOL/L
PROT SERPL-MCNC: 7.4 G/DL
SODIUM SERPL-SCNC: 143 MMOL/L

## 2024-10-15 RX ORDER — BUDESONIDE, GLYCOPYRROLATE, AND FORMOTEROL FUMARATE 160; 9; 4.8 UG/1; UG/1; UG/1
160-9-4.8 AEROSOL, METERED RESPIRATORY (INHALATION)
Qty: 1 | Refills: 11 | Status: ACTIVE | COMMUNITY
Start: 2024-10-15 | End: 1900-01-01

## 2024-10-18 ENCOUNTER — APPOINTMENT (OUTPATIENT)
Dept: PULMONOLOGY | Facility: CLINIC | Age: 74
End: 2024-10-18
Payer: MEDICARE

## 2024-10-22 ENCOUNTER — INPATIENT (INPATIENT)
Facility: HOSPITAL | Age: 74
LOS: 0 days | Discharge: HOME | DRG: 310 | End: 2024-10-23
Attending: STUDENT IN AN ORGANIZED HEALTH CARE EDUCATION/TRAINING PROGRAM | Admitting: STUDENT IN AN ORGANIZED HEALTH CARE EDUCATION/TRAINING PROGRAM
Payer: MEDICARE

## 2024-10-22 ENCOUNTER — TRANSCRIPTION ENCOUNTER (OUTPATIENT)
Age: 74
End: 2024-10-22

## 2024-10-22 VITALS
OXYGEN SATURATION: 95 % | TEMPERATURE: 98 F | HEART RATE: 59 BPM | WEIGHT: 238.1 LBS | HEIGHT: 69 IN | SYSTOLIC BLOOD PRESSURE: 164 MMHG | DIASTOLIC BLOOD PRESSURE: 80 MMHG | RESPIRATION RATE: 16 BRPM

## 2024-10-22 DIAGNOSIS — Z98.89 OTHER SPECIFIED POSTPROCEDURAL STATES: Chronic | ICD-10-CM

## 2024-10-22 DIAGNOSIS — I48.91 UNSPECIFIED ATRIAL FIBRILLATION: ICD-10-CM

## 2024-10-22 DIAGNOSIS — Z95.5 PRESENCE OF CORONARY ANGIOPLASTY IMPLANT AND GRAFT: Chronic | ICD-10-CM

## 2024-10-22 DIAGNOSIS — Z92.89 PERSONAL HISTORY OF OTHER MEDICAL TREATMENT: Chronic | ICD-10-CM

## 2024-10-22 DIAGNOSIS — Z98.890 OTHER SPECIFIED POSTPROCEDURAL STATES: Chronic | ICD-10-CM

## 2024-10-22 LAB
ABO RH CONFIRMATION: SIGNIFICANT CHANGE UP
ANION GAP SERPL CALC-SCNC: 13 MMOL/L — SIGNIFICANT CHANGE UP (ref 5–17)
BUN SERPL-MCNC: 32.2 MG/DL — HIGH (ref 8–20)
CALCIUM SERPL-MCNC: 9.6 MG/DL — SIGNIFICANT CHANGE UP (ref 8.4–10.5)
CHLORIDE SERPL-SCNC: 101 MMOL/L — SIGNIFICANT CHANGE UP (ref 96–108)
CO2 SERPL-SCNC: 26 MMOL/L — SIGNIFICANT CHANGE UP (ref 22–29)
CREAT SERPL-MCNC: 1.55 MG/DL — HIGH (ref 0.5–1.3)
EGFR: 47 ML/MIN/1.73M2 — LOW
GLUCOSE SERPL-MCNC: 90 MG/DL — SIGNIFICANT CHANGE UP (ref 70–99)
POTASSIUM SERPL-MCNC: 4.8 MMOL/L — SIGNIFICANT CHANGE UP (ref 3.5–5.3)
POTASSIUM SERPL-SCNC: 4.8 MMOL/L — SIGNIFICANT CHANGE UP (ref 3.5–5.3)
SODIUM SERPL-SCNC: 140 MMOL/L — SIGNIFICANT CHANGE UP (ref 135–145)

## 2024-10-22 PROCEDURE — 93010 ELECTROCARDIOGRAM REPORT: CPT

## 2024-10-22 PROCEDURE — 93656 COMPRE EP EVAL ABLTJ ATR FIB: CPT

## 2024-10-22 PROCEDURE — 93657 TX L/R ATRIAL FIB ADDL: CPT

## 2024-10-22 RX ORDER — APIXABAN 5 MG/1
5 TABLET, FILM COATED ORAL EVERY 12 HOURS
Refills: 0 | Status: DISCONTINUED | OUTPATIENT
Start: 2024-10-22 | End: 2024-10-23

## 2024-10-22 RX ORDER — MAGNESIUM, ALUMINUM HYDROXIDE 200-200 MG
30 TABLET,CHEWABLE ORAL EVERY 4 HOURS
Refills: 0 | Status: DISCONTINUED | OUTPATIENT
Start: 2024-10-22 | End: 2024-10-23

## 2024-10-22 RX ORDER — TIOTROPIUM BROMIDE INHALATION SPRAY 1.56 UG/1
2 SPRAY, METERED RESPIRATORY (INHALATION) DAILY
Refills: 0 | Status: DISCONTINUED | OUTPATIENT
Start: 2024-10-22 | End: 2024-10-23

## 2024-10-22 RX ORDER — FLUTICASONE FUROATE, UMECLIDINIUM BROMIDE AND VILANTEROL TRIFENATATE 100; 62.5; 25 UG/1; UG/1; UG/1
1 POWDER RESPIRATORY (INHALATION)
Refills: 0 | DISCHARGE

## 2024-10-22 RX ORDER — ACETAMINOPHEN 500 MG
650 TABLET ORAL EVERY 6 HOURS
Refills: 0 | Status: DISCONTINUED | OUTPATIENT
Start: 2024-10-22 | End: 2024-10-23

## 2024-10-22 RX ORDER — FLUTICASONE PROPIONATE AND SALMETEROL XINAFOATE 230; 21 UG/1; UG/1
1 AEROSOL, METERED RESPIRATORY (INHALATION)
Refills: 0 | Status: DISCONTINUED | OUTPATIENT
Start: 2024-10-22 | End: 2024-10-23

## 2024-10-22 RX ORDER — METOPROLOL TARTRATE 50 MG
50 TABLET ORAL DAILY
Refills: 0 | Status: DISCONTINUED | OUTPATIENT
Start: 2024-10-22 | End: 2024-10-22

## 2024-10-22 RX ORDER — METOPROLOL TARTRATE 50 MG
25 TABLET ORAL DAILY
Refills: 0 | Status: DISCONTINUED | OUTPATIENT
Start: 2024-10-23 | End: 2024-10-23

## 2024-10-22 RX ORDER — CLOPIDOGREL 75 MG/1
75 TABLET ORAL DAILY
Refills: 0 | Status: DISCONTINUED | OUTPATIENT
Start: 2024-10-22 | End: 2024-10-23

## 2024-10-22 RX ORDER — FUROSEMIDE 10 MG/ML
1 INJECTION INTRAVENOUS
Refills: 0 | DISCHARGE

## 2024-10-22 RX ORDER — SODIUM CHLORIDE 9 MG/ML
1000 INJECTION, SOLUTION INTRAMUSCULAR; INTRAVENOUS; SUBCUTANEOUS
Refills: 0 | Status: DISCONTINUED | OUTPATIENT
Start: 2024-10-22 | End: 2024-10-23

## 2024-10-22 RX ORDER — ALPRAZOLAM 0.25 MG
0.25 TABLET ORAL EVERY 6 HOURS
Refills: 0 | Status: DISCONTINUED | OUTPATIENT
Start: 2024-10-22 | End: 2024-10-23

## 2024-10-22 RX ORDER — OXYCODONE AND ACETAMINOPHEN 7.5; 325 MG/1; MG/1
1 TABLET ORAL EVERY 6 HOURS
Refills: 0 | Status: DISCONTINUED | OUTPATIENT
Start: 2024-10-22 | End: 2024-10-23

## 2024-10-22 RX ORDER — PANTOPRAZOLE SODIUM 40 MG/1
40 TABLET, DELAYED RELEASE ORAL
Refills: 0 | Status: DISCONTINUED | OUTPATIENT
Start: 2024-10-22 | End: 2024-10-23

## 2024-10-22 RX ADMIN — SODIUM CHLORIDE 50 MILLILITER(S): 9 INJECTION, SOLUTION INTRAMUSCULAR; INTRAVENOUS; SUBCUTANEOUS at 16:00

## 2024-10-22 RX ADMIN — APIXABAN 5 MILLIGRAM(S): 5 TABLET, FILM COATED ORAL at 15:27

## 2024-10-22 RX ADMIN — Medication 40 MILLIGRAM(S): at 20:35

## 2024-10-22 RX ADMIN — CLOPIDOGREL 75 MILLIGRAM(S): 75 TABLET ORAL at 14:51

## 2024-10-22 RX ADMIN — SODIUM CHLORIDE 50 MILLILITER(S): 9 INJECTION, SOLUTION INTRAMUSCULAR; INTRAVENOUS; SUBCUTANEOUS at 20:35

## 2024-10-22 NOTE — DISCHARGE NOTE PROVIDER - CARE PROVIDER_API CALL
Tony Ruiz  Cardiac Electrophysiology  [address] [place] [address]  Phone: [phone]  Fax: [phone]  Follow Up Time:

## 2024-10-22 NOTE — DISCHARGE NOTE PROVIDER - NSDCFUSCHEDAPPT_GEN_ALL_CORE_FT
Viri Flynn  [facility]  NEPHRO [address]  Scheduled Appointment: 11/11/2024

## 2024-10-22 NOTE — DISCHARGE NOTE PROVIDER - NSDCFUADDINST_GEN_ALL_CORE_FT
Follow up with Dr. Ruiz in 3-4 weeks, or sooner if needed. Our office will contact you in 3-5 days to schedule this appointment. Please call [phone] with questions or concerns.

## 2024-10-22 NOTE — PROGRESS NOTE ADULT - SUBJECTIVE AND OBJECTIVE BOX
PROCEDURE(S): FARAPULSE Pulsed Field Ablation of Atrial Fibrillation    ELECTROPHYSIOLOGIST(S): Tony Ruiz MD         COMPLICATIONS:  none        DISPOSITION: observation      CONDITION: stable    Pt doing well s/p FARAPULSE pulsed field ablation of atrial fibrillation & flutter (PVI, PW, CTI) via b/l [facility] access. Denies complaint post procedure.     I/Os: net + [phone]    MEDICATIONS  (STANDING):  apixaban 5 milliGRAM(s) Oral every 12 hours  atorvastatin 40 milliGRAM(s) Oral at bedtime  clopidogrel Tablet 75 milliGRAM(s) Oral daily  fluticasone propionate/ salmeterol 100-50 MICROgram(s) Diskus 1 Dose(s) Inhalation two times a day  pantoprazole    Tablet 40 milliGRAM(s) Oral before breakfast  sodium chloride 0.9%. 1000 milliLiter(s) (50 mL/Hr) IV Continuous <Continuous>  tiotropium 2.5 MICROgram(s) Inhaler 2 Puff(s) Inhalation daily    MEDICATIONS  (PRN):  acetaminophen     Tablet .. 650 milliGRAM(s) Oral every 6 hours PRN Mild Pain (1 - 3), Moderate Pain (4 - 6)  ALPRAZolam 0.25 milliGRAM(s) Oral every 6 hours PRN anxiety/insomnia  aluminum hydroxide/magnesium hydroxide/simethicone Suspension 30 milliLiter(s) Oral every 4 hours PRN Dyspepsia  benzocaine/menthol Lozenge 1 Lozenge Oral every 2 hours PRN Sore Throat  oxycodone    5 mG/acetaminophen 325 mG 1 Tablet(s) Oral every 6 hours PRN Severe Pain (7 - 10)    Allergies:  No Known Allergies    T(C): 36.4 (10-22-24 @ 07:06), Max: 36.4 (10-22-24 @ 07:06)  HR: 59 (10-22-24 @ 07:06) (59 - 59)  BP: 164/80 (10-22-24 @ 07:06) (164/80 - 164/80)  RR: 16 (10-22-24 @ 07:06) (16 - 16)  SpO2: 95% (10-22-24 @ 07:06) (95% - 95%)  Post-procedure VS: /60 HR 44 O2 sat 90% on RA RR 14     Physical exam:   awake, alert, no obvious distress   Card: S1/S2, RRR, no m/g/r  Resp: lungs CTA b/l  Abd: S/NT/ND  Groins: hemostatic sutures in place; sites C/D/I; no bleeding, hematoma, erythema, exudate or edema  Ext: no edema; distal pulses intact  Skin: warm, dry, no rash     ECG: sinus bradycardia 44 bpm    Assessment:   73 year old male, former smoker, with HTN, COPD, pulmonary HTN, CKD 3, CAD s/p prior PCI mRCA and recent hospitalization with decompensated heart failure in the set of new onset AF w/ RVR and CAD s/p DANO to pLAD and JAMA/DCCV 2/14/24. He has recurrent AF despite amiodarone therapy. He presented electively and is now status post uncomplicated FARAPULSE pulsed field ablation of atrial fibrillation & flutter (PVI, PW, CTI). Resting comfortably.     Plan:   Bedrest x 4 hours, then OOB with assistance and progress as tolerated.   Groin sutures to be removed by EP service in AM.   Pending groin status, resume Eliquis 5mg Q 12 hrs @ 15:30   DO NOT HOLD, INTERRUPT OR REVERSE ANTICOAGULATION WITHOUT EXPLICIT APPROVAL FROM EP SERVICE.   Reduce Toprol to 25mg daily.   Discontinue amiodarone.   Start Protonix 40mg once daily for 1 week.   Continue other home medications.   Normal saline @ 50ml/hr overnight.   Strict I/Os.  Please encourage incentive spirometry and ambulation once able.  Observation and monitoring on telemetry overnight with anticipated discharge in the AM and outpt follow up in 2-4 weeks.   PROCEDURE(S): FARAPULSE Pulsed Field Ablation of Atrial Fibrillation    ELECTROPHYSIOLOGIST(S): Tony Ruiz MD         COMPLICATIONS:  none        DISPOSITION: observation      CONDITION: stable    Pt doing well s/p FARAPULSE pulsed field ablation of atrial fibrillation & flutter (PVI, PW, CTI) via b/l [facility] access. Denies complaint post procedure.     I/Os: net + 960    MEDICATIONS  (STANDING):  apixaban 5 milliGRAM(s) Oral every 12 hours  atorvastatin 40 milliGRAM(s) Oral at bedtime  clopidogrel Tablet 75 milliGRAM(s) Oral daily  fluticasone propionate/ salmeterol 100-50 MICROgram(s) Diskus 1 Dose(s) Inhalation two times a day  pantoprazole    Tablet 40 milliGRAM(s) Oral before breakfast  sodium chloride 0.9%. 1000 milliLiter(s) (50 mL/Hr) IV Continuous <Continuous>  tiotropium 2.5 MICROgram(s) Inhaler 2 Puff(s) Inhalation daily    MEDICATIONS  (PRN):  acetaminophen     Tablet .. 650 milliGRAM(s) Oral every 6 hours PRN Mild Pain (1 - 3), Moderate Pain (4 - 6)  ALPRAZolam 0.25 milliGRAM(s) Oral every 6 hours PRN anxiety/insomnia  aluminum hydroxide/magnesium hydroxide/simethicone Suspension 30 milliLiter(s) Oral every 4 hours PRN Dyspepsia  benzocaine/menthol Lozenge 1 Lozenge Oral every 2 hours PRN Sore Throat  oxycodone    5 mG/acetaminophen 325 mG 1 Tablet(s) Oral every 6 hours PRN Severe Pain (7 - 10)    Allergies:  No Known Allergies    T(C): 36.4 (10-22-24 @ 07:06), Max: 36.4 (10-22-24 @ 07:06)  HR: 59 (10-22-24 @ 07:06) (59 - 59)  BP: 164/80 (10-22-24 @ 07:06) (164/80 - 164/80)  RR: 16 (10-22-24 @ 07:06) (16 - 16)  SpO2: 95% (10-22-24 @ 07:06) (95% - 95%)  Post-procedure VS: /60 HR 44 O2 sat 90% on RA RR 14     Physical exam:   awake, alert, no obvious distress   Card: S1/S2, RRR, no m/g/r  Resp: lungs CTA b/l  Abd: S/NT/ND  Groins: hemostatic sutures in place; sites C/D/I; no bleeding, hematoma, erythema, exudate or edema  Ext: no edema; distal pulses intact  Skin: warm, dry, no rash     ECG: sinus bradycardia 44 bpm, prolonged QTc    Assessment:   73 year old male, former smoker, with HTN, COPD, pulmonary HTN, CKD 3, CAD s/p prior PCI mRCA and recent hospitalization with decompensated heart failure in the set of new onset AF w/ RVR and CAD s/p DANO to pLAD and JAMA/DCCV 2/14/24. He has recurrent AF despite amiodarone therapy. He presented electively and is now status post uncomplicated FARAPULSE pulsed field ablation of atrial fibrillation & flutter (PVI, PW, CTI). Resting comfortably.     Plan:   Bedrest x 4 hours, then OOB with assistance and progress as tolerated.   Groin sutures to be removed by EP service in AM.   Pending groin status, resume Eliquis 5mg Q 12 hrs @ 15:30   DO NOT HOLD, INTERRUPT OR REVERSE ANTICOAGULATION WITHOUT EXPLICIT APPROVAL FROM EP SERVICE.   Reduce Toprol to 25mg daily.   Discontinue amiodarone.   Start Protonix 40mg once daily for 1 week.   Continue other home medications.   Normal saline @ 50ml/hr overnight.   Strict I/Os.  Please encourage incentive spirometry and ambulation once able.  Observation and monitoring on telemetry overnight with anticipated discharge in the AM and outpt follow up in 2-4 weeks.

## 2024-10-22 NOTE — PROGRESS NOTE ADULT - SUBJECTIVE AND OBJECTIVE BOX
73 year old male, former smoker, with HTN, COPD, pulmonary HTN, CKD 3, CAD s/p prior PCI mRCA and recent hospitalization with decompensated heart failure in the set of new onset AF w/ RVR and CAD s/p DANO to pLAD and JAMA/DCCV 2/14/24. He has recurrent AF despite amiodarone therapy. He presents today for elective catheter ablation of atrial fibrillation.     PST and labs from 10/9/24 reviewed, denies interval history.  Confirms NPO > 8 hrs. Last dose Eliquis 5mg 10/21/24 PM.     - iv heplock  - confirmatory type and screen  - 2 units PRBC on hold  - consent w/ MD

## 2024-10-22 NOTE — DISCHARGE NOTE PROVIDER - HOSPITAL COURSE
73 year old male, former smoker, with HTN, COPD, pulmonary HTN, CKD 3, CAD s/p prior PCI mRCA and recent hospitalization with decompensated heart failure in the set of new onset AF w/ RVR and CAD s/p DANO to pLAD and JAMA/DCCV 2/14/24. He has recurrent AF despite amiodarone therapy. He presented electively and is now status post uncomplicated FARAPULSE pulsed field ablation of atrial fibrillation & flutter (PVI, PW, CTI). The patient was observed overnight without event and was discharged home the following morning with a plan for outpatient follow up.

## 2024-10-22 NOTE — DISCHARGE NOTE PROVIDER - NSDCMRMEDTOKEN_GEN_ALL_CORE_FT
apixaban 5 mg oral tablet: 1 tab(s) orally 2 times a day  clopidogrel 75 mg oral tablet: 1 tab(s) orally once a day  furosemide 20 mg oral tablet: 1 tab(s) orally every other day  Lipitor 40 mg oral tablet: 1 tab(s) orally once a day (at bedtime)  metoprolol succinate 50 mg oral tablet, extended release: 1 tab(s) orally once a day  Trelegy Ellipta 100 mcg-62.5 mcg-25 mcg/inh inhalation powder: 1 puff(s) inhaled once a day   apixaban 5 mg oral tablet: 1 tab(s) orally 2 times a day  clopidogrel 75 mg oral tablet: 1 tab(s) orally once a day  furosemide 20 mg oral tablet: 1 tab(s) orally every other day  Lipitor 40 mg oral tablet: 1 tab(s) orally once a day (at bedtime)  metoprolol succinate 25 mg oral tablet, extended release: 1 tab(s) orally once a day  pantoprazole 40 mg oral delayed release tablet: 1 tab(s) orally once a day (before a meal)  Trelegy Ellipta 100 mcg-62.5 mcg-25 mcg/inh inhalation powder: 1 puff(s) inhaled once a day

## 2024-10-23 ENCOUNTER — TRANSCRIPTION ENCOUNTER (OUTPATIENT)
Age: 74
End: 2024-10-23

## 2024-10-23 VITALS
SYSTOLIC BLOOD PRESSURE: 157 MMHG | RESPIRATION RATE: 18 BRPM | DIASTOLIC BLOOD PRESSURE: 78 MMHG | TEMPERATURE: 98 F | HEART RATE: 58 BPM | OXYGEN SATURATION: 96 %

## 2024-10-23 LAB
ANION GAP SERPL CALC-SCNC: 14 MMOL/L — SIGNIFICANT CHANGE UP (ref 5–17)
BUN SERPL-MCNC: 32.3 MG/DL — HIGH (ref 8–20)
CALCIUM SERPL-MCNC: 8.7 MG/DL — SIGNIFICANT CHANGE UP (ref 8.4–10.5)
CHLORIDE SERPL-SCNC: 103 MMOL/L — SIGNIFICANT CHANGE UP (ref 96–108)
CO2 SERPL-SCNC: 21 MMOL/L — LOW (ref 22–29)
CREAT SERPL-MCNC: 1.27 MG/DL — SIGNIFICANT CHANGE UP (ref 0.5–1.3)
EGFR: 60 ML/MIN/1.73M2 — SIGNIFICANT CHANGE UP
GLUCOSE SERPL-MCNC: 157 MG/DL — HIGH (ref 70–99)
HCT VFR BLD CALC: 39.1 % — SIGNIFICANT CHANGE UP (ref 39–50)
HGB BLD-MCNC: 12.8 G/DL — LOW (ref 13–17)
MAGNESIUM SERPL-MCNC: 2.2 MG/DL — SIGNIFICANT CHANGE UP (ref 1.6–2.6)
MCHC RBC-ENTMCNC: 32.7 GM/DL — SIGNIFICANT CHANGE UP (ref 32–36)
MCHC RBC-ENTMCNC: 32.9 PG — SIGNIFICANT CHANGE UP (ref 27–34)
MCV RBC AUTO: 100.5 FL — HIGH (ref 80–100)
PLATELET # BLD AUTO: 170 K/UL — SIGNIFICANT CHANGE UP (ref 150–400)
POTASSIUM SERPL-MCNC: 4.5 MMOL/L — SIGNIFICANT CHANGE UP (ref 3.5–5.3)
POTASSIUM SERPL-SCNC: 4.5 MMOL/L — SIGNIFICANT CHANGE UP (ref 3.5–5.3)
RBC # BLD: 3.89 M/UL — LOW (ref 4.2–5.8)
RBC # FLD: 16.5 % — HIGH (ref 10.3–14.5)
SODIUM SERPL-SCNC: 138 MMOL/L — SIGNIFICANT CHANGE UP (ref 135–145)
WBC # BLD: 7.51 K/UL — SIGNIFICANT CHANGE UP (ref 3.8–10.5)
WBC # FLD AUTO: 7.51 K/UL — SIGNIFICANT CHANGE UP (ref 3.8–10.5)

## 2024-10-23 PROCEDURE — 36415 COLL VENOUS BLD VENIPUNCTURE: CPT

## 2024-10-23 PROCEDURE — C1733: CPT

## 2024-10-23 PROCEDURE — C1732: CPT

## 2024-10-23 PROCEDURE — C1759: CPT

## 2024-10-23 PROCEDURE — C1766: CPT

## 2024-10-23 PROCEDURE — 93005 ELECTROCARDIOGRAM TRACING: CPT

## 2024-10-23 PROCEDURE — 80048 BASIC METABOLIC PNL TOTAL CA: CPT

## 2024-10-23 PROCEDURE — C1893: CPT

## 2024-10-23 PROCEDURE — C1731: CPT

## 2024-10-23 PROCEDURE — C1769: CPT

## 2024-10-23 PROCEDURE — 94640 AIRWAY INHALATION TREATMENT: CPT

## 2024-10-23 PROCEDURE — 93010 ELECTROCARDIOGRAM REPORT: CPT

## 2024-10-23 PROCEDURE — 83735 ASSAY OF MAGNESIUM: CPT

## 2024-10-23 PROCEDURE — C9399: CPT

## 2024-10-23 PROCEDURE — 93656 COMPRE EP EVAL ABLTJ ATR FIB: CPT

## 2024-10-23 PROCEDURE — 93657 TX L/R ATRIAL FIB ADDL: CPT

## 2024-10-23 PROCEDURE — 85027 COMPLETE CBC AUTOMATED: CPT

## 2024-10-23 PROCEDURE — C1894: CPT

## 2024-10-23 RX ORDER — METOPROLOL TARTRATE 50 MG
1 TABLET ORAL
Refills: 0 | DISCHARGE

## 2024-10-23 RX ORDER — PANTOPRAZOLE SODIUM 40 MG/1
1 TABLET, DELAYED RELEASE ORAL
Qty: 7 | Refills: 0
Start: 2024-10-23 | End: 2024-10-29

## 2024-10-23 RX ORDER — CLOPIDOGREL 75 MG/1
1 TABLET ORAL
Qty: 30 | Refills: 0
Start: 2024-10-23 | End: 2024-11-21

## 2024-10-23 RX ORDER — METOPROLOL TARTRATE 50 MG
1 TABLET ORAL
Qty: 0 | Refills: 0 | DISCHARGE
Start: 2024-10-23

## 2024-10-23 RX ADMIN — PANTOPRAZOLE SODIUM 40 MILLIGRAM(S): 40 TABLET, DELAYED RELEASE ORAL at 05:08

## 2024-10-23 RX ADMIN — TIOTROPIUM BROMIDE INHALATION SPRAY 2 PUFF(S): 1.56 SPRAY, METERED RESPIRATORY (INHALATION) at 10:09

## 2024-10-23 RX ADMIN — APIXABAN 5 MILLIGRAM(S): 5 TABLET, FILM COATED ORAL at 05:09

## 2024-10-23 RX ADMIN — Medication 25 MILLIGRAM(S): at 05:09

## 2024-10-23 RX ADMIN — SODIUM CHLORIDE 50 MILLILITER(S): 9 INJECTION, SOLUTION INTRAMUSCULAR; INTRAVENOUS; SUBCUTANEOUS at 05:10

## 2024-10-23 NOTE — PROGRESS NOTE ADULT - SUBJECTIVE AND OBJECTIVE BOX
Pt doing well POD #1 s/p atrial fibrillation ablation. No overnight events noted, pt denies complaint. Bilateral groin sutures removed without difficulty, pt tolerated well.     EKG: sinus bradycardia 52 bpm, 1st degree AV block, LAFB   TELE: SB and SR w/ rare APC     PAST MEDICAL & SURGICAL HISTORY:  Hypertension  Wrist pain, right  Obesity  NSVT (nonsustained ventricular tachycardia)  Former cigarette smoker  BPH (benign prostatic hyperplasia)  CAD (coronary artery disease)  Atrial fibrillation  History of heart failure  S/P hernia repair 1980  S/P knee surgery righty scope in 1990 and left meniscus tear in 1977  S/P colonoscopy  H/O prostate biopsy  Stented coronary artery  History of cardioversion    MEDICATIONS  (STANDING):  apixaban 5 milliGRAM(s) Oral every 12 hours  atorvastatin 40 milliGRAM(s) Oral at bedtime  clopidogrel Tablet 75 milliGRAM(s) Oral daily  fluticasone propionate/ salmeterol 100-50 MICROgram(s) Diskus 1 Dose(s) Inhalation two times a day  metoprolol succinate ER 25 milliGRAM(s) Oral daily  pantoprazole    Tablet 40 milliGRAM(s) Oral before breakfast  sodium chloride 0.9%. 1000 milliLiter(s) (50 mL/Hr) IV Continuous <Continuous>  tiotropium 2.5 MICROgram(s) Inhaler 2 Puff(s) Inhalation daily    MEDICATIONS  (PRN):  acetaminophen     Tablet .. 650 milliGRAM(s) Oral every 6 hours PRN Mild Pain (1 - 3), Moderate Pain (4 - 6)  ALPRAZolam 0.25 milliGRAM(s) Oral every 6 hours PRN anxiety/insomnia  aluminum hydroxide/magnesium hydroxide/simethicone Suspension 30 milliLiter(s) Oral every 4 hours PRN Dyspepsia  benzocaine/menthol Lozenge 1 Lozenge Oral every 2 hours PRN Sore Throat  oxycodone    5 mG/acetaminophen 325 mG 1 Tablet(s) Oral every 6 hours PRN Severe Pain (7 - 10)    Allergies:  No Known Allergies    Vital Signs Last 24 Hrs  T(C): 36.3 (23 Oct 2024 05:00), Max: 36.8 (22 Oct 2024 15:52)  T(F): 97.4 (23 Oct 2024 05:00), Max: 98.3 (22 Oct 2024 15:52)  HR: 62 (23 Oct 2024 05:00) (35 - 65)  BP: 157/89 (23 Oct 2024 05:00) (108/61 - 157/89)  BP(mean): 112 (23 Oct 2024 05:00) (89 - 112)  RR: 18 (23 Oct 2024 05:00) (10 - 18)  SpO2: 96% (23 Oct 2024 05:00) (87% - 100%)    Parameters below as of 23 Oct 2024 05:00  Patient On (Oxygen Delivery Method): room air    Physical Exam:  Constitutional: awake, alert, no obvious distress   Cardiovascular: +S1S2 RRR  Pulmonary: CTA b/l, unlabored  Abd: soft NTND +BS  Groins: C/D/I bilaterally; right groin w/ some fullness and ecchymosis but no hematoma. left groin soft, small skin tear in thigh crease   Extremities: no pedal edema, +distal pulses b/l  Skin: warm and dry, no rash   Neuro: CN II-XII grossly intact    LABS:                        12.8   7.51  )-----------( 170      ( 23 Oct 2024 05:54 )             39.1     10-23    138  |  103  |  32.3[H]  ----------------------------<  157[H]  4.5   |  21.0[L]  |  1.27    Ca    8.7      23 Oct 2024 05:54  Mg     2.2     10-23    Urinalysis Basic - ( 23 Oct 2024 05:54 )    Color: x / Appearance: x / SG: x / pH: x  Gluc: 157 mg/dL / Ketone: x  / Bili: x / Urobili: x   Blood: x / Protein: x / Nitrite: x   Leuk Esterase: x / RBC: x / WBC x   Sq Epi: x / Non Sq Epi: x / Bacteria: x    Assessment:   73 year old male, former smoker, with HTN, COPD, pulmonary HTN, CKD 3, CAD s/p prior PCI mRCA and recent hospitalization with decompensated heart failure in the set of new onset AF w/ RVR and CAD s/p DANO to pLAD and JAMA/DCCV 2/14/24. He has recurrent AF despite amiodarone therapy. He presented electively and is now POD#1 s/p uncomplicated FARAPULSE pulsed field ablation of atrial fibrillation & flutter (PVI, PW, CTI).     Plan:   Eliquis 5mg Q 12 hrs @ 15:30   DO NOT HOLD, INTERRUPT OR REVERSE ANTICOAGULATION WITHOUT EXPLICIT APPROVAL FROM EP SERVICE.   Reduce Toprol to 25mg daily.   Discontinue amiodarone.   Start Protonix 40mg once daily for 1 week.   Continue other home medications.   Pt instructed as to activity limitations - no lifting/pushing/pulling >10 lbs or strenuous exercise x 1 week.   Pt instructed as to access site care and f/up - written instructions included in d/c documents.  Stable for d/c home.   Outpt f/up in 2-4 weeks. The office will contact patient to schedule.      [facility]    Drug Name: Gilenya    Patient is being discontinued from [facility] Patient Management Program due to:   · Unable to reach patient after multiple attempts    Goals of care: Ongoing   1. Ensure adherence  2. Minimize side effects  3. Maximize patient’s response to therapy    Follow up plan: No further follow up needed     Candice Stratton, PharmD.  [facility] Coordinator  [id number] [place] [address]  T: [phone] F: [phone]  [email or web address]

## 2024-10-23 NOTE — DISCHARGE NOTE NURSING/CASE MANAGEMENT/SOCIAL WORK - PATIENT PORTAL LINK FT
You can access the FollowMyHealth Patient Portal offered by [facility] by registering at the following website: [email or web address]. By joining [employer]’s FollowMyHealth portal, you will also be able to view your health information using other applications (apps) compatible with our system.

## 2024-11-11 ENCOUNTER — APPOINTMENT (OUTPATIENT)
Dept: NEPHROLOGY | Facility: CLINIC | Age: 74
End: 2024-11-11
Payer: MEDICARE

## 2024-11-11 ENCOUNTER — NON-APPOINTMENT (OUTPATIENT)
Age: 74
End: 2024-11-11

## 2024-11-11 VITALS
DIASTOLIC BLOOD PRESSURE: 82 MMHG | OXYGEN SATURATION: 92 % | HEIGHT: 66.5 IN | BODY MASS INDEX: 36.84 KG/M2 | SYSTOLIC BLOOD PRESSURE: 151 MMHG | WEIGHT: 232 LBS | TEMPERATURE: 97 F | HEART RATE: 73 BPM

## 2024-11-11 DIAGNOSIS — I10 ESSENTIAL (PRIMARY) HYPERTENSION: ICD-10-CM

## 2024-11-11 DIAGNOSIS — N18.32 CHRONIC KIDNEY DISEASE, STAGE 3B: ICD-10-CM

## 2024-11-11 DIAGNOSIS — R60.0 LOCALIZED EDEMA: ICD-10-CM

## 2024-11-11 PROBLEM — I25.10 ATHEROSCLEROTIC HEART DISEASE OF NATIVE CORONARY ARTERY WITHOUT ANGINA PECTORIS: Chronic | Status: ACTIVE | Noted: 2024-10-09

## 2024-11-11 PROBLEM — I48.91 UNSPECIFIED ATRIAL FIBRILLATION: Chronic | Status: ACTIVE | Noted: 2024-10-09

## 2024-11-11 PROBLEM — Z86.79 PERSONAL HISTORY OF OTHER DISEASES OF THE CIRCULATORY SYSTEM: Chronic | Status: ACTIVE | Noted: 2024-10-09

## 2024-11-11 PROCEDURE — G2211 COMPLEX E/M VISIT ADD ON: CPT

## 2024-11-11 PROCEDURE — 99215 OFFICE O/P EST HI 40 MIN: CPT

## 2024-11-12 ENCOUNTER — RX RENEWAL (OUTPATIENT)
Age: 74
End: 2024-11-12

## 2024-11-12 LAB
APPEARANCE: CLEAR
BACTERIA: NEGATIVE /HPF
BILIRUBIN URINE: NEGATIVE
BLOOD URINE: NEGATIVE
CAST: 1 /LPF
COLOR: YELLOW
CREAT SPEC-SCNC: 81 MG/DL
CREAT/PROT UR: 0.2 RATIO
EPITHELIAL CELLS: 0 /HPF
GLUCOSE QUALITATIVE U: NEGATIVE MG/DL
KETONES URINE: NEGATIVE MG/DL
LEUKOCYTE ESTERASE URINE: NEGATIVE
MICROSCOPIC-UA: NORMAL
NITRITE URINE: NEGATIVE
PH URINE: 7
PROT UR-MCNC: 13 MG/DL
PROTEIN URINE: NORMAL MG/DL
RED BLOOD CELLS URINE: 2 /HPF
SPECIFIC GRAVITY URINE: 1.02
UROBILINOGEN URINE: 1 MG/DL
WHITE BLOOD CELLS URINE: 0 /HPF

## 2024-11-19 ENCOUNTER — NON-APPOINTMENT (OUTPATIENT)
Age: 74
End: 2024-11-19

## 2024-12-11 PROBLEM — Z98.890 S/P ABLATION OF ATRIAL FIBRILLATION: Status: ACTIVE | Noted: 2024-12-11

## 2024-12-16 ENCOUNTER — APPOINTMENT (OUTPATIENT)
Dept: ELECTROPHYSIOLOGY | Facility: CLINIC | Age: 74
End: 2024-12-16
Payer: MEDICARE

## 2024-12-16 VITALS
DIASTOLIC BLOOD PRESSURE: 72 MMHG | SYSTOLIC BLOOD PRESSURE: 144 MMHG | HEART RATE: 68 BPM | WEIGHT: 237 LBS | OXYGEN SATURATION: 97 % | BODY MASS INDEX: 33.93 KG/M2 | HEIGHT: 70 IN

## 2024-12-16 DIAGNOSIS — Z86.79 OTHER SPECIFIED POSTPROCEDURAL STATES: ICD-10-CM

## 2024-12-16 DIAGNOSIS — I48.91 UNSPECIFIED ATRIAL FIBRILLATION: ICD-10-CM

## 2024-12-16 DIAGNOSIS — Z98.890 OTHER SPECIFIED POSTPROCEDURAL STATES: ICD-10-CM

## 2024-12-16 PROCEDURE — 99214 OFFICE O/P EST MOD 30 MIN: CPT

## 2024-12-16 PROCEDURE — 93000 ELECTROCARDIOGRAM COMPLETE: CPT | Mod: 59

## 2025-01-02 ENCOUNTER — RX RENEWAL (OUTPATIENT)
Age: 75
End: 2025-01-02

## 2025-01-08 ENCOUNTER — OUTPATIENT (OUTPATIENT)
Dept: OUTPATIENT SERVICES | Facility: HOSPITAL | Age: 75
LOS: 1 days | End: 2025-01-08
Payer: MEDICARE

## 2025-01-08 ENCOUNTER — APPOINTMENT (OUTPATIENT)
Dept: SLEEP CENTER | Facility: CLINIC | Age: 75
End: 2025-01-08
Payer: MEDICARE

## 2025-01-08 DIAGNOSIS — Z98.89 OTHER SPECIFIED POSTPROCEDURAL STATES: Chronic | ICD-10-CM

## 2025-01-08 DIAGNOSIS — Z98.890 OTHER SPECIFIED POSTPROCEDURAL STATES: Chronic | ICD-10-CM

## 2025-01-08 DIAGNOSIS — Z95.5 PRESENCE OF CORONARY ANGIOPLASTY IMPLANT AND GRAFT: Chronic | ICD-10-CM

## 2025-01-08 DIAGNOSIS — Z92.89 PERSONAL HISTORY OF OTHER MEDICAL TREATMENT: Chronic | ICD-10-CM

## 2025-01-08 PROCEDURE — 95800 SLP STDY UNATTENDED: CPT

## 2025-01-08 PROCEDURE — 95800 SLP STDY UNATTENDED: CPT | Mod: 26

## 2025-01-13 DIAGNOSIS — G47.33 OBSTRUCTIVE SLEEP APNEA (ADULT) (PEDIATRIC): ICD-10-CM

## 2025-01-14 DIAGNOSIS — G47.33 OBSTRUCTIVE SLEEP APNEA (ADULT) (PEDIATRIC): ICD-10-CM

## 2025-03-06 DIAGNOSIS — N18.32 CHRONIC KIDNEY DISEASE, STAGE 3B: ICD-10-CM

## 2025-03-11 LAB
BASOPHILS # BLD AUTO: 0.12 K/UL
BASOPHILS NFR BLD AUTO: 1.6 %
EOSINOPHIL # BLD AUTO: 0.37 K/UL
EOSINOPHIL NFR BLD AUTO: 4.8 %
HCT VFR BLD CALC: 44.9 %
HGB BLD-MCNC: 14.4 G/DL
IMM GRANULOCYTES NFR BLD AUTO: 0.4 %
LYMPHOCYTES # BLD AUTO: 1.21 K/UL
LYMPHOCYTES NFR BLD AUTO: 15.8 %
MAN DIFF?: NORMAL
MCHC RBC-ENTMCNC: 32.1 G/DL
MCHC RBC-ENTMCNC: 33.3 PG
MCV RBC AUTO: 103.7 FL
MONOCYTES # BLD AUTO: 0.69 K/UL
MONOCYTES NFR BLD AUTO: 9 %
NEUTROPHILS # BLD AUTO: 5.23 K/UL
NEUTROPHILS NFR BLD AUTO: 68.4 %
PLATELET # BLD AUTO: 207 K/UL
RBC # BLD: 4.33 M/UL
RBC # FLD: 14.3 %
WBC # FLD AUTO: 7.65 K/UL

## 2025-03-12 LAB
25(OH)D3 SERPL-MCNC: 37.4 NG/ML
ALBUMIN SERPL ELPH-MCNC: 4.4 G/DL
ANION GAP SERPL CALC-SCNC: 16 MMOL/L
APPEARANCE: CLEAR
BACTERIA: NEGATIVE /HPF
BILIRUBIN URINE: NEGATIVE
BLOOD URINE: NEGATIVE
BUN SERPL-MCNC: 27 MG/DL
CALCIUM SERPL-MCNC: 9.9 MG/DL
CALCIUM SERPL-MCNC: 9.9 MG/DL
CAST: 1 /LPF
CHLORIDE SERPL-SCNC: 102 MMOL/L
CO2 SERPL-SCNC: 26 MMOL/L
COLOR: YELLOW
CREAT SERPL-MCNC: 1.54 MG/DL
CREAT SPEC-SCNC: 16 MG/DL
CREAT/PROT UR: 0.3 RATIO
EGFRCR SERPLBLD CKD-EPI 2021: 47 ML/MIN/1.73M2
EPITHELIAL CELLS: 0 /HPF
GLUCOSE QUALITATIVE U: NEGATIVE MG/DL
GLUCOSE SERPL-MCNC: 92 MG/DL
KETONES URINE: NEGATIVE MG/DL
LEUKOCYTE ESTERASE URINE: NEGATIVE
MICROSCOPIC-UA: NORMAL
NITRITE URINE: NEGATIVE
PARATHYROID HORMONE INTACT: 74 PG/ML
PH URINE: 6.5
PHOSPHATE SERPL-MCNC: 4.3 MG/DL
POTASSIUM SERPL-SCNC: 4.6 MMOL/L
PROT UR-MCNC: 5 MG/DL
PROTEIN URINE: NEGATIVE MG/DL
RED BLOOD CELLS URINE: 0 /HPF
SODIUM SERPL-SCNC: 144 MMOL/L
SPECIFIC GRAVITY URINE: 1.01
UROBILINOGEN URINE: 0.2 MG/DL
WHITE BLOOD CELLS URINE: 0 /HPF

## 2025-03-17 ENCOUNTER — APPOINTMENT (OUTPATIENT)
Dept: NEPHROLOGY | Facility: CLINIC | Age: 75
End: 2025-03-17
Payer: MEDICARE

## 2025-03-17 VITALS
DIASTOLIC BLOOD PRESSURE: 91 MMHG | HEIGHT: 70 IN | SYSTOLIC BLOOD PRESSURE: 171 MMHG | BODY MASS INDEX: 34.36 KG/M2 | WEIGHT: 240 LBS | TEMPERATURE: 97.7 F | OXYGEN SATURATION: 87 % | HEART RATE: 69 BPM

## 2025-03-17 VITALS — SYSTOLIC BLOOD PRESSURE: 168 MMHG | DIASTOLIC BLOOD PRESSURE: 84 MMHG

## 2025-03-17 DIAGNOSIS — R60.0 LOCALIZED EDEMA: ICD-10-CM

## 2025-03-17 DIAGNOSIS — N18.32 CHRONIC KIDNEY DISEASE, STAGE 3B: ICD-10-CM

## 2025-03-17 DIAGNOSIS — I10 ESSENTIAL (PRIMARY) HYPERTENSION: ICD-10-CM

## 2025-03-17 PROCEDURE — 99215 OFFICE O/P EST HI 40 MIN: CPT

## 2025-03-17 PROCEDURE — G2211 COMPLEX E/M VISIT ADD ON: CPT

## 2025-06-12 ENCOUNTER — APPOINTMENT (OUTPATIENT)
Dept: ELECTROPHYSIOLOGY | Facility: CLINIC | Age: 75
End: 2025-06-12
Payer: MEDICARE

## 2025-06-12 VITALS
SYSTOLIC BLOOD PRESSURE: 152 MMHG | HEART RATE: 71 BPM | DIASTOLIC BLOOD PRESSURE: 78 MMHG | WEIGHT: 240 LBS | OXYGEN SATURATION: 97 % | BODY MASS INDEX: 34.36 KG/M2 | HEIGHT: 70 IN

## 2025-06-12 PROCEDURE — 93000 ELECTROCARDIOGRAM COMPLETE: CPT | Mod: 59

## 2025-06-12 PROCEDURE — 99214 OFFICE O/P EST MOD 30 MIN: CPT

## 2025-06-12 RX ORDER — PSYLLIUM HUSK 0.4 G
CAPSULE ORAL
Refills: 0 | Status: ACTIVE | COMMUNITY

## 2025-06-12 RX ORDER — ELECTROLYTES/DEXTROSE
SOLUTION, ORAL ORAL DAILY
Refills: 0 | Status: ACTIVE | COMMUNITY

## 2025-06-27 ENCOUNTER — APPOINTMENT (OUTPATIENT)
Dept: NEPHROLOGY | Facility: CLINIC | Age: 75
End: 2025-06-27
Payer: MEDICARE

## 2025-06-27 VITALS
HEIGHT: 70 IN | SYSTOLIC BLOOD PRESSURE: 170 MMHG | TEMPERATURE: 97.4 F | WEIGHT: 233 LBS | BODY MASS INDEX: 33.36 KG/M2 | DIASTOLIC BLOOD PRESSURE: 99 MMHG | HEART RATE: 67 BPM | OXYGEN SATURATION: 94 %

## 2025-06-27 VITALS — DIASTOLIC BLOOD PRESSURE: 72 MMHG | SYSTOLIC BLOOD PRESSURE: 126 MMHG

## 2025-06-27 PROCEDURE — G2211 COMPLEX E/M VISIT ADD ON: CPT

## 2025-06-27 PROCEDURE — 99215 OFFICE O/P EST HI 40 MIN: CPT

## 2025-09-02 ENCOUNTER — APPOINTMENT (OUTPATIENT)
Dept: CARDIOLOGY | Facility: CLINIC | Age: 75
End: 2025-09-02
Payer: MEDICARE

## 2025-09-02 PROCEDURE — 93306 TTE W/DOPPLER COMPLETE: CPT
